# Patient Record
Sex: MALE | Race: WHITE | NOT HISPANIC OR LATINO | URBAN - METROPOLITAN AREA
[De-identification: names, ages, dates, MRNs, and addresses within clinical notes are randomized per-mention and may not be internally consistent; named-entity substitution may affect disease eponyms.]

---

## 2017-01-06 ENCOUNTER — COMMUNICATION - HEALTHEAST (OUTPATIENT)
Dept: FAMILY MEDICINE | Facility: CLINIC | Age: 67
End: 2017-01-06

## 2017-01-17 ENCOUNTER — COMMUNICATION - HEALTHEAST (OUTPATIENT)
Dept: FAMILY MEDICINE | Facility: CLINIC | Age: 67
End: 2017-01-17

## 2017-01-31 ENCOUNTER — COMMUNICATION - HEALTHEAST (OUTPATIENT)
Dept: FAMILY MEDICINE | Facility: CLINIC | Age: 67
End: 2017-01-31

## 2017-01-31 DIAGNOSIS — I10 UNSPECIFIED ESSENTIAL HYPERTENSION: ICD-10-CM

## 2017-01-31 DIAGNOSIS — I10 HTN (HYPERTENSION): ICD-10-CM

## 2017-01-31 DIAGNOSIS — E11.9 TYPE 2 DIABETES MELLITUS (H): ICD-10-CM

## 2017-02-07 ENCOUNTER — COMMUNICATION - HEALTHEAST (OUTPATIENT)
Dept: FAMILY MEDICINE | Facility: CLINIC | Age: 67
End: 2017-02-07

## 2017-02-07 DIAGNOSIS — I10 UNSPECIFIED ESSENTIAL HYPERTENSION: ICD-10-CM

## 2017-02-09 ENCOUNTER — COMMUNICATION - HEALTHEAST (OUTPATIENT)
Dept: FAMILY MEDICINE | Facility: CLINIC | Age: 67
End: 2017-02-09

## 2017-03-13 ENCOUNTER — COMMUNICATION - HEALTHEAST (OUTPATIENT)
Dept: FAMILY MEDICINE | Facility: CLINIC | Age: 67
End: 2017-03-13

## 2017-03-17 ENCOUNTER — RECORDS - HEALTHEAST (OUTPATIENT)
Dept: ADMINISTRATIVE | Facility: OTHER | Age: 67
End: 2017-03-17

## 2017-03-26 ENCOUNTER — COMMUNICATION - HEALTHEAST (OUTPATIENT)
Dept: FAMILY MEDICINE | Facility: CLINIC | Age: 67
End: 2017-03-26

## 2017-03-26 DIAGNOSIS — E11.9 DM (DIABETES MELLITUS) (H): ICD-10-CM

## 2017-03-27 ENCOUNTER — COMMUNICATION - HEALTHEAST (OUTPATIENT)
Dept: FAMILY MEDICINE | Facility: CLINIC | Age: 67
End: 2017-03-27

## 2017-03-27 DIAGNOSIS — E11.9 DIABETES (H): ICD-10-CM

## 2017-05-30 ENCOUNTER — RECORDS - HEALTHEAST (OUTPATIENT)
Dept: ADMINISTRATIVE | Facility: OTHER | Age: 67
End: 2017-05-30

## 2017-06-13 ENCOUNTER — AMBULATORY - HEALTHEAST (OUTPATIENT)
Dept: FAMILY MEDICINE | Facility: CLINIC | Age: 67
End: 2017-06-13

## 2017-06-13 ENCOUNTER — OFFICE VISIT - HEALTHEAST (OUTPATIENT)
Dept: FAMILY MEDICINE | Facility: CLINIC | Age: 67
End: 2017-06-13

## 2017-06-13 DIAGNOSIS — E55.9 VITAMIN D DEFICIENCY: ICD-10-CM

## 2017-06-13 DIAGNOSIS — M19.90 ARTHRITIS: ICD-10-CM

## 2017-06-13 DIAGNOSIS — I10 ESSENTIAL HYPERTENSION WITH GOAL BLOOD PRESSURE LESS THAN 140/90: ICD-10-CM

## 2017-06-13 DIAGNOSIS — N18.30 CHRONIC KIDNEY DISEASE, STAGE III (MODERATE) (H): ICD-10-CM

## 2017-06-13 DIAGNOSIS — E78.5 HYPERLIPIDEMIA, UNSPECIFIED HYPERLIPIDEMIA TYPE: ICD-10-CM

## 2017-06-13 DIAGNOSIS — M43.22 CERVICAL SPINE ANKYLOSIS: ICD-10-CM

## 2017-06-13 DIAGNOSIS — M06.9 RA (RHEUMATOID ARTHRITIS) (H): ICD-10-CM

## 2017-06-13 DIAGNOSIS — R68.89 TEMPERATURE INTOLERANCE: ICD-10-CM

## 2017-06-13 DIAGNOSIS — E11.9 TYPE 2 DIABETES MELLITUS (H): ICD-10-CM

## 2017-06-13 DIAGNOSIS — D64.9 ANEMIA, UNSPECIFIED: ICD-10-CM

## 2017-06-13 LAB — HBA1C MFR BLD: 6.8 % (ref 3.5–6)

## 2017-06-18 ENCOUNTER — COMMUNICATION - HEALTHEAST (OUTPATIENT)
Dept: FAMILY MEDICINE | Facility: CLINIC | Age: 67
End: 2017-06-18

## 2017-08-04 ENCOUNTER — COMMUNICATION - HEALTHEAST (OUTPATIENT)
Dept: FAMILY MEDICINE | Facility: CLINIC | Age: 67
End: 2017-08-04

## 2017-08-04 DIAGNOSIS — E11.9 TYPE 2 DIABETES MELLITUS (H): ICD-10-CM

## 2017-08-08 ENCOUNTER — RECORDS - HEALTHEAST (OUTPATIENT)
Dept: GENERAL RADIOLOGY | Age: 67
End: 2017-08-08

## 2017-08-08 ENCOUNTER — OFFICE VISIT - HEALTHEAST (OUTPATIENT)
Dept: RHEUMATOLOGY | Facility: CLINIC | Age: 67
End: 2017-08-08

## 2017-08-08 ENCOUNTER — COMMUNICATION - HEALTHEAST (OUTPATIENT)
Dept: TELEHEALTH | Facility: CLINIC | Age: 67
End: 2017-08-08

## 2017-08-08 DIAGNOSIS — N18.30 CHRONIC KIDNEY DISEASE, STAGE III (MODERATE) (H): ICD-10-CM

## 2017-08-08 DIAGNOSIS — M25.50 PAIN IN UNSPECIFIED JOINT: ICD-10-CM

## 2017-08-08 DIAGNOSIS — M54.50 LOW BACK PAIN: ICD-10-CM

## 2017-08-08 DIAGNOSIS — M25.50 POLYARTHRALGIA: ICD-10-CM

## 2017-08-08 DIAGNOSIS — M54.50 LOW BACK PAIN WITHOUT SCIATICA: ICD-10-CM

## 2017-08-08 DIAGNOSIS — M54.2 CERVICALGIA: ICD-10-CM

## 2017-08-08 LAB
ALT SERPL W P-5'-P-CCNC: 21 U/L (ref 0–45)
CREAT SERPL-MCNC: 2.13 MG/DL (ref 0.7–1.3)
GFR SERPL CREATININE-BSD FRML MDRD: 31 ML/MIN/1.73M2

## 2017-08-09 LAB
HBV SURFACE AG SERPL QL IA: NEGATIVE
HCV AB SERPL QL IA: NEGATIVE

## 2017-08-10 LAB — ANA SER QL: 0.4 U

## 2017-08-18 ENCOUNTER — RECORDS - HEALTHEAST (OUTPATIENT)
Dept: ADMINISTRATIVE | Facility: OTHER | Age: 67
End: 2017-08-18

## 2017-08-18 LAB
ALT SERPL W/O P-5'-P-CCNC: 24 U/L (ref 12–78)
AST SERPL-CCNC: 16 U/L (ref 15–37)
CREAT SERPL-MCNC: 1.98 MG/DL (ref 0.7–1.3)

## 2017-10-13 ENCOUNTER — COMMUNICATION - HEALTHEAST (OUTPATIENT)
Dept: FAMILY MEDICINE | Facility: CLINIC | Age: 67
End: 2017-10-13

## 2017-10-13 DIAGNOSIS — E78.5 HYPERLIPEMIA: ICD-10-CM

## 2017-10-13 DIAGNOSIS — I10 HTN (HYPERTENSION): ICD-10-CM

## 2017-10-30 ENCOUNTER — COMMUNICATION - HEALTHEAST (OUTPATIENT)
Dept: FAMILY MEDICINE | Facility: CLINIC | Age: 67
End: 2017-10-30

## 2017-11-01 ENCOUNTER — COMMUNICATION - HEALTHEAST (OUTPATIENT)
Dept: FAMILY MEDICINE | Facility: CLINIC | Age: 67
End: 2017-11-01

## 2017-11-01 DIAGNOSIS — I10 HTN (HYPERTENSION): ICD-10-CM

## 2017-11-25 ENCOUNTER — COMMUNICATION - HEALTHEAST (OUTPATIENT)
Dept: FAMILY MEDICINE | Facility: CLINIC | Age: 67
End: 2017-11-25

## 2017-11-28 ENCOUNTER — OFFICE VISIT - HEALTHEAST (OUTPATIENT)
Dept: FAMILY MEDICINE | Facility: CLINIC | Age: 67
End: 2017-11-28

## 2017-11-28 DIAGNOSIS — N18.30 CHRONIC KIDNEY DISEASE, STAGE III (MODERATE) (H): ICD-10-CM

## 2017-11-28 DIAGNOSIS — I10 ESSENTIAL HYPERTENSION: ICD-10-CM

## 2017-11-28 DIAGNOSIS — Z12.5 PROSTATE CANCER SCREENING: ICD-10-CM

## 2017-11-28 DIAGNOSIS — R79.89 LOW VITAMIN B12 LEVEL: ICD-10-CM

## 2017-11-28 DIAGNOSIS — D64.9 ANEMIA: ICD-10-CM

## 2017-11-28 DIAGNOSIS — R79.89 LOW VITAMIN D LEVEL: ICD-10-CM

## 2017-11-28 DIAGNOSIS — E11.9 TYPE 2 DIABETES MELLITUS (H): ICD-10-CM

## 2017-11-28 DIAGNOSIS — E78.5 HYPERLIPIDEMIA, UNSPECIFIED HYPERLIPIDEMIA TYPE: ICD-10-CM

## 2017-11-28 DIAGNOSIS — Z00.01 ENCOUNTER FOR GENERAL ADULT MEDICAL EXAMINATION WITH ABNORMAL FINDINGS: ICD-10-CM

## 2017-11-28 LAB
CHOLEST SERPL-MCNC: 122 MG/DL
FASTING STATUS PATIENT QL REPORTED: ABNORMAL
HBA1C MFR BLD: 6.7 % (ref 3.5–6)
HDLC SERPL-MCNC: 32 MG/DL
LDLC SERPL CALC-MCNC: 58 MG/DL
PSA SERPL-MCNC: 2.9 NG/ML (ref 0–4.5)
TRIGL SERPL-MCNC: 160 MG/DL

## 2017-11-28 ASSESSMENT — MIFFLIN-ST. JEOR: SCORE: 1941.94

## 2017-12-03 ENCOUNTER — COMMUNICATION - HEALTHEAST (OUTPATIENT)
Dept: FAMILY MEDICINE | Facility: CLINIC | Age: 67
End: 2017-12-03

## 2017-12-05 ENCOUNTER — COMMUNICATION - HEALTHEAST (OUTPATIENT)
Dept: FAMILY MEDICINE | Facility: CLINIC | Age: 67
End: 2017-12-05

## 2017-12-21 ENCOUNTER — COMMUNICATION - HEALTHEAST (OUTPATIENT)
Dept: FAMILY MEDICINE | Facility: CLINIC | Age: 67
End: 2017-12-21

## 2017-12-28 ENCOUNTER — AMBULATORY - HEALTHEAST (OUTPATIENT)
Dept: NURSING | Facility: CLINIC | Age: 67
End: 2017-12-28

## 2017-12-28 ENCOUNTER — AMBULATORY - HEALTHEAST (OUTPATIENT)
Dept: FAMILY MEDICINE | Facility: CLINIC | Age: 67
End: 2017-12-28

## 2018-01-16 ENCOUNTER — RECORDS - HEALTHEAST (OUTPATIENT)
Dept: ADMINISTRATIVE | Facility: OTHER | Age: 68
End: 2018-01-16

## 2018-01-27 ENCOUNTER — COMMUNICATION - HEALTHEAST (OUTPATIENT)
Dept: FAMILY MEDICINE | Facility: CLINIC | Age: 68
End: 2018-01-27

## 2018-01-27 DIAGNOSIS — I10 HTN (HYPERTENSION): ICD-10-CM

## 2018-03-26 ENCOUNTER — COMMUNICATION - HEALTHEAST (OUTPATIENT)
Dept: FAMILY MEDICINE | Facility: CLINIC | Age: 68
End: 2018-03-26

## 2018-03-26 DIAGNOSIS — E11.9 DIABETES (H): ICD-10-CM

## 2018-04-17 ENCOUNTER — OFFICE VISIT - HEALTHEAST (OUTPATIENT)
Dept: FAMILY MEDICINE | Facility: CLINIC | Age: 68
End: 2018-04-17

## 2018-04-17 DIAGNOSIS — R07.9 CHEST PAIN: ICD-10-CM

## 2018-04-17 DIAGNOSIS — N18.30 CHRONIC KIDNEY DISEASE, STAGE III (MODERATE) (H): ICD-10-CM

## 2018-04-17 DIAGNOSIS — R97.20 ELEVATED PSA: ICD-10-CM

## 2018-04-17 DIAGNOSIS — R68.89 TEMPERATURE INTOLERANCE: ICD-10-CM

## 2018-04-17 DIAGNOSIS — R41.3 MEMORY LOSS, SHORT TERM: ICD-10-CM

## 2018-04-17 DIAGNOSIS — G47.33 OSA (OBSTRUCTIVE SLEEP APNEA): ICD-10-CM

## 2018-04-17 DIAGNOSIS — E55.9 VITAMIN D DEFICIENCY: ICD-10-CM

## 2018-04-17 DIAGNOSIS — E11.9 TYPE 2 DIABETES MELLITUS (H): ICD-10-CM

## 2018-04-17 LAB
ALBUMIN SERPL-MCNC: 3.7 G/DL (ref 3.5–5)
ALP SERPL-CCNC: 108 U/L (ref 45–120)
ALT SERPL W P-5'-P-CCNC: 27 U/L (ref 0–45)
ANION GAP SERPL CALCULATED.3IONS-SCNC: 10 MMOL/L (ref 5–18)
AST SERPL W P-5'-P-CCNC: 20 U/L (ref 0–40)
ATRIAL RATE - MUSE: 72 BPM
BASOPHILS # BLD AUTO: 0 THOU/UL (ref 0–0.2)
BASOPHILS NFR BLD AUTO: 0 % (ref 0–2)
BILIRUB SERPL-MCNC: 0.5 MG/DL (ref 0–1)
BUN SERPL-MCNC: 39 MG/DL (ref 8–28)
CALCIUM SERPL-MCNC: 9.9 MG/DL (ref 8.5–10.5)
CHLORIDE BLD-SCNC: 107 MMOL/L (ref 98–107)
CHOLEST SERPL-MCNC: 140 MG/DL
CO2 SERPL-SCNC: 25 MMOL/L (ref 22–31)
CREAT SERPL-MCNC: 2.34 MG/DL (ref 0.7–1.3)
DIASTOLIC BLOOD PRESSURE - MUSE: NORMAL MMHG
EOSINOPHIL # BLD AUTO: 0.3 THOU/UL (ref 0–0.4)
EOSINOPHIL NFR BLD AUTO: 4 % (ref 0–6)
ERYTHROCYTE [DISTWIDTH] IN BLOOD BY AUTOMATED COUNT: 13.1 % (ref 11–14.5)
FASTING STATUS PATIENT QL REPORTED: ABNORMAL
GFR SERPL CREATININE-BSD FRML MDRD: 28 ML/MIN/1.73M2
GLUCOSE BLD-MCNC: 135 MG/DL (ref 70–125)
HBA1C MFR BLD: 7.3 % (ref 3.5–6)
HCT VFR BLD AUTO: 37.9 % (ref 40–54)
HDLC SERPL-MCNC: 30 MG/DL
HGB BLD-MCNC: 12.9 G/DL (ref 14–18)
INTERPRETATION ECG - MUSE: NORMAL
LDLC SERPL CALC-MCNC: 49 MG/DL
LYMPHOCYTES # BLD AUTO: 1.6 THOU/UL (ref 0.8–4.4)
LYMPHOCYTES NFR BLD AUTO: 23 % (ref 20–40)
MCH RBC QN AUTO: 31.1 PG (ref 27–34)
MCHC RBC AUTO-ENTMCNC: 34 G/DL (ref 32–36)
MCV RBC AUTO: 91 FL (ref 80–100)
MONOCYTES # BLD AUTO: 0.5 THOU/UL (ref 0–0.9)
MONOCYTES NFR BLD AUTO: 7 % (ref 2–10)
NEUTROPHILS # BLD AUTO: 4.6 THOU/UL (ref 2–7.7)
NEUTROPHILS NFR BLD AUTO: 66 % (ref 50–70)
P AXIS - MUSE: 68 DEGREES
PLATELET # BLD AUTO: 205 THOU/UL (ref 140–440)
PMV BLD AUTO: 9 FL (ref 7–10)
POTASSIUM BLD-SCNC: 5 MMOL/L (ref 3.5–5)
PR INTERVAL - MUSE: 286 MS
PROT SERPL-MCNC: 7 G/DL (ref 6–8)
PSA SERPL-MCNC: 2.9 NG/ML (ref 0–6.5)
QRS DURATION - MUSE: 82 MS
QT - MUSE: 374 MS
QTC - MUSE: 409 MS
R AXIS - MUSE: 29 DEGREES
RBC # BLD AUTO: 4.15 MILL/UL (ref 4.4–6.2)
SODIUM SERPL-SCNC: 142 MMOL/L (ref 136–145)
SYSTOLIC BLOOD PRESSURE - MUSE: NORMAL MMHG
T AXIS - MUSE: 25 DEGREES
TRIGL SERPL-MCNC: 307 MG/DL
TSH SERPL DL<=0.005 MIU/L-ACNC: 0.96 UIU/ML (ref 0.3–5)
VENTRICULAR RATE- MUSE: 72 BPM
VIT B12 SERPL-MCNC: 701 PG/ML (ref 213–816)
WBC: 7 THOU/UL (ref 4–11)

## 2018-04-17 RX ORDER — NITROGLYCERIN 0.4 MG/1
0.4 TABLET SUBLINGUAL EVERY 5 MIN PRN
Qty: 25 TABLET | Refills: 3 | Status: SHIPPED | OUTPATIENT
Start: 2018-04-17

## 2018-04-17 ASSESSMENT — MIFFLIN-ST. JEOR: SCORE: 1991.39

## 2018-04-18 LAB
25(OH)D3 SERPL-MCNC: 34.1 NG/ML (ref 30–80)
25(OH)D3 SERPL-MCNC: 34.1 NG/ML (ref 30–80)

## 2018-05-03 ENCOUNTER — HOSPITAL ENCOUNTER (OUTPATIENT)
Dept: NUCLEAR MEDICINE | Facility: CLINIC | Age: 68
Discharge: HOME | End: 2018-05-03
Attending: FAMILY MEDICINE

## 2018-05-03 ENCOUNTER — HOSPITAL ENCOUNTER (OUTPATIENT)
Dept: CARDIOLOGY | Facility: CLINIC | Age: 68
Discharge: HOME | End: 2018-05-03
Attending: FAMILY MEDICINE

## 2018-05-03 DIAGNOSIS — R07.9 CHEST PAIN: ICD-10-CM

## 2018-05-03 DIAGNOSIS — E11.9 TYPE 2 DIABETES MELLITUS (H): ICD-10-CM

## 2018-05-03 LAB
CV STRESS CURRENT BP HE: NORMAL
CV STRESS CURRENT HR HE: 108
CV STRESS CURRENT HR HE: 109
CV STRESS CURRENT HR HE: 114
CV STRESS CURRENT HR HE: 120
CV STRESS CURRENT HR HE: 125
CV STRESS CURRENT HR HE: 126
CV STRESS CURRENT HR HE: 126
CV STRESS CURRENT HR HE: 127
CV STRESS CURRENT HR HE: 127
CV STRESS CURRENT HR HE: 128
CV STRESS CURRENT HR HE: 128
CV STRESS CURRENT HR HE: 129
CV STRESS CURRENT HR HE: 130
CV STRESS CURRENT HR HE: 72
CV STRESS CURRENT HR HE: 73
CV STRESS CURRENT HR HE: 73
CV STRESS CURRENT HR HE: 74
CV STRESS CURRENT HR HE: 75
CV STRESS CURRENT HR HE: 76
CV STRESS CURRENT HR HE: 76
CV STRESS CURRENT HR HE: 77
CV STRESS CURRENT HR HE: 78
CV STRESS CURRENT HR HE: 80
CV STRESS CURRENT HR HE: 81
CV STRESS CURRENT HR HE: 82
CV STRESS CURRENT HR HE: 83
CV STRESS CURRENT HR HE: 86
CV STRESS CURRENT HR HE: 86
CV STRESS CURRENT HR HE: 87
CV STRESS CURRENT HR HE: 88
CV STRESS CURRENT HR HE: 89
CV STRESS CURRENT HR HE: 97
CV STRESS CURRENT HR HE: 99
CV STRESS DEVIATION TIME HE: NORMAL
CV STRESS ECHO PERCENT HR HE: NORMAL
CV STRESS EXERCISE STAGE HE: NORMAL
CV STRESS FINAL RESTING BP HE: NORMAL
CV STRESS FINAL RESTING HR HE: 75
CV STRESS MAX HR HE: 130
CV STRESS MAX TREADMILL GRADE HE: 0
CV STRESS MAX TREADMILL SPEED HE: 0
CV STRESS PEAK DIA BP HE: NORMAL
CV STRESS PEAK SYS BP HE: NORMAL
CV STRESS PHASE HE: NORMAL
CV STRESS PROTOCOL HE: NORMAL
CV STRESS RESTING PT POSITION HE: NORMAL
CV STRESS RESTING PT POSITION HE: NORMAL
CV STRESS ST DEVIATION AMOUNT HE: NORMAL
CV STRESS ST DEVIATION ELEVATION HE: NORMAL
CV STRESS ST EVELATION AMOUNT HE: NORMAL
CV STRESS TEST TYPE HE: NORMAL
CV STRESS TOTAL STAGE TIME MIN 1 HE: NORMAL
NUC STRESS EJECTION FRACTION: 56 %
STRESS ECHO BASELINE BP: NORMAL
STRESS ECHO BASELINE HR: 89
STRESS ECHO CALCULATED PERCENT HR: 87 %
STRESS ECHO LAST STRESS BP: NORMAL
STRESS ECHO LAST STRESS HR: 128
STRESS ECHO POST ESTIMATED WORKLOAD: 5.2
STRESS ECHO POST EXERCISE DUR MIN: 3
STRESS ECHO POST EXERCISE DUR SEC: 56
STRESS ECHO TARGET HR: 128

## 2018-05-04 ENCOUNTER — AMBULATORY - HEALTHEAST (OUTPATIENT)
Dept: FAMILY MEDICINE | Facility: CLINIC | Age: 68
End: 2018-05-04

## 2018-05-04 ENCOUNTER — COMMUNICATION - HEALTHEAST (OUTPATIENT)
Dept: FAMILY MEDICINE | Facility: CLINIC | Age: 68
End: 2018-05-04

## 2018-05-04 DIAGNOSIS — R94.39 ABNORMAL STRESS TEST: ICD-10-CM

## 2018-05-04 DIAGNOSIS — I25.10 CORONARY ATHEROSCLEROSIS: ICD-10-CM

## 2018-05-08 ENCOUNTER — OFFICE VISIT - HEALTHEAST (OUTPATIENT)
Dept: CARDIOLOGY | Facility: CLINIC | Age: 68
End: 2018-05-08

## 2018-05-08 DIAGNOSIS — G47.33 OBSTRUCTIVE SLEEP APNEA: ICD-10-CM

## 2018-05-08 DIAGNOSIS — I10 ESSENTIAL HYPERTENSION: ICD-10-CM

## 2018-05-08 DIAGNOSIS — E78.2 MIXED HYPERLIPIDEMIA: ICD-10-CM

## 2018-05-08 DIAGNOSIS — R94.39 ABNORMAL NUCLEAR STRESS TEST: ICD-10-CM

## 2018-05-08 DIAGNOSIS — I20.0 UNSTABLE ANGINA (H): ICD-10-CM

## 2018-05-08 ASSESSMENT — MIFFLIN-ST. JEOR
SCORE: 1956.01
SCORE: 1978.23

## 2018-05-09 ENCOUNTER — SURGERY - HEALTHEAST (OUTPATIENT)
Dept: CARDIOLOGY | Facility: CLINIC | Age: 68
End: 2018-05-09

## 2018-05-10 ASSESSMENT — MIFFLIN-ST. JEOR: SCORE: 1962.36

## 2018-05-12 ENCOUNTER — COMMUNICATION - HEALTHEAST (OUTPATIENT)
Dept: FAMILY MEDICINE | Facility: CLINIC | Age: 68
End: 2018-05-12

## 2018-05-12 DIAGNOSIS — E11.9 DIABETES (H): ICD-10-CM

## 2018-05-14 ENCOUNTER — SURGERY - HEALTHEAST (OUTPATIENT)
Dept: CARDIOLOGY | Facility: CLINIC | Age: 68
End: 2018-05-14

## 2018-05-14 DIAGNOSIS — I25.10 CORONARY ARTERY DISEASE: ICD-10-CM

## 2018-05-16 ENCOUNTER — ANESTHESIA - HEALTHEAST (OUTPATIENT)
Dept: SURGERY | Facility: CLINIC | Age: 68
End: 2018-05-16

## 2018-05-16 ENCOUNTER — HOSPITAL ENCOUNTER (OUTPATIENT)
Dept: SURGERY | Facility: CLINIC | Age: 68
Discharge: HOME | End: 2018-05-16

## 2018-05-16 ENCOUNTER — HOSPITAL ENCOUNTER (OUTPATIENT)
Dept: RADIOLOGY | Facility: CLINIC | Age: 68
Discharge: HOME | End: 2018-05-16

## 2018-05-16 DIAGNOSIS — I25.10 CORONARY ARTERY DISEASE: ICD-10-CM

## 2018-05-16 LAB
ABO/RH(D): NORMAL
ALBUMIN UR-MCNC: ABNORMAL MG/DL
ANION GAP SERPL CALCULATED.3IONS-SCNC: 9 MMOL/L (ref 5–18)
ANTIBODY SCREEN: NEGATIVE
APPEARANCE UR: CLEAR
BACTERIA #/AREA URNS HPF: ABNORMAL HPF
BILIRUB UR QL STRIP: NEGATIVE
BUN SERPL-MCNC: 44 MG/DL (ref 8–28)
CALCIUM SERPL-MCNC: 10.3 MG/DL (ref 8.5–10.5)
CHLORIDE BLD-SCNC: 108 MMOL/L (ref 98–107)
CO2 SERPL-SCNC: 23 MMOL/L (ref 22–31)
COLOR UR AUTO: YELLOW
CREAT SERPL-MCNC: 2.56 MG/DL (ref 0.7–1.3)
ERYTHROCYTE [DISTWIDTH] IN BLOOD BY AUTOMATED COUNT: 12.7 % (ref 11–14.5)
GFR SERPL CREATININE-BSD FRML MDRD: 25 ML/MIN/1.73M2
GLUCOSE BLD-MCNC: 252 MG/DL (ref 70–125)
GLUCOSE UR STRIP-MCNC: NEGATIVE MG/DL
HCT VFR BLD AUTO: 35.7 % (ref 40–54)
HGB BLD-MCNC: 12 G/DL (ref 14–18)
HGB UR QL STRIP: NEGATIVE
INR PPP: 1.05 (ref 0.9–1.1)
KETONES UR STRIP-MCNC: NEGATIVE MG/DL
LEUKOCYTE ESTERASE UR QL STRIP: NEGATIVE
MAGNESIUM SERPL-MCNC: 1.4 MG/DL (ref 1.8–2.6)
MCH RBC QN AUTO: 30.6 PG (ref 27–34)
MCHC RBC AUTO-ENTMCNC: 33.6 G/DL (ref 32–36)
MCV RBC AUTO: 91 FL (ref 80–100)
MUCOUS THREADS #/AREA URNS LPF: ABNORMAL LPF
NITRATE UR QL: NEGATIVE
PH UR STRIP: 5.5 [PH] (ref 4.5–8)
PLATELET # BLD AUTO: 206 THOU/UL (ref 140–440)
PMV BLD AUTO: 11 FL (ref 8.5–12.5)
POTASSIUM BLD-SCNC: 5.4 MMOL/L (ref 3.5–5)
PREALB SERPL-MCNC: 26 MG/DL (ref 19–38)
RBC # BLD AUTO: 3.92 MILL/UL (ref 4.4–6.2)
RBC #/AREA URNS AUTO: ABNORMAL HPF
SODIUM SERPL-SCNC: 140 MMOL/L (ref 136–145)
SP GR UR STRIP: 1.01 (ref 1–1.03)
SQUAMOUS #/AREA URNS AUTO: ABNORMAL LPF
UROBILINOGEN UR STRIP-ACNC: ABNORMAL
WBC #/AREA URNS AUTO: ABNORMAL HPF
WBC: 6.5 THOU/UL (ref 4–11)

## 2018-05-16 ASSESSMENT — MIFFLIN-ST. JEOR: SCORE: 1965.99

## 2018-05-17 ENCOUNTER — SURGERY - HEALTHEAST (OUTPATIENT)
Dept: SURGERY | Facility: CLINIC | Age: 68
End: 2018-05-17

## 2018-05-17 LAB — BACTERIA SPEC CULT: NORMAL

## 2018-05-17 ASSESSMENT — MIFFLIN-ST. JEOR: SCORE: 1947.39

## 2018-05-20 ASSESSMENT — MIFFLIN-ST. JEOR: SCORE: 1991.84

## 2018-05-21 ASSESSMENT — MIFFLIN-ST. JEOR: SCORE: 2001.37

## 2018-05-22 ASSESSMENT — MIFFLIN-ST. JEOR: SCORE: 1966.89

## 2018-05-23 ASSESSMENT — MIFFLIN-ST. JEOR: SCORE: 1931.51

## 2018-05-24 ASSESSMENT — MIFFLIN-ST. JEOR: SCORE: 1918.36

## 2018-05-29 ENCOUNTER — OFFICE VISIT - HEALTHEAST (OUTPATIENT)
Dept: FAMILY MEDICINE | Facility: CLINIC | Age: 68
End: 2018-05-29

## 2018-05-29 DIAGNOSIS — Z79.4 TYPE 2 DIABETES MELLITUS WITH STAGE 3 CHRONIC KIDNEY DISEASE, WITH LONG-TERM CURRENT USE OF INSULIN (H): ICD-10-CM

## 2018-05-29 DIAGNOSIS — Z95.1 S/P CABG (CORONARY ARTERY BYPASS GRAFT): ICD-10-CM

## 2018-05-29 DIAGNOSIS — D64.9 ANEMIA: ICD-10-CM

## 2018-05-29 DIAGNOSIS — E11.22 TYPE 2 DIABETES MELLITUS WITH STAGE 3 CHRONIC KIDNEY DISEASE, WITH LONG-TERM CURRENT USE OF INSULIN (H): ICD-10-CM

## 2018-05-29 DIAGNOSIS — N18.30 CHRONIC KIDNEY DISEASE, STAGE III (MODERATE) (H): ICD-10-CM

## 2018-05-29 DIAGNOSIS — I10 ESSENTIAL HYPERTENSION: ICD-10-CM

## 2018-05-29 DIAGNOSIS — R30.0 DYSURIA: ICD-10-CM

## 2018-05-29 DIAGNOSIS — N18.30 TYPE 2 DIABETES MELLITUS WITH STAGE 3 CHRONIC KIDNEY DISEASE, WITH LONG-TERM CURRENT USE OF INSULIN (H): ICD-10-CM

## 2018-05-29 LAB
ALBUMIN UR-MCNC: ABNORMAL MG/DL
ANION GAP SERPL CALCULATED.3IONS-SCNC: 17 MMOL/L (ref 5–18)
APPEARANCE UR: CLEAR
BACTERIA #/AREA URNS HPF: ABNORMAL HPF
BILIRUB UR QL STRIP: NEGATIVE
BUN SERPL-MCNC: 82 MG/DL (ref 8–28)
CALCIUM SERPL-MCNC: 10.2 MG/DL (ref 8.5–10.5)
CHLORIDE BLD-SCNC: 101 MMOL/L (ref 98–107)
CO2 SERPL-SCNC: 20 MMOL/L (ref 22–31)
COLOR UR AUTO: YELLOW
CREAT SERPL-MCNC: 4.08 MG/DL (ref 0.7–1.3)
ERYTHROCYTE [DISTWIDTH] IN BLOOD BY AUTOMATED COUNT: 12.7 % (ref 11–14.5)
GFR SERPL CREATININE-BSD FRML MDRD: 15 ML/MIN/1.73M2
GLUCOSE BLD-MCNC: 172 MG/DL (ref 70–125)
GLUCOSE UR STRIP-MCNC: NEGATIVE MG/DL
HCT VFR BLD AUTO: 29 % (ref 40–54)
HGB BLD-MCNC: 9.7 G/DL (ref 14–18)
HGB UR QL STRIP: NEGATIVE
KETONES UR STRIP-MCNC: NEGATIVE MG/DL
LEUKOCYTE ESTERASE UR QL STRIP: ABNORMAL
MCH RBC QN AUTO: 30.6 PG (ref 27–34)
MCHC RBC AUTO-ENTMCNC: 33.5 G/DL (ref 32–36)
MCV RBC AUTO: 91 FL (ref 80–100)
MUCOUS THREADS #/AREA URNS LPF: ABNORMAL LPF
NITRATE UR QL: NEGATIVE
PH UR STRIP: 5.5 [PH] (ref 5–8)
PLATELET # BLD AUTO: 429 THOU/UL (ref 140–440)
PMV BLD AUTO: 7.6 FL (ref 7–10)
POTASSIUM BLD-SCNC: 4.5 MMOL/L (ref 3.5–5)
RBC # BLD AUTO: 3.18 MILL/UL (ref 4.4–6.2)
RBC #/AREA URNS AUTO: ABNORMAL HPF
SODIUM SERPL-SCNC: 138 MMOL/L (ref 136–145)
SP GR UR STRIP: 1.01 (ref 1–1.03)
SQUAMOUS #/AREA URNS AUTO: ABNORMAL LPF
UROBILINOGEN UR STRIP-ACNC: ABNORMAL
WBC #/AREA URNS AUTO: ABNORMAL HPF
WBC: 9.1 THOU/UL (ref 4–11)

## 2018-05-31 ENCOUNTER — AMBULATORY - HEALTHEAST (OUTPATIENT)
Dept: LAB | Facility: CLINIC | Age: 68
End: 2018-05-31

## 2018-05-31 DIAGNOSIS — E11.21 TYPE 2 DIABETES MELLITUS WITH DIABETIC NEPHROPATHY (H): ICD-10-CM

## 2018-05-31 DIAGNOSIS — E87.5 HYPERKALEMIA: ICD-10-CM

## 2018-05-31 DIAGNOSIS — I10 ESSENTIAL (PRIMARY) HYPERTENSION: ICD-10-CM

## 2018-05-31 DIAGNOSIS — N17.9 ACUTE KIDNEY FAILURE (H): ICD-10-CM

## 2018-05-31 DIAGNOSIS — N18.30 CHRONIC KIDNEY DISEASE (CKD), STAGE III (MODERATE) (H): ICD-10-CM

## 2018-06-11 ENCOUNTER — OFFICE VISIT - HEALTHEAST (OUTPATIENT)
Dept: FAMILY MEDICINE | Facility: CLINIC | Age: 68
End: 2018-06-11

## 2018-06-11 DIAGNOSIS — E87.5 HYPERKALEMIA: ICD-10-CM

## 2018-06-11 DIAGNOSIS — E11.21 TYPE 2 DIABETES MELLITUS WITH DIABETIC NEPHROPATHY (H): ICD-10-CM

## 2018-06-11 DIAGNOSIS — E11.22 TYPE 2 DIABETES MELLITUS WITH STAGE 3 CHRONIC KIDNEY DISEASE, WITH LONG-TERM CURRENT USE OF INSULIN (H): ICD-10-CM

## 2018-06-11 DIAGNOSIS — Z79.4 TYPE 2 DIABETES MELLITUS WITH STAGE 3 CHRONIC KIDNEY DISEASE, WITH LONG-TERM CURRENT USE OF INSULIN (H): ICD-10-CM

## 2018-06-11 DIAGNOSIS — N17.9 ACUTE KIDNEY FAILURE (H): ICD-10-CM

## 2018-06-11 DIAGNOSIS — N17.9 AKI (ACUTE KIDNEY INJURY) (H): ICD-10-CM

## 2018-06-11 DIAGNOSIS — Z95.1 S/P CABG (CORONARY ARTERY BYPASS GRAFT): ICD-10-CM

## 2018-06-11 DIAGNOSIS — N18.30 CHRONIC KIDNEY DISEASE (CKD), STAGE III (MODERATE) (H): ICD-10-CM

## 2018-06-11 DIAGNOSIS — N18.30 CHRONIC KIDNEY DISEASE, STAGE III (MODERATE) (H): ICD-10-CM

## 2018-06-11 DIAGNOSIS — N18.30 TYPE 2 DIABETES MELLITUS WITH STAGE 3 CHRONIC KIDNEY DISEASE, WITH LONG-TERM CURRENT USE OF INSULIN (H): ICD-10-CM

## 2018-06-11 DIAGNOSIS — I95.9 HYPOTENSION: ICD-10-CM

## 2018-06-11 DIAGNOSIS — I10 ESSENTIAL (PRIMARY) HYPERTENSION: ICD-10-CM

## 2018-06-11 LAB
ANION GAP SERPL CALCULATED.3IONS-SCNC: 15 MMOL/L (ref 5–18)
BUN SERPL-MCNC: 58 MG/DL (ref 8–28)
CALCIUM SERPL-MCNC: 10.4 MG/DL (ref 8.5–10.5)
CHLORIDE BLD-SCNC: 105 MMOL/L (ref 98–107)
CO2 SERPL-SCNC: 22 MMOL/L (ref 22–31)
CREAT SERPL-MCNC: 3.39 MG/DL (ref 0.7–1.3)
ERYTHROCYTE [DISTWIDTH] IN BLOOD BY AUTOMATED COUNT: 14.2 % (ref 11–14.5)
GFR SERPL CREATININE-BSD FRML MDRD: 18 ML/MIN/1.73M2
GLUCOSE BLD-MCNC: 125 MG/DL (ref 70–125)
HCT VFR BLD AUTO: 26.4 % (ref 40–54)
HGB BLD-MCNC: 8.8 G/DL (ref 14–18)
MCH RBC QN AUTO: 30.4 PG (ref 27–34)
MCHC RBC AUTO-ENTMCNC: 33.3 G/DL (ref 32–36)
MCV RBC AUTO: 91 FL (ref 80–100)
PHOSPHATE SERPL-MCNC: 4.3 MG/DL (ref 2.5–4.5)
PLATELET # BLD AUTO: 275 THOU/UL (ref 140–440)
PMV BLD AUTO: 11 FL (ref 8.5–12.5)
POTASSIUM BLD-SCNC: 4.3 MMOL/L (ref 3.5–5)
PTH-INTACT SERPL-MCNC: 66 PG/ML (ref 10–86)
RBC # BLD AUTO: 2.89 MILL/UL (ref 4.4–6.2)
SODIUM SERPL-SCNC: 142 MMOL/L (ref 136–145)
WBC: 6.2 THOU/UL (ref 4–11)

## 2018-06-12 LAB
25(OH)D3 SERPL-MCNC: 44.1 NG/ML (ref 30–80)
25(OH)D3 SERPL-MCNC: 44.1 NG/ML (ref 30–80)

## 2018-06-15 ENCOUNTER — AMBULATORY - HEALTHEAST (OUTPATIENT)
Dept: CARDIAC REHAB | Facility: CLINIC | Age: 68
End: 2018-06-15

## 2018-06-15 DIAGNOSIS — Z95.1 S/P CABG (CORONARY ARTERY BYPASS GRAFT): ICD-10-CM

## 2018-06-15 LAB
GLUCOSE BLDC GLUCOMTR-MCNC: 121 MG/DL
GLUCOSE BLDC GLUCOMTR-MCNC: 176 MG/DL

## 2018-06-19 ENCOUNTER — AMBULATORY - HEALTHEAST (OUTPATIENT)
Dept: CARDIAC REHAB | Facility: CLINIC | Age: 68
End: 2018-06-19

## 2018-06-19 DIAGNOSIS — Z95.1 S/P CABG (CORONARY ARTERY BYPASS GRAFT): ICD-10-CM

## 2018-06-19 LAB
GLUCOSE BLDC GLUCOMTR-MCNC: 179 MG/DL
GLUCOSE BLDC GLUCOMTR-MCNC: 193 MG/DL

## 2018-06-20 ENCOUNTER — COMMUNICATION - HEALTHEAST (OUTPATIENT)
Dept: SCHEDULING | Facility: CLINIC | Age: 68
End: 2018-06-20

## 2018-06-20 ENCOUNTER — OFFICE VISIT - HEALTHEAST (OUTPATIENT)
Dept: SLEEP MEDICINE | Facility: CLINIC | Age: 68
End: 2018-06-20

## 2018-06-20 ENCOUNTER — OFFICE VISIT - HEALTHEAST (OUTPATIENT)
Dept: CARDIOLOGY | Facility: CLINIC | Age: 68
End: 2018-06-20

## 2018-06-20 DIAGNOSIS — G47.50 PARASOMNIA: ICD-10-CM

## 2018-06-20 DIAGNOSIS — G47.33 OSA (OBSTRUCTIVE SLEEP APNEA): ICD-10-CM

## 2018-06-20 DIAGNOSIS — Z95.1 S/P CABG (CORONARY ARTERY BYPASS GRAFT): ICD-10-CM

## 2018-06-20 ASSESSMENT — MIFFLIN-ST. JEOR
SCORE: 1889.77
SCORE: 1889.77

## 2018-06-21 ENCOUNTER — COMMUNICATION - HEALTHEAST (OUTPATIENT)
Dept: FAMILY MEDICINE | Facility: CLINIC | Age: 68
End: 2018-06-21

## 2018-06-21 ENCOUNTER — AMBULATORY - HEALTHEAST (OUTPATIENT)
Dept: CARDIAC REHAB | Facility: CLINIC | Age: 68
End: 2018-06-21

## 2018-06-21 DIAGNOSIS — E78.5 HYPERLIPIDEMIA, UNSPECIFIED HYPERLIPIDEMIA TYPE: ICD-10-CM

## 2018-06-21 DIAGNOSIS — Z95.1 S/P CABG (CORONARY ARTERY BYPASS GRAFT): ICD-10-CM

## 2018-06-22 ENCOUNTER — AMBULATORY - HEALTHEAST (OUTPATIENT)
Dept: FAMILY MEDICINE | Facility: CLINIC | Age: 68
End: 2018-06-22

## 2018-06-22 ENCOUNTER — OFFICE VISIT - HEALTHEAST (OUTPATIENT)
Dept: FAMILY MEDICINE | Facility: CLINIC | Age: 68
End: 2018-06-22

## 2018-06-22 DIAGNOSIS — E11.22 CKD STAGE 4 DUE TO TYPE 2 DIABETES MELLITUS (H): ICD-10-CM

## 2018-06-22 DIAGNOSIS — N18.2 TYPE 2 DIABETES MELLITUS WITH STAGE 2 CHRONIC KIDNEY DISEASE, WITH LONG-TERM CURRENT USE OF INSULIN (H): ICD-10-CM

## 2018-06-22 DIAGNOSIS — R01.1 HEART MURMUR, SYSTOLIC: ICD-10-CM

## 2018-06-22 DIAGNOSIS — D64.9 ANEMIA: ICD-10-CM

## 2018-06-22 DIAGNOSIS — K59.01 SLOW TRANSIT CONSTIPATION: ICD-10-CM

## 2018-06-22 DIAGNOSIS — Z79.4 TYPE 2 DIABETES MELLITUS WITH STAGE 2 CHRONIC KIDNEY DISEASE, WITH LONG-TERM CURRENT USE OF INSULIN (H): ICD-10-CM

## 2018-06-22 DIAGNOSIS — E11.22 TYPE 2 DIABETES MELLITUS WITH STAGE 2 CHRONIC KIDNEY DISEASE, WITH LONG-TERM CURRENT USE OF INSULIN (H): ICD-10-CM

## 2018-06-22 DIAGNOSIS — N18.4 CKD STAGE 4 DUE TO TYPE 2 DIABETES MELLITUS (H): ICD-10-CM

## 2018-06-22 DIAGNOSIS — I25.110 CORONARY ARTERY DISEASE INVOLVING NATIVE CORONARY ARTERY OF NATIVE HEART WITH UNSTABLE ANGINA PECTORIS (H): ICD-10-CM

## 2018-06-22 DIAGNOSIS — I10 ESSENTIAL HYPERTENSION: ICD-10-CM

## 2018-06-26 ENCOUNTER — AMBULATORY - HEALTHEAST (OUTPATIENT)
Dept: CARDIAC REHAB | Facility: CLINIC | Age: 68
End: 2018-06-26

## 2018-06-26 DIAGNOSIS — Z95.1 S/P CABG (CORONARY ARTERY BYPASS GRAFT): ICD-10-CM

## 2018-06-28 ENCOUNTER — AMBULATORY - HEALTHEAST (OUTPATIENT)
Dept: CARDIAC REHAB | Facility: CLINIC | Age: 68
End: 2018-06-28

## 2018-06-28 DIAGNOSIS — Z95.1 S/P CABG (CORONARY ARTERY BYPASS GRAFT): ICD-10-CM

## 2018-06-28 LAB
GLUCOSE BLDC GLUCOMTR-MCNC: 124 MG/DL
GLUCOSE BLDC GLUCOMTR-MCNC: 87 MG/DL
GLUCOSE BLDC GLUCOMTR-MCNC: 96 MG/DL

## 2018-07-01 ENCOUNTER — COMMUNICATION - HEALTHEAST (OUTPATIENT)
Dept: FAMILY MEDICINE | Facility: CLINIC | Age: 68
End: 2018-07-01

## 2018-07-01 DIAGNOSIS — E11.9 DM (DIABETES MELLITUS) (H): ICD-10-CM

## 2018-07-05 ENCOUNTER — COMMUNICATION - HEALTHEAST (OUTPATIENT)
Dept: FAMILY MEDICINE | Facility: CLINIC | Age: 68
End: 2018-07-05

## 2018-07-05 DIAGNOSIS — E11.9 DIABETES MELLITUS, TYPE 2 (H): ICD-10-CM

## 2018-07-10 ENCOUNTER — AMBULATORY - HEALTHEAST (OUTPATIENT)
Dept: CARDIAC REHAB | Facility: CLINIC | Age: 68
End: 2018-07-10

## 2018-07-10 DIAGNOSIS — Z95.1 S/P CABG (CORONARY ARTERY BYPASS GRAFT): ICD-10-CM

## 2018-07-12 ENCOUNTER — AMBULATORY - HEALTHEAST (OUTPATIENT)
Dept: CARDIAC REHAB | Facility: CLINIC | Age: 68
End: 2018-07-12

## 2018-07-12 DIAGNOSIS — Z95.1 S/P CABG (CORONARY ARTERY BYPASS GRAFT): ICD-10-CM

## 2018-07-17 ENCOUNTER — AMBULATORY - HEALTHEAST (OUTPATIENT)
Dept: CARDIAC REHAB | Facility: CLINIC | Age: 68
End: 2018-07-17

## 2018-07-17 DIAGNOSIS — Z95.1 S/P CABG (CORONARY ARTERY BYPASS GRAFT): ICD-10-CM

## 2018-07-19 ENCOUNTER — OFFICE VISIT - HEALTHEAST (OUTPATIENT)
Dept: CARDIOLOGY | Facility: CLINIC | Age: 68
End: 2018-07-19

## 2018-07-19 DIAGNOSIS — I35.0 MILD AORTIC VALVE STENOSIS: ICD-10-CM

## 2018-07-19 DIAGNOSIS — Z95.1 S/P CABG (CORONARY ARTERY BYPASS GRAFT): ICD-10-CM

## 2018-07-19 DIAGNOSIS — E78.2 MIXED HYPERLIPIDEMIA: ICD-10-CM

## 2018-07-19 DIAGNOSIS — I10 ESSENTIAL HYPERTENSION: ICD-10-CM

## 2018-07-19 DIAGNOSIS — N18.4 CKD STAGE 4 DUE TO TYPE 2 DIABETES MELLITUS (H): ICD-10-CM

## 2018-07-19 DIAGNOSIS — E11.22 CKD STAGE 4 DUE TO TYPE 2 DIABETES MELLITUS (H): ICD-10-CM

## 2018-07-19 ASSESSMENT — MIFFLIN-ST. JEOR: SCORE: 1912.45

## 2018-07-24 ENCOUNTER — COMMUNICATION - HEALTHEAST (OUTPATIENT)
Dept: FAMILY MEDICINE | Facility: CLINIC | Age: 68
End: 2018-07-24

## 2018-07-24 ENCOUNTER — COMMUNICATION - HEALTHEAST (OUTPATIENT)
Dept: SCHEDULING | Facility: CLINIC | Age: 68
End: 2018-07-24

## 2018-07-24 ENCOUNTER — AMBULATORY - HEALTHEAST (OUTPATIENT)
Dept: CARDIAC REHAB | Facility: CLINIC | Age: 68
End: 2018-07-24

## 2018-07-24 DIAGNOSIS — Z95.1 S/P CABG (CORONARY ARTERY BYPASS GRAFT): ICD-10-CM

## 2018-07-24 DIAGNOSIS — E78.5 HYPERLIPIDEMIA, UNSPECIFIED HYPERLIPIDEMIA TYPE: ICD-10-CM

## 2018-07-26 ENCOUNTER — AMBULATORY - HEALTHEAST (OUTPATIENT)
Dept: CARDIAC REHAB | Facility: CLINIC | Age: 68
End: 2018-07-26

## 2018-07-26 DIAGNOSIS — Z95.1 S/P CABG (CORONARY ARTERY BYPASS GRAFT): ICD-10-CM

## 2018-07-31 ENCOUNTER — AMBULATORY - HEALTHEAST (OUTPATIENT)
Dept: CARDIAC REHAB | Facility: CLINIC | Age: 68
End: 2018-07-31

## 2018-07-31 DIAGNOSIS — Z95.1 S/P CABG (CORONARY ARTERY BYPASS GRAFT): ICD-10-CM

## 2018-08-02 ENCOUNTER — AMBULATORY - HEALTHEAST (OUTPATIENT)
Dept: CARDIAC REHAB | Facility: CLINIC | Age: 68
End: 2018-08-02

## 2018-08-02 DIAGNOSIS — Z95.1 S/P CABG (CORONARY ARTERY BYPASS GRAFT): ICD-10-CM

## 2018-08-06 ENCOUNTER — COMMUNICATION - HEALTHEAST (OUTPATIENT)
Dept: FAMILY MEDICINE | Facility: CLINIC | Age: 68
End: 2018-08-06

## 2018-08-06 DIAGNOSIS — I10 HTN (HYPERTENSION): ICD-10-CM

## 2018-08-07 ENCOUNTER — AMBULATORY - HEALTHEAST (OUTPATIENT)
Dept: CARDIAC REHAB | Facility: CLINIC | Age: 68
End: 2018-08-07

## 2018-08-07 DIAGNOSIS — Z95.1 S/P CABG (CORONARY ARTERY BYPASS GRAFT): ICD-10-CM

## 2018-08-07 LAB — GLUCOSE BLDC GLUCOMTR-MCNC: 141 MG/DL

## 2018-08-09 ENCOUNTER — AMBULATORY - HEALTHEAST (OUTPATIENT)
Dept: CARDIAC REHAB | Facility: CLINIC | Age: 68
End: 2018-08-09

## 2018-08-09 DIAGNOSIS — Z95.1 S/P CABG (CORONARY ARTERY BYPASS GRAFT): ICD-10-CM

## 2018-08-14 ENCOUNTER — AMBULATORY - HEALTHEAST (OUTPATIENT)
Dept: CARDIAC REHAB | Facility: CLINIC | Age: 68
End: 2018-08-14

## 2018-08-14 DIAGNOSIS — Z95.1 S/P CABG (CORONARY ARTERY BYPASS GRAFT): ICD-10-CM

## 2018-08-21 ENCOUNTER — COMMUNICATION - HEALTHEAST (OUTPATIENT)
Dept: FAMILY MEDICINE | Facility: CLINIC | Age: 68
End: 2018-08-21

## 2018-08-21 ENCOUNTER — AMBULATORY - HEALTHEAST (OUTPATIENT)
Dept: CARDIAC REHAB | Facility: CLINIC | Age: 68
End: 2018-08-21

## 2018-08-21 DIAGNOSIS — Z95.1 S/P CABG (CORONARY ARTERY BYPASS GRAFT): ICD-10-CM

## 2018-08-23 ENCOUNTER — AMBULATORY - HEALTHEAST (OUTPATIENT)
Dept: CARDIAC REHAB | Facility: CLINIC | Age: 68
End: 2018-08-23

## 2018-08-23 DIAGNOSIS — Z95.1 S/P CABG (CORONARY ARTERY BYPASS GRAFT): ICD-10-CM

## 2018-08-27 ENCOUNTER — COMMUNICATION - HEALTHEAST (OUTPATIENT)
Dept: FAMILY MEDICINE | Facility: CLINIC | Age: 68
End: 2018-08-27

## 2018-08-27 DIAGNOSIS — E11.9 DIABETES (H): ICD-10-CM

## 2018-08-28 ENCOUNTER — AMBULATORY - HEALTHEAST (OUTPATIENT)
Dept: CARDIAC REHAB | Facility: CLINIC | Age: 68
End: 2018-08-28

## 2018-08-28 DIAGNOSIS — Z95.1 S/P CABG (CORONARY ARTERY BYPASS GRAFT): ICD-10-CM

## 2018-08-30 ENCOUNTER — AMBULATORY - HEALTHEAST (OUTPATIENT)
Dept: CARDIAC REHAB | Facility: CLINIC | Age: 68
End: 2018-08-30

## 2018-08-30 ENCOUNTER — AMBULATORY - HEALTHEAST (OUTPATIENT)
Dept: LAB | Facility: CLINIC | Age: 68
End: 2018-08-30

## 2018-08-30 DIAGNOSIS — N18.4 CHRONIC RENAL DISEASE, STAGE IV (H): ICD-10-CM

## 2018-08-30 DIAGNOSIS — E11.21 TYPE II DIABETES MELLITUS WITH NEPHROPATHY (H): ICD-10-CM

## 2018-08-30 DIAGNOSIS — N18.9 ANEMIA IN CHRONIC RENAL DISEASE: ICD-10-CM

## 2018-08-30 DIAGNOSIS — N17.9 KIDNEY FAILURE, ACUTE (H): ICD-10-CM

## 2018-08-30 DIAGNOSIS — D63.1 ANEMIA IN CHRONIC RENAL DISEASE: ICD-10-CM

## 2018-08-30 DIAGNOSIS — I10 HYPERTENSION: ICD-10-CM

## 2018-08-30 DIAGNOSIS — Z95.1 S/P CABG (CORONARY ARTERY BYPASS GRAFT): ICD-10-CM

## 2018-09-06 ENCOUNTER — AMBULATORY - HEALTHEAST (OUTPATIENT)
Dept: CARDIAC REHAB | Facility: CLINIC | Age: 68
End: 2018-09-06

## 2018-09-06 DIAGNOSIS — Z95.1 S/P CABG (CORONARY ARTERY BYPASS GRAFT): ICD-10-CM

## 2018-09-10 ENCOUNTER — AMBULATORY - HEALTHEAST (OUTPATIENT)
Dept: LAB | Facility: CLINIC | Age: 68
End: 2018-09-10

## 2018-09-10 DIAGNOSIS — D63.1 ANEMIA IN CHRONIC RENAL DISEASE: ICD-10-CM

## 2018-09-10 DIAGNOSIS — N18.9 ANEMIA IN CHRONIC RENAL DISEASE: ICD-10-CM

## 2018-09-10 DIAGNOSIS — N18.4 CHRONIC RENAL DISEASE, STAGE IV (H): ICD-10-CM

## 2018-09-10 DIAGNOSIS — E11.21 TYPE II DIABETES MELLITUS WITH NEPHROPATHY (H): ICD-10-CM

## 2018-09-10 DIAGNOSIS — I10 HYPERTENSION: ICD-10-CM

## 2018-09-10 DIAGNOSIS — N17.9 KIDNEY FAILURE, ACUTE (H): ICD-10-CM

## 2018-09-10 LAB
ALBUMIN SERPL-MCNC: 4 G/DL (ref 3.5–5)
ANION GAP SERPL CALCULATED.3IONS-SCNC: 9 MMOL/L (ref 5–18)
BUN SERPL-MCNC: 54 MG/DL (ref 8–28)
CALCIUM SERPL-MCNC: 10.5 MG/DL (ref 8.5–10.5)
CHLORIDE BLD-SCNC: 108 MMOL/L (ref 98–107)
CO2 SERPL-SCNC: 26 MMOL/L (ref 22–31)
CREAT SERPL-MCNC: 3.23 MG/DL (ref 0.7–1.3)
CREAT UR-MCNC: 63.2 MG/DL
FERRITIN SERPL-MCNC: 86 NG/ML (ref 27–300)
GFR SERPL CREATININE-BSD FRML MDRD: 19 ML/MIN/1.73M2
GLUCOSE BLD-MCNC: 66 MG/DL (ref 70–125)
HGB BLD-MCNC: 11.8 G/DL (ref 14–18)
IRON SATN MFR SERPL: 28 % (ref 20–50)
IRON SERPL-MCNC: 99 UG/DL (ref 42–175)
MAGNESIUM SERPL-MCNC: 2.1 MG/DL (ref 1.8–2.6)
PHOSPHATE SERPL-MCNC: 4.2 MG/DL (ref 2.5–4.5)
POTASSIUM BLD-SCNC: 5 MMOL/L (ref 3.5–5)
PROTEIN, RANDOM URINE - HISTORICAL: 13 MG/DL
PROTEIN/CREAT RATIO, RANDOM UR: 0.21
SODIUM SERPL-SCNC: 143 MMOL/L (ref 136–145)
TIBC SERPL-MCNC: 348 UG/DL (ref 313–563)
TRANSFERRIN SERPL-MCNC: 278 MG/DL (ref 212–360)

## 2018-09-11 ENCOUNTER — RECORDS - HEALTHEAST (OUTPATIENT)
Dept: ADMINISTRATIVE | Facility: OTHER | Age: 68
End: 2018-09-11

## 2018-09-11 ENCOUNTER — AMBULATORY - HEALTHEAST (OUTPATIENT)
Dept: CARDIAC REHAB | Facility: CLINIC | Age: 68
End: 2018-09-11

## 2018-09-11 DIAGNOSIS — Z95.1 S/P CABG (CORONARY ARTERY BYPASS GRAFT): ICD-10-CM

## 2018-09-13 ENCOUNTER — COMMUNICATION - HEALTHEAST (OUTPATIENT)
Dept: FAMILY MEDICINE | Facility: CLINIC | Age: 68
End: 2018-09-13

## 2018-09-13 ENCOUNTER — AMBULATORY - HEALTHEAST (OUTPATIENT)
Dept: CARDIAC REHAB | Facility: CLINIC | Age: 68
End: 2018-09-13

## 2018-09-13 DIAGNOSIS — Z95.1 S/P CABG (CORONARY ARTERY BYPASS GRAFT): ICD-10-CM

## 2018-09-18 ENCOUNTER — AMBULATORY - HEALTHEAST (OUTPATIENT)
Dept: CARDIAC REHAB | Facility: CLINIC | Age: 68
End: 2018-09-18

## 2018-09-18 DIAGNOSIS — Z95.1 S/P CABG (CORONARY ARTERY BYPASS GRAFT): ICD-10-CM

## 2018-09-20 ENCOUNTER — AMBULATORY - HEALTHEAST (OUTPATIENT)
Dept: CARDIAC REHAB | Facility: CLINIC | Age: 68
End: 2018-09-20

## 2018-09-20 DIAGNOSIS — Z95.1 S/P CABG (CORONARY ARTERY BYPASS GRAFT): ICD-10-CM

## 2018-09-25 ENCOUNTER — AMBULATORY - HEALTHEAST (OUTPATIENT)
Dept: CARDIAC REHAB | Facility: CLINIC | Age: 68
End: 2018-09-25

## 2018-09-25 DIAGNOSIS — Z95.1 S/P CABG (CORONARY ARTERY BYPASS GRAFT): ICD-10-CM

## 2018-09-27 ENCOUNTER — AMBULATORY - HEALTHEAST (OUTPATIENT)
Dept: CARDIAC REHAB | Facility: CLINIC | Age: 68
End: 2018-09-27

## 2018-09-27 DIAGNOSIS — Z95.1 S/P CABG (CORONARY ARTERY BYPASS GRAFT): ICD-10-CM

## 2018-10-01 ENCOUNTER — AMBULATORY - HEALTHEAST (OUTPATIENT)
Dept: CARDIOLOGY | Facility: CLINIC | Age: 68
End: 2018-10-01

## 2018-10-01 ENCOUNTER — COMMUNICATION - HEALTHEAST (OUTPATIENT)
Dept: CARDIOLOGY | Facility: CLINIC | Age: 68
End: 2018-10-01

## 2018-10-01 DIAGNOSIS — I10 BENIGN ESSENTIAL HYPERTENSION: ICD-10-CM

## 2018-10-24 ENCOUNTER — RECORDS - HEALTHEAST (OUTPATIENT)
Dept: ADMINISTRATIVE | Facility: OTHER | Age: 68
End: 2018-10-24

## 2018-10-24 ENCOUNTER — RECORDS - HEALTHEAST (OUTPATIENT)
Dept: SLEEP MEDICINE | Facility: CLINIC | Age: 68
End: 2018-10-24

## 2018-10-24 DIAGNOSIS — G47.50 PARASOMNIA, UNSPECIFIED: ICD-10-CM

## 2018-10-24 DIAGNOSIS — G47.33 OBSTRUCTIVE SLEEP APNEA (ADULT) (PEDIATRIC): ICD-10-CM

## 2018-10-31 ENCOUNTER — AMBULATORY - HEALTHEAST (OUTPATIENT)
Dept: SLEEP MEDICINE | Facility: CLINIC | Age: 68
End: 2018-10-31

## 2018-10-31 ENCOUNTER — COMMUNICATION - HEALTHEAST (OUTPATIENT)
Dept: SLEEP MEDICINE | Facility: CLINIC | Age: 68
End: 2018-10-31

## 2018-11-05 ENCOUNTER — AMBULATORY - HEALTHEAST (OUTPATIENT)
Dept: NURSING | Facility: CLINIC | Age: 68
End: 2018-11-05

## 2018-11-28 ENCOUNTER — OFFICE VISIT - HEALTHEAST (OUTPATIENT)
Dept: SLEEP MEDICINE | Facility: CLINIC | Age: 68
End: 2018-11-28

## 2018-11-28 DIAGNOSIS — G47.33 OSA (OBSTRUCTIVE SLEEP APNEA): ICD-10-CM

## 2018-11-28 DIAGNOSIS — G47.8 OTHER SLEEP DISORDERS: ICD-10-CM

## 2018-11-28 ASSESSMENT — MIFFLIN-ST. JEOR: SCORE: 1912.45

## 2019-01-21 ENCOUNTER — OFFICE VISIT - HEALTHEAST (OUTPATIENT)
Dept: FAMILY MEDICINE | Facility: CLINIC | Age: 69
End: 2019-01-21

## 2019-01-21 DIAGNOSIS — E11.42 DIABETIC POLYNEUROPATHY ASSOCIATED WITH TYPE 2 DIABETES MELLITUS (H): ICD-10-CM

## 2019-01-21 DIAGNOSIS — Z79.4 TYPE 2 DIABETES MELLITUS WITH STAGE 4 CHRONIC KIDNEY DISEASE, WITH LONG-TERM CURRENT USE OF INSULIN (H): ICD-10-CM

## 2019-01-21 DIAGNOSIS — E78.2 MIXED HYPERLIPIDEMIA: ICD-10-CM

## 2019-01-21 DIAGNOSIS — E11.22 TYPE 2 DIABETES MELLITUS WITH STAGE 4 CHRONIC KIDNEY DISEASE, WITH LONG-TERM CURRENT USE OF INSULIN (H): ICD-10-CM

## 2019-01-21 DIAGNOSIS — N18.4 TYPE 2 DIABETES MELLITUS WITH STAGE 4 CHRONIC KIDNEY DISEASE, WITH LONG-TERM CURRENT USE OF INSULIN (H): ICD-10-CM

## 2019-01-21 DIAGNOSIS — I10 ESSENTIAL HYPERTENSION: ICD-10-CM

## 2019-01-21 DIAGNOSIS — N18.4 ANEMIA DUE TO STAGE 4 CHRONIC KIDNEY DISEASE (H): ICD-10-CM

## 2019-01-21 DIAGNOSIS — D63.1 ANEMIA DUE TO STAGE 4 CHRONIC KIDNEY DISEASE (H): ICD-10-CM

## 2019-01-21 DIAGNOSIS — Z95.1 S/P CABG (CORONARY ARTERY BYPASS GRAFT): ICD-10-CM

## 2019-01-21 LAB
ERYTHROCYTE [DISTWIDTH] IN BLOOD BY AUTOMATED COUNT: 12.4 % (ref 11–14.5)
HBA1C MFR BLD: 7.1 % (ref 3.5–6)
HCT VFR BLD AUTO: 37.8 % (ref 40–54)
HGB BLD-MCNC: 12.6 G/DL (ref 14–18)
MCH RBC QN AUTO: 30.5 PG (ref 27–34)
MCHC RBC AUTO-ENTMCNC: 33.4 G/DL (ref 32–36)
MCV RBC AUTO: 91 FL (ref 80–100)
PLATELET # BLD AUTO: 232 THOU/UL (ref 140–440)
PMV BLD AUTO: 9.1 FL (ref 7–10)
RBC # BLD AUTO: 4.15 MILL/UL (ref 4.4–6.2)
WBC: 6.3 THOU/UL (ref 4–11)

## 2019-01-22 LAB
ALBUMIN SERPL-MCNC: 4.2 G/DL (ref 3.5–5)
ALP SERPL-CCNC: 67 U/L (ref 45–120)
ALT SERPL W P-5'-P-CCNC: 12 U/L (ref 0–45)
ANION GAP SERPL CALCULATED.3IONS-SCNC: 15 MMOL/L (ref 5–18)
AST SERPL W P-5'-P-CCNC: 15 U/L (ref 0–40)
BILIRUB SERPL-MCNC: 0.3 MG/DL (ref 0–1)
BUN SERPL-MCNC: 54 MG/DL (ref 8–28)
CALCIUM SERPL-MCNC: 10.2 MG/DL (ref 8.5–10.5)
CHLORIDE BLD-SCNC: 104 MMOL/L (ref 98–107)
CO2 SERPL-SCNC: 21 MMOL/L (ref 22–31)
CREAT SERPL-MCNC: 2.9 MG/DL (ref 0.7–1.3)
GFR SERPL CREATININE-BSD FRML MDRD: 22 ML/MIN/1.73M2
GLUCOSE BLD-MCNC: 124 MG/DL (ref 70–125)
POTASSIUM BLD-SCNC: 4.7 MMOL/L (ref 3.5–5)
PROT SERPL-MCNC: 7.2 G/DL (ref 6–8)
SODIUM SERPL-SCNC: 140 MMOL/L (ref 136–145)

## 2019-02-04 ENCOUNTER — AMBULATORY - HEALTHEAST (OUTPATIENT)
Dept: MULTI SPECIALTY CLINIC | Facility: CLINIC | Age: 69
End: 2019-02-04

## 2019-02-04 ENCOUNTER — RECORDS - HEALTHEAST (OUTPATIENT)
Dept: ADMINISTRATIVE | Facility: OTHER | Age: 69
End: 2019-02-04

## 2019-02-12 ENCOUNTER — AMBULATORY - HEALTHEAST (OUTPATIENT)
Dept: LAB | Facility: CLINIC | Age: 69
End: 2019-02-12

## 2019-02-12 DIAGNOSIS — I10 HYPERTENSION: ICD-10-CM

## 2019-02-12 DIAGNOSIS — N18.9 ANEMIA OF CHRONIC RENAL FAILURE: ICD-10-CM

## 2019-02-12 DIAGNOSIS — E87.5 HYPERKALEMIA: ICD-10-CM

## 2019-02-12 DIAGNOSIS — D63.1 ANEMIA OF CHRONIC RENAL FAILURE: ICD-10-CM

## 2019-02-12 DIAGNOSIS — N17.9 ACUTE KIDNEY FAILURE, UNSPECIFIED (H): ICD-10-CM

## 2019-02-12 DIAGNOSIS — N18.4 CHRONIC KIDNEY DISEASE (CKD), STAGE IV (SEVERE) (H): ICD-10-CM

## 2019-02-12 DIAGNOSIS — E11.21 TYPE II DIABETES MELLITUS WITH NEPHROPATHY (H): ICD-10-CM

## 2019-02-27 ENCOUNTER — COMMUNICATION - HEALTHEAST (OUTPATIENT)
Dept: FAMILY MEDICINE | Facility: CLINIC | Age: 69
End: 2019-02-27

## 2019-02-27 DIAGNOSIS — N18.4 TYPE 2 DIABETES MELLITUS WITH STAGE 4 CHRONIC KIDNEY DISEASE, WITH LONG-TERM CURRENT USE OF INSULIN (H): ICD-10-CM

## 2019-02-27 DIAGNOSIS — Z79.4 TYPE 2 DIABETES MELLITUS WITH STAGE 4 CHRONIC KIDNEY DISEASE, WITH LONG-TERM CURRENT USE OF INSULIN (H): ICD-10-CM

## 2019-02-27 DIAGNOSIS — E11.22 TYPE 2 DIABETES MELLITUS WITH STAGE 4 CHRONIC KIDNEY DISEASE, WITH LONG-TERM CURRENT USE OF INSULIN (H): ICD-10-CM

## 2019-02-27 DIAGNOSIS — E78.5 HYPERLIPIDEMIA, UNSPECIFIED HYPERLIPIDEMIA TYPE: ICD-10-CM

## 2019-03-13 ENCOUNTER — RECORDS - HEALTHEAST (OUTPATIENT)
Dept: ADMINISTRATIVE | Facility: OTHER | Age: 69
End: 2019-03-13

## 2019-04-17 ENCOUNTER — COMMUNICATION - HEALTHEAST (OUTPATIENT)
Dept: FAMILY MEDICINE | Facility: CLINIC | Age: 69
End: 2019-04-17

## 2019-04-22 ENCOUNTER — COMMUNICATION - HEALTHEAST (OUTPATIENT)
Dept: FAMILY MEDICINE | Facility: CLINIC | Age: 69
End: 2019-04-22

## 2019-04-22 DIAGNOSIS — E78.5 HYPERLIPIDEMIA, UNSPECIFIED HYPERLIPIDEMIA TYPE: ICD-10-CM

## 2019-05-02 ENCOUNTER — RECORDS - HEALTHEAST (OUTPATIENT)
Dept: ADMINISTRATIVE | Facility: OTHER | Age: 69
End: 2019-05-02

## 2019-06-28 ENCOUNTER — COMMUNICATION - HEALTHEAST (OUTPATIENT)
Dept: FAMILY MEDICINE | Facility: CLINIC | Age: 69
End: 2019-06-28

## 2019-06-28 DIAGNOSIS — M10.9 GOUTY ARTHROPATHY: ICD-10-CM

## 2019-07-09 ENCOUNTER — COMMUNICATION - HEALTHEAST (OUTPATIENT)
Dept: FAMILY MEDICINE | Facility: CLINIC | Age: 69
End: 2019-07-09

## 2019-08-16 ENCOUNTER — AMBULATORY - HEALTHEAST (OUTPATIENT)
Dept: LAB | Facility: CLINIC | Age: 69
End: 2019-08-16

## 2019-08-16 DIAGNOSIS — E11.21 DIABETIC GLOMERULOPATHY (H): ICD-10-CM

## 2019-08-16 DIAGNOSIS — E87.5 HYPERPOTASSEMIA: ICD-10-CM

## 2019-08-16 DIAGNOSIS — E25.0 11 BETA-HYDROXYLASE DEFICIENCY (H): ICD-10-CM

## 2019-08-16 DIAGNOSIS — N18.9 ANEMIA OF CHRONIC RENAL FAILURE: ICD-10-CM

## 2019-08-16 DIAGNOSIS — N18.4 CHRONIC KIDNEY DISEASE, STAGE IV (SEVERE) (H): ICD-10-CM

## 2019-08-16 DIAGNOSIS — D63.1 ANEMIA OF CHRONIC RENAL FAILURE: ICD-10-CM

## 2019-08-16 DIAGNOSIS — N17.9 ACUTE KIDNEY FAILURE, UNSPECIFIED (H): ICD-10-CM

## 2019-09-08 ENCOUNTER — COMMUNICATION - HEALTHEAST (OUTPATIENT)
Dept: FAMILY MEDICINE | Facility: CLINIC | Age: 69
End: 2019-09-08

## 2019-09-08 DIAGNOSIS — Z95.1 S/P CABG (CORONARY ARTERY BYPASS GRAFT): ICD-10-CM

## 2019-10-11 ENCOUNTER — COMMUNICATION - HEALTHEAST (OUTPATIENT)
Dept: FAMILY MEDICINE | Facility: CLINIC | Age: 69
End: 2019-10-11

## 2019-10-11 DIAGNOSIS — E11.9 DM (DIABETES MELLITUS) (H): ICD-10-CM

## 2019-10-14 ENCOUNTER — OFFICE VISIT - HEALTHEAST (OUTPATIENT)
Dept: FAMILY MEDICINE | Facility: CLINIC | Age: 69
End: 2019-10-14

## 2019-10-14 DIAGNOSIS — E11.9 TYPE 2 DIABETES MELLITUS (H): ICD-10-CM

## 2019-10-14 DIAGNOSIS — N18.4 CHRONIC KIDNEY DISEASE, STAGE IV (SEVERE) (H): ICD-10-CM

## 2019-10-14 DIAGNOSIS — Z95.1 S/P CABG (CORONARY ARTERY BYPASS GRAFT): ICD-10-CM

## 2019-10-14 DIAGNOSIS — D63.1 ANEMIA OF CHRONIC RENAL FAILURE: ICD-10-CM

## 2019-10-14 DIAGNOSIS — E11.9 DIABETES (H): ICD-10-CM

## 2019-10-14 DIAGNOSIS — E25.0 11 BETA-HYDROXYLASE DEFICIENCY (H): ICD-10-CM

## 2019-10-14 DIAGNOSIS — E11.21 DIABETIC GLOMERULOPATHY (H): ICD-10-CM

## 2019-10-14 DIAGNOSIS — N17.9 ACUTE KIDNEY FAILURE, UNSPECIFIED (H): ICD-10-CM

## 2019-10-14 DIAGNOSIS — N18.9 ANEMIA OF CHRONIC RENAL FAILURE: ICD-10-CM

## 2019-10-14 DIAGNOSIS — R41.3 MEMORY LOSS: ICD-10-CM

## 2019-10-14 DIAGNOSIS — E87.5 HYPERPOTASSEMIA: ICD-10-CM

## 2019-10-14 DIAGNOSIS — I25.110 CORONARY ARTERY DISEASE INVOLVING NATIVE CORONARY ARTERY OF NATIVE HEART WITH UNSTABLE ANGINA PECTORIS (H): ICD-10-CM

## 2019-10-14 LAB
ALBUMIN SERPL-MCNC: 4 G/DL (ref 3.5–5)
ALBUMIN SERPL-MCNC: 4 G/DL (ref 3.5–5)
ALP SERPL-CCNC: 66 U/L (ref 45–120)
ALT SERPL W P-5'-P-CCNC: 11 U/L (ref 0–45)
ANION GAP SERPL CALCULATED.3IONS-SCNC: 11 MMOL/L (ref 5–18)
AST SERPL W P-5'-P-CCNC: 12 U/L (ref 0–40)
BILIRUB DIRECT SERPL-MCNC: 0.1 MG/DL
BILIRUB SERPL-MCNC: 0.3 MG/DL (ref 0–1)
BUN SERPL-MCNC: 68 MG/DL (ref 8–28)
CALCIUM SERPL-MCNC: 10.2 MG/DL (ref 8.5–10.5)
CHLORIDE BLD-SCNC: 105 MMOL/L (ref 98–107)
CO2 SERPL-SCNC: 24 MMOL/L (ref 22–31)
CREAT SERPL-MCNC: 3.28 MG/DL (ref 0.7–1.3)
CREAT UR-MCNC: 93.4 MG/DL
GFR SERPL CREATININE-BSD FRML MDRD: 19 ML/MIN/1.73M2
GLUCOSE BLD-MCNC: 162 MG/DL (ref 70–125)
HBA1C MFR BLD: 6.1 % (ref 3.5–6)
HGB BLD-MCNC: 12 G/DL (ref 14–18)
MAGNESIUM SERPL-MCNC: 1.8 MG/DL (ref 1.8–2.6)
PHOSPHATE SERPL-MCNC: 3.2 MG/DL (ref 2.5–4.5)
POTASSIUM BLD-SCNC: 4.9 MMOL/L (ref 3.5–5)
PROT SERPL-MCNC: 6.9 G/DL (ref 6–8)
PROTEIN, RANDOM URINE - HISTORICAL: 16 MG/DL
PROTEIN/CREAT RATIO, RANDOM UR: 0.17
PTH-INTACT SERPL-MCNC: 148 PG/ML (ref 10–86)
SODIUM SERPL-SCNC: 140 MMOL/L (ref 136–145)

## 2019-10-28 ENCOUNTER — RECORDS - HEALTHEAST (OUTPATIENT)
Dept: ADMINISTRATIVE | Facility: OTHER | Age: 69
End: 2019-10-28

## 2019-10-31 ENCOUNTER — COMMUNICATION - HEALTHEAST (OUTPATIENT)
Dept: FAMILY MEDICINE | Facility: CLINIC | Age: 69
End: 2019-10-31

## 2019-10-31 DIAGNOSIS — Z79.4 TYPE 2 DIABETES MELLITUS WITH STAGE 4 CHRONIC KIDNEY DISEASE, WITH LONG-TERM CURRENT USE OF INSULIN (H): ICD-10-CM

## 2019-10-31 DIAGNOSIS — M10.9 GOUTY ARTHROPATHY: ICD-10-CM

## 2019-10-31 DIAGNOSIS — Z95.1 S/P CABG (CORONARY ARTERY BYPASS GRAFT): ICD-10-CM

## 2019-10-31 DIAGNOSIS — N18.4 TYPE 2 DIABETES MELLITUS WITH STAGE 4 CHRONIC KIDNEY DISEASE, WITH LONG-TERM CURRENT USE OF INSULIN (H): ICD-10-CM

## 2019-10-31 DIAGNOSIS — I10 BENIGN ESSENTIAL HYPERTENSION: ICD-10-CM

## 2019-10-31 DIAGNOSIS — E78.5 HYPERLIPIDEMIA, UNSPECIFIED HYPERLIPIDEMIA TYPE: ICD-10-CM

## 2019-10-31 DIAGNOSIS — E11.22 TYPE 2 DIABETES MELLITUS WITH STAGE 4 CHRONIC KIDNEY DISEASE, WITH LONG-TERM CURRENT USE OF INSULIN (H): ICD-10-CM

## 2019-10-31 RX ORDER — GLIPIZIDE 5 MG/1
2.5 TABLET ORAL
Qty: 90 TABLET | Refills: 3 | Status: SHIPPED | OUTPATIENT
Start: 2019-10-31 | End: 2021-01-01

## 2019-10-31 RX ORDER — ISOSORBIDE MONONITRATE 30 MG/1
30 TABLET, EXTENDED RELEASE ORAL DAILY
Qty: 90 TABLET | Refills: 3 | Status: SHIPPED | OUTPATIENT
Start: 2019-10-31 | End: 2021-01-01

## 2019-11-11 ENCOUNTER — COMMUNICATION - HEALTHEAST (OUTPATIENT)
Dept: FAMILY MEDICINE | Facility: CLINIC | Age: 69
End: 2019-11-11

## 2019-11-21 ENCOUNTER — COMMUNICATION - HEALTHEAST (OUTPATIENT)
Dept: FAMILY MEDICINE | Facility: CLINIC | Age: 69
End: 2019-11-21

## 2019-11-21 DIAGNOSIS — E11.22 TYPE 2 DIABETES MELLITUS WITH STAGE 4 CHRONIC KIDNEY DISEASE, WITH LONG-TERM CURRENT USE OF INSULIN (H): ICD-10-CM

## 2019-11-21 DIAGNOSIS — Z79.4 TYPE 2 DIABETES MELLITUS WITH STAGE 4 CHRONIC KIDNEY DISEASE, WITH LONG-TERM CURRENT USE OF INSULIN (H): ICD-10-CM

## 2019-11-21 DIAGNOSIS — N18.4 TYPE 2 DIABETES MELLITUS WITH STAGE 4 CHRONIC KIDNEY DISEASE, WITH LONG-TERM CURRENT USE OF INSULIN (H): ICD-10-CM

## 2019-11-25 ENCOUNTER — OFFICE VISIT - HEALTHEAST (OUTPATIENT)
Dept: FAMILY MEDICINE | Facility: CLINIC | Age: 69
End: 2019-11-25

## 2019-11-25 DIAGNOSIS — B34.9 ACUTE VIRAL SYNDROME: ICD-10-CM

## 2019-11-25 DIAGNOSIS — N18.4 TYPE 2 DIABETES MELLITUS WITH STAGE 4 CHRONIC KIDNEY DISEASE, WITH LONG-TERM CURRENT USE OF INSULIN (H): ICD-10-CM

## 2019-11-25 DIAGNOSIS — Z79.4 TYPE 2 DIABETES MELLITUS WITH STAGE 4 CHRONIC KIDNEY DISEASE, WITH LONG-TERM CURRENT USE OF INSULIN (H): ICD-10-CM

## 2019-11-25 DIAGNOSIS — J20.8 ACUTE VIRAL BRONCHITIS: ICD-10-CM

## 2019-11-25 DIAGNOSIS — E11.22 TYPE 2 DIABETES MELLITUS WITH STAGE 4 CHRONIC KIDNEY DISEASE, WITH LONG-TERM CURRENT USE OF INSULIN (H): ICD-10-CM

## 2019-11-25 LAB
ANION GAP SERPL CALCULATED.3IONS-SCNC: 10 MMOL/L (ref 5–18)
BUN SERPL-MCNC: 50 MG/DL (ref 8–28)
CHLORIDE BLD-SCNC: 104 MMOL/L (ref 98–107)
CO2 SERPL-SCNC: 24 MMOL/L (ref 22–31)
CREAT SERPL-MCNC: 3.5 MG/DL (ref 0.8–1.5)
GLUCOSE BLD-MCNC: 104 MG/DL (ref 70–125)
POTASSIUM BLD-SCNC: 5.2 MMOL/L (ref 3.5–5.5)
SODIUM SERPL-SCNC: 138 MMOL/L (ref 136–145)

## 2019-11-25 RX ORDER — ALBUTEROL SULFATE 0.83 MG/ML
2.5 SOLUTION RESPIRATORY (INHALATION) EVERY 4 HOURS PRN
Qty: 30 VIAL | Refills: 0 | Status: SHIPPED | OUTPATIENT
Start: 2019-11-25

## 2019-11-29 ENCOUNTER — OFFICE VISIT - HEALTHEAST (OUTPATIENT)
Dept: FAMILY MEDICINE | Facility: CLINIC | Age: 69
End: 2019-11-29

## 2019-11-29 DIAGNOSIS — J20.9 ACUTE BRONCHITIS, UNSPECIFIED ORGANISM: ICD-10-CM

## 2019-11-29 RX ORDER — BENZONATATE 200 MG/1
200 CAPSULE ORAL 3 TIMES DAILY PRN
Qty: 30 CAPSULE | Refills: 0 | Status: SHIPPED | OUTPATIENT
Start: 2019-11-29 | End: 2021-01-01

## 2020-03-25 ENCOUNTER — AMBULATORY - HEALTHEAST (OUTPATIENT)
Dept: LAB | Facility: CLINIC | Age: 70
End: 2020-03-25

## 2020-03-25 DIAGNOSIS — N18.4 CHRONIC KIDNEY DISEASE, STAGE IV (SEVERE) (H): ICD-10-CM

## 2020-03-31 ENCOUNTER — AMBULATORY - HEALTHEAST (OUTPATIENT)
Dept: LAB | Facility: CLINIC | Age: 70
End: 2020-03-31

## 2020-03-31 DIAGNOSIS — E11.21 TYPE II DIABETES MELLITUS WITH NEPHROPATHY (H): ICD-10-CM

## 2020-03-31 DIAGNOSIS — N18.4 CHRONIC KIDNEY DISEASE, STAGE IV (SEVERE) (H): ICD-10-CM

## 2020-03-31 LAB
ALBUMIN SERPL-MCNC: 4 G/DL (ref 3.5–5)
ANION GAP SERPL CALCULATED.3IONS-SCNC: 12 MMOL/L (ref 5–18)
BUN SERPL-MCNC: 65 MG/DL (ref 8–28)
CALCIUM SERPL-MCNC: 9.9 MG/DL (ref 8.5–10.5)
CHLORIDE BLD-SCNC: 104 MMOL/L (ref 98–107)
CO2 SERPL-SCNC: 26 MMOL/L (ref 22–31)
CREAT SERPL-MCNC: 3.57 MG/DL (ref 0.7–1.3)
CREAT UR-MCNC: 77.2 MG/DL
GFR SERPL CREATININE-BSD FRML MDRD: 17 ML/MIN/1.73M2
GLUCOSE BLD-MCNC: 128 MG/DL (ref 70–125)
HBA1C MFR BLD: 6 % (ref 3.5–6)
HGB BLD-MCNC: 11.3 G/DL (ref 14–18)
MAGNESIUM SERPL-MCNC: 2 MG/DL (ref 1.8–2.6)
PHOSPHATE SERPL-MCNC: 4.1 MG/DL (ref 2.5–4.5)
POTASSIUM BLD-SCNC: 4.9 MMOL/L (ref 3.5–5)
PROTEIN, RANDOM URINE - HISTORICAL: 17 MG/DL
PROTEIN/CREAT RATIO, RANDOM UR: 0.22
SODIUM SERPL-SCNC: 142 MMOL/L (ref 136–145)

## 2020-04-02 ENCOUNTER — COMMUNICATION - HEALTHEAST (OUTPATIENT)
Dept: FAMILY MEDICINE | Facility: CLINIC | Age: 70
End: 2020-04-02

## 2020-04-02 ENCOUNTER — OFFICE VISIT - HEALTHEAST (OUTPATIENT)
Dept: FAMILY MEDICINE | Facility: CLINIC | Age: 70
End: 2020-04-02

## 2020-04-02 DIAGNOSIS — D63.1 ANEMIA OF CHRONIC RENAL FAILURE, STAGE 4 (SEVERE) (H): ICD-10-CM

## 2020-04-02 DIAGNOSIS — Z95.1 S/P CABG (CORONARY ARTERY BYPASS GRAFT): ICD-10-CM

## 2020-04-02 DIAGNOSIS — N18.4 ANEMIA OF CHRONIC RENAL FAILURE, STAGE 4 (SEVERE) (H): ICD-10-CM

## 2020-04-02 DIAGNOSIS — R41.3 MEMORY LOSS: ICD-10-CM

## 2020-04-02 DIAGNOSIS — Z79.4 TYPE 2 DIABETES MELLITUS WITH STAGE 4 CHRONIC KIDNEY DISEASE, WITH LONG-TERM CURRENT USE OF INSULIN (H): ICD-10-CM

## 2020-04-02 DIAGNOSIS — N18.4 CHRONIC KIDNEY DISEASE, STAGE IV (SEVERE) (H): ICD-10-CM

## 2020-04-02 DIAGNOSIS — N18.4 TYPE 2 DIABETES MELLITUS WITH STAGE 4 CHRONIC KIDNEY DISEASE, WITH LONG-TERM CURRENT USE OF INSULIN (H): ICD-10-CM

## 2020-04-02 DIAGNOSIS — E11.22 TYPE 2 DIABETES MELLITUS WITH STAGE 4 CHRONIC KIDNEY DISEASE, WITH LONG-TERM CURRENT USE OF INSULIN (H): ICD-10-CM

## 2020-04-20 ENCOUNTER — RECORDS - HEALTHEAST (OUTPATIENT)
Dept: ADMINISTRATIVE | Facility: OTHER | Age: 70
End: 2020-04-20

## 2020-05-20 ENCOUNTER — COMMUNICATION - HEALTHEAST (OUTPATIENT)
Dept: FAMILY MEDICINE | Facility: CLINIC | Age: 70
End: 2020-05-20

## 2020-05-20 DIAGNOSIS — Z95.1 S/P CABG (CORONARY ARTERY BYPASS GRAFT): ICD-10-CM

## 2020-07-27 ENCOUNTER — COMMUNICATION - HEALTHEAST (OUTPATIENT)
Dept: FAMILY MEDICINE | Facility: CLINIC | Age: 70
End: 2020-07-27

## 2020-07-28 ENCOUNTER — COMMUNICATION - HEALTHEAST (OUTPATIENT)
Dept: SCHEDULING | Facility: CLINIC | Age: 70
End: 2020-07-28

## 2020-07-28 ENCOUNTER — RECORDS - HEALTHEAST (OUTPATIENT)
Dept: ADMINISTRATIVE | Facility: OTHER | Age: 70
End: 2020-07-28

## 2020-07-28 ENCOUNTER — NURSE TRIAGE (OUTPATIENT)
Dept: NURSING | Facility: CLINIC | Age: 70
End: 2020-07-28

## 2020-07-28 LAB
ALT SERPL W/O P-5'-P-CCNC: 17.2 U/L (ref 14–63)
AST SERPL-CCNC: 14.3 U/L (ref 15–37)

## 2020-07-29 ENCOUNTER — RECORDS - HEALTHEAST (OUTPATIENT)
Dept: ADMINISTRATIVE | Facility: OTHER | Age: 70
End: 2020-07-29

## 2020-07-29 LAB — CREAT SERPL-MCNC: 3.36 MG/DL (ref 0.7–1.3)

## 2020-07-31 ENCOUNTER — COMMUNICATION - HEALTHEAST (OUTPATIENT)
Dept: FAMILY MEDICINE | Facility: CLINIC | Age: 70
End: 2020-07-31

## 2020-07-31 ENCOUNTER — OFFICE VISIT - HEALTHEAST (OUTPATIENT)
Dept: FAMILY MEDICINE | Facility: CLINIC | Age: 70
End: 2020-07-31

## 2020-07-31 ENCOUNTER — COMMUNICATION - HEALTHEAST (OUTPATIENT)
Dept: SCHEDULING | Facility: CLINIC | Age: 70
End: 2020-07-31

## 2020-07-31 DIAGNOSIS — E11.9 TYPE 2 DIABETES MELLITUS (H): ICD-10-CM

## 2020-07-31 DIAGNOSIS — N18.9 CHRONIC KIDNEY DISEASE, UNSPECIFIED CKD STAGE: ICD-10-CM

## 2020-07-31 DIAGNOSIS — Z79.4 TYPE 2 DIABETES MELLITUS WITH STAGE 4 CHRONIC KIDNEY DISEASE, WITH LONG-TERM CURRENT USE OF INSULIN (H): ICD-10-CM

## 2020-07-31 DIAGNOSIS — N18.4 TYPE 2 DIABETES MELLITUS WITH STAGE 4 CHRONIC KIDNEY DISEASE, WITH LONG-TERM CURRENT USE OF INSULIN (H): ICD-10-CM

## 2020-07-31 DIAGNOSIS — I25.10 CORONARY ARTERY DISEASE INVOLVING NATIVE HEART WITHOUT ANGINA PECTORIS, UNSPECIFIED VESSEL OR LESION TYPE: ICD-10-CM

## 2020-07-31 DIAGNOSIS — E11.22 TYPE 2 DIABETES MELLITUS WITH STAGE 4 CHRONIC KIDNEY DISEASE, WITH LONG-TERM CURRENT USE OF INSULIN (H): ICD-10-CM

## 2020-07-31 LAB
ANION GAP SERPL CALCULATED.3IONS-SCNC: 10 MMOL/L (ref 5–18)
BUN SERPL-MCNC: 67 MG/DL (ref 8–28)
CALCIUM SERPL-MCNC: 10 MG/DL (ref 8.5–10.5)
CHLORIDE BLD-SCNC: 107 MMOL/L (ref 98–107)
CO2 SERPL-SCNC: 23 MMOL/L (ref 22–31)
CREAT SERPL-MCNC: 3.13 MG/DL (ref 0.7–1.3)
GFR SERPL CREATININE-BSD FRML MDRD: 20 ML/MIN/1.73M2
GLUCOSE BLD-MCNC: 118 MG/DL (ref 70–125)
POTASSIUM BLD-SCNC: 5.1 MMOL/L (ref 3.5–5)
SODIUM SERPL-SCNC: 140 MMOL/L (ref 136–145)

## 2020-07-31 RX ORDER — BLOOD SUGAR DIAGNOSTIC
STRIP MISCELLANEOUS
Qty: 300 STRIP | Refills: 3 | Status: SHIPPED | OUTPATIENT
Start: 2020-07-31

## 2020-07-31 RX ORDER — GLUCOSAMINE HCL/CHONDROITIN SU 500-400 MG
1 CAPSULE ORAL 4 TIMES DAILY
Qty: 400 STRIP | Refills: 3 | Status: SHIPPED | OUTPATIENT
Start: 2020-07-31

## 2020-08-13 ENCOUNTER — RECORDS - HEALTHEAST (OUTPATIENT)
Dept: HEALTH INFORMATION MANAGEMENT | Facility: CLINIC | Age: 70
End: 2020-08-13

## 2020-09-14 ENCOUNTER — COMMUNICATION - HEALTHEAST (OUTPATIENT)
Dept: CARDIOLOGY | Facility: CLINIC | Age: 70
End: 2020-09-14

## 2020-09-15 ENCOUNTER — OFFICE VISIT - HEALTHEAST (OUTPATIENT)
Dept: CARDIOLOGY | Facility: CLINIC | Age: 70
End: 2020-09-15

## 2020-09-15 DIAGNOSIS — E78.2 MIXED HYPERLIPIDEMIA: ICD-10-CM

## 2020-09-15 DIAGNOSIS — I35.0 AORTIC VALVE STENOSIS: ICD-10-CM

## 2020-09-15 DIAGNOSIS — I10 BENIGN ESSENTIAL HYPERTENSION: ICD-10-CM

## 2020-09-15 DIAGNOSIS — Z95.1 S/P CABG (CORONARY ARTERY BYPASS GRAFT): ICD-10-CM

## 2020-09-15 LAB
CHOLEST SERPL-MCNC: 133 MG/DL
FASTING STATUS PATIENT QL REPORTED: YES
HDLC SERPL-MCNC: 37 MG/DL
LDLC SERPL CALC-MCNC: 76 MG/DL
TRIGL SERPL-MCNC: 99 MG/DL

## 2020-09-15 ASSESSMENT — MIFFLIN-ST. JEOR: SCORE: 1789.99

## 2020-09-24 ENCOUNTER — AMBULATORY - HEALTHEAST (OUTPATIENT)
Dept: LAB | Facility: CLINIC | Age: 70
End: 2020-09-24

## 2020-09-24 DIAGNOSIS — N18.4 CHRONIC RENAL DISEASE, STAGE IV (H): ICD-10-CM

## 2020-10-07 ENCOUNTER — COMMUNICATION - HEALTHEAST (OUTPATIENT)
Dept: SCHEDULING | Facility: CLINIC | Age: 70
End: 2020-10-07

## 2020-10-09 RX ORDER — AMLODIPINE BESYLATE 5 MG/1
10 TABLET ORAL
Status: SHIPPED | COMMUNITY
Start: 2020-10-02 | End: 2021-01-01

## 2020-10-12 ENCOUNTER — COMMUNICATION - HEALTHEAST (OUTPATIENT)
Dept: FAMILY MEDICINE | Facility: CLINIC | Age: 70
End: 2020-10-12

## 2020-10-12 ENCOUNTER — OFFICE VISIT - HEALTHEAST (OUTPATIENT)
Dept: FAMILY MEDICINE | Facility: CLINIC | Age: 70
End: 2020-10-12

## 2020-10-12 DIAGNOSIS — R41.3 MEMORY LOSS: ICD-10-CM

## 2020-10-12 DIAGNOSIS — D63.1 ANEMIA OF CHRONIC RENAL FAILURE, STAGE 4 (SEVERE) (H): ICD-10-CM

## 2020-10-12 DIAGNOSIS — R53.83 FATIGUE, UNSPECIFIED TYPE: ICD-10-CM

## 2020-10-12 DIAGNOSIS — N18.4 ANEMIA OF CHRONIC RENAL FAILURE, STAGE 4 (SEVERE) (H): ICD-10-CM

## 2020-10-12 DIAGNOSIS — Z23 FLU VACCINE NEED: ICD-10-CM

## 2020-10-12 DIAGNOSIS — Z79.4 TYPE 2 DIABETES MELLITUS WITH STAGE 4 CHRONIC KIDNEY DISEASE, WITH LONG-TERM CURRENT USE OF INSULIN (H): ICD-10-CM

## 2020-10-12 DIAGNOSIS — N18.4 TYPE 2 DIABETES MELLITUS WITH STAGE 4 CHRONIC KIDNEY DISEASE, WITH LONG-TERM CURRENT USE OF INSULIN (H): ICD-10-CM

## 2020-10-12 DIAGNOSIS — N18.9 CHRONIC KIDNEY DISEASE, UNSPECIFIED CKD STAGE: ICD-10-CM

## 2020-10-12 DIAGNOSIS — E11.22 TYPE 2 DIABETES MELLITUS WITH STAGE 4 CHRONIC KIDNEY DISEASE, WITH LONG-TERM CURRENT USE OF INSULIN (H): ICD-10-CM

## 2020-10-12 LAB
ALBUMIN SERPL-MCNC: 4.1 G/DL (ref 3.5–5)
ALP SERPL-CCNC: 49 U/L (ref 45–120)
ALT SERPL W P-5'-P-CCNC: <9 U/L (ref 0–45)
ANION GAP SERPL CALCULATED.3IONS-SCNC: 9 MMOL/L (ref 5–18)
AST SERPL W P-5'-P-CCNC: 13 U/L (ref 0–40)
BILIRUB SERPL-MCNC: 0.4 MG/DL (ref 0–1)
BUN SERPL-MCNC: 65 MG/DL (ref 8–28)
CALCIUM SERPL-MCNC: 9.9 MG/DL (ref 8.5–10.5)
CHLORIDE BLD-SCNC: 106 MMOL/L (ref 98–107)
CO2 SERPL-SCNC: 26 MMOL/L (ref 22–31)
CREAT SERPL-MCNC: 3.59 MG/DL (ref 0.7–1.3)
ERYTHROCYTE [DISTWIDTH] IN BLOOD BY AUTOMATED COUNT: 13.6 % (ref 11–14.5)
GFR SERPL CREATININE-BSD FRML MDRD: 17 ML/MIN/1.73M2
GLUCOSE BLD-MCNC: 88 MG/DL (ref 70–125)
HBA1C MFR BLD: 6.9 %
HCT VFR BLD AUTO: 30.8 % (ref 40–54)
HGB BLD-MCNC: 10.4 G/DL (ref 14–18)
MCH RBC QN AUTO: 30.8 PG (ref 27–34)
MCHC RBC AUTO-ENTMCNC: 33.8 G/DL (ref 32–36)
MCV RBC AUTO: 91 FL (ref 80–100)
PLATELET # BLD AUTO: 221 THOU/UL (ref 140–440)
PMV BLD AUTO: 9.1 FL (ref 7–10)
POTASSIUM BLD-SCNC: 5.2 MMOL/L (ref 3.5–5)
PROT SERPL-MCNC: 6.6 G/DL (ref 6–8)
RBC # BLD AUTO: 3.38 MILL/UL (ref 4.4–6.2)
SODIUM SERPL-SCNC: 141 MMOL/L (ref 136–145)
TSH SERPL DL<=0.005 MIU/L-ACNC: 0.71 UIU/ML (ref 0.3–5)
WBC: 4.9 THOU/UL (ref 4–11)

## 2020-10-27 ENCOUNTER — COMMUNICATION - HEALTHEAST (OUTPATIENT)
Dept: FAMILY MEDICINE | Facility: CLINIC | Age: 70
End: 2020-10-27

## 2020-10-28 ENCOUNTER — COMMUNICATION - HEALTHEAST (OUTPATIENT)
Dept: FAMILY MEDICINE | Facility: CLINIC | Age: 70
End: 2020-10-28

## 2020-10-28 DIAGNOSIS — E11.9 DM (DIABETES MELLITUS) (H): ICD-10-CM

## 2020-11-01 ENCOUNTER — COMMUNICATION - HEALTHEAST (OUTPATIENT)
Dept: FAMILY MEDICINE | Facility: CLINIC | Age: 70
End: 2020-11-01

## 2020-11-01 DIAGNOSIS — M10.9 GOUTY ARTHROPATHY: ICD-10-CM

## 2020-11-01 DIAGNOSIS — Z95.1 S/P CABG (CORONARY ARTERY BYPASS GRAFT): ICD-10-CM

## 2020-11-01 DIAGNOSIS — I10 BENIGN ESSENTIAL HYPERTENSION: ICD-10-CM

## 2020-11-01 DIAGNOSIS — E78.5 HYPERLIPIDEMIA, UNSPECIFIED HYPERLIPIDEMIA TYPE: ICD-10-CM

## 2020-11-04 RX ORDER — FUROSEMIDE 40 MG
TABLET ORAL
Qty: 90 TABLET | Refills: 3 | Status: SHIPPED | OUTPATIENT
Start: 2020-11-04 | End: 2021-01-01

## 2020-11-04 RX ORDER — DILTIAZEM HYDROCHLORIDE 300 MG/1
CAPSULE, COATED, EXTENDED RELEASE ORAL
Qty: 90 CAPSULE | Refills: 3 | Status: SHIPPED | OUTPATIENT
Start: 2020-11-04 | End: 2021-01-01

## 2020-11-04 RX ORDER — LOSARTAN POTASSIUM 100 MG/1
TABLET ORAL
Qty: 90 TABLET | Refills: 3 | Status: SHIPPED | OUTPATIENT
Start: 2020-11-04 | End: 2021-01-01

## 2020-11-04 RX ORDER — FENOFIBRATE 48 MG/1
TABLET, COATED ORAL
Qty: 90 TABLET | Refills: 3 | Status: SHIPPED | OUTPATIENT
Start: 2020-11-04 | End: 2021-01-01

## 2020-11-04 RX ORDER — ALLOPURINOL 100 MG/1
TABLET ORAL
Qty: 90 TABLET | Refills: 3 | Status: SHIPPED | OUTPATIENT
Start: 2020-11-04 | End: 2021-01-01

## 2020-11-04 RX ORDER — ATORVASTATIN CALCIUM 20 MG/1
TABLET, FILM COATED ORAL
Qty: 90 TABLET | Refills: 3 | Status: SHIPPED | OUTPATIENT
Start: 2020-11-04 | End: 2021-01-01

## 2020-11-14 ENCOUNTER — COMMUNICATION - HEALTHEAST (OUTPATIENT)
Dept: FAMILY MEDICINE | Facility: CLINIC | Age: 70
End: 2020-11-14

## 2020-11-14 DIAGNOSIS — Z79.4 TYPE 2 DIABETES MELLITUS WITH STAGE 4 CHRONIC KIDNEY DISEASE, WITH LONG-TERM CURRENT USE OF INSULIN (H): ICD-10-CM

## 2020-11-14 DIAGNOSIS — N18.4 TYPE 2 DIABETES MELLITUS WITH STAGE 4 CHRONIC KIDNEY DISEASE, WITH LONG-TERM CURRENT USE OF INSULIN (H): ICD-10-CM

## 2020-11-14 DIAGNOSIS — E11.22 TYPE 2 DIABETES MELLITUS WITH STAGE 4 CHRONIC KIDNEY DISEASE, WITH LONG-TERM CURRENT USE OF INSULIN (H): ICD-10-CM

## 2021-01-01 ENCOUNTER — TRANSFERRED RECORDS (OUTPATIENT)
Dept: HEALTH INFORMATION MANAGEMENT | Facility: CLINIC | Age: 71
End: 2021-01-01
Payer: COMMERCIAL

## 2021-01-01 ENCOUNTER — TELEPHONE (OUTPATIENT)
Dept: FAMILY MEDICINE | Facility: CLINIC | Age: 71
End: 2021-01-01

## 2021-01-01 ENCOUNTER — TRANSFERRED RECORDS (OUTPATIENT)
Dept: HEALTH INFORMATION MANAGEMENT | Facility: CLINIC | Age: 71
End: 2021-01-01

## 2021-01-01 ENCOUNTER — RECORDS - HEALTHEAST (OUTPATIENT)
Dept: ADMINISTRATIVE | Facility: CLINIC | Age: 71
End: 2021-01-01

## 2021-01-01 ENCOUNTER — COMMUNICATION - HEALTHEAST (OUTPATIENT)
Dept: FAMILY MEDICINE | Facility: CLINIC | Age: 71
End: 2021-01-01

## 2021-01-01 ENCOUNTER — RECORDS - HEALTHEAST (OUTPATIENT)
Dept: ADMINISTRATIVE | Facility: OTHER | Age: 71
End: 2021-01-01

## 2021-01-01 ENCOUNTER — TELEPHONE (OUTPATIENT)
Dept: NURSING | Facility: CLINIC | Age: 71
End: 2021-01-01

## 2021-01-01 ENCOUNTER — OFFICE VISIT (OUTPATIENT)
Dept: FAMILY MEDICINE | Facility: CLINIC | Age: 71
End: 2021-01-01
Payer: COMMERCIAL

## 2021-01-01 ENCOUNTER — HEALTH MAINTENANCE LETTER (OUTPATIENT)
Age: 71
End: 2021-01-01

## 2021-01-01 ENCOUNTER — AMBULATORY - HEALTHEAST (OUTPATIENT)
Dept: LAB | Facility: CLINIC | Age: 71
End: 2021-01-01

## 2021-01-01 ENCOUNTER — COMMUNICATION - HEALTHEAST (OUTPATIENT)
Dept: SCHEDULING | Facility: CLINIC | Age: 71
End: 2021-01-01

## 2021-01-01 VITALS — HEIGHT: 74 IN | WEIGHT: 242 LBS | BODY MASS INDEX: 31.06 KG/M2

## 2021-01-01 VITALS
WEIGHT: 214.1 LBS | BODY MASS INDEX: 29.04 KG/M2 | HEART RATE: 66 BPM | SYSTOLIC BLOOD PRESSURE: 152 MMHG | DIASTOLIC BLOOD PRESSURE: 62 MMHG

## 2021-01-01 VITALS
RESPIRATION RATE: 16 BRPM | WEIGHT: 230.6 LBS | BODY MASS INDEX: 29.61 KG/M2 | SYSTOLIC BLOOD PRESSURE: 138 MMHG | OXYGEN SATURATION: 97 % | RESPIRATION RATE: 16 BRPM | BODY MASS INDEX: 30.3 KG/M2 | DIASTOLIC BLOOD PRESSURE: 76 MMHG | SYSTOLIC BLOOD PRESSURE: 153 MMHG | DIASTOLIC BLOOD PRESSURE: 75 MMHG | HEART RATE: 87 BPM | WEIGHT: 236 LBS | OXYGEN SATURATION: 99 % | HEART RATE: 96 BPM

## 2021-01-01 VITALS — BODY MASS INDEX: 31.07 KG/M2 | WEIGHT: 242 LBS

## 2021-01-01 VITALS — BODY MASS INDEX: 33.4 KG/M2 | HEIGHT: 73 IN | WEIGHT: 252 LBS

## 2021-01-01 VITALS — BODY MASS INDEX: 30.81 KG/M2 | WEIGHT: 240 LBS

## 2021-01-01 VITALS — HEIGHT: 73 IN | WEIGHT: 255.3 LBS | BODY MASS INDEX: 33.83 KG/M2

## 2021-01-01 VITALS — WEIGHT: 257.3 LBS | HEIGHT: 73 IN | BODY MASS INDEX: 34.1 KG/M2

## 2021-01-01 VITALS — BODY MASS INDEX: 30.56 KG/M2 | WEIGHT: 238 LBS

## 2021-01-01 VITALS — WEIGHT: 240 LBS | BODY MASS INDEX: 30.81 KG/M2

## 2021-01-01 VITALS
WEIGHT: 242 LBS | RESPIRATION RATE: 16 BRPM | DIASTOLIC BLOOD PRESSURE: 64 MMHG | HEIGHT: 72 IN | WEIGHT: 222 LBS | BODY MASS INDEX: 31.07 KG/M2 | SYSTOLIC BLOOD PRESSURE: 142 MMHG | HEART RATE: 64 BPM | BODY MASS INDEX: 30.07 KG/M2

## 2021-01-01 VITALS — WEIGHT: 262.9 LBS | BODY MASS INDEX: 34.84 KG/M2 | HEIGHT: 73 IN

## 2021-01-01 VITALS — WEIGHT: 241 LBS | BODY MASS INDEX: 30.94 KG/M2

## 2021-01-01 VITALS — BODY MASS INDEX: 30.94 KG/M2 | WEIGHT: 241 LBS

## 2021-01-01 VITALS — HEIGHT: 74 IN | WEIGHT: 237 LBS | BODY MASS INDEX: 30.42 KG/M2

## 2021-01-01 VITALS
SYSTOLIC BLOOD PRESSURE: 134 MMHG | RESPIRATION RATE: 16 BRPM | BODY MASS INDEX: 29.76 KG/M2 | OXYGEN SATURATION: 97 % | DIASTOLIC BLOOD PRESSURE: 75 MMHG | WEIGHT: 231.8 LBS | TEMPERATURE: 99 F | HEART RATE: 96 BPM

## 2021-01-01 VITALS — BODY MASS INDEX: 30.61 KG/M2 | WEIGHT: 238.4 LBS

## 2021-01-01 VITALS — WEIGHT: 243 LBS | BODY MASS INDEX: 31.2 KG/M2

## 2021-01-01 VITALS — WEIGHT: 260 LBS | HEIGHT: 73 IN | BODY MASS INDEX: 34.46 KG/M2

## 2021-01-01 VITALS
OXYGEN SATURATION: 99 % | SYSTOLIC BLOOD PRESSURE: 115 MMHG | HEART RATE: 94 BPM | RESPIRATION RATE: 16 BRPM | DIASTOLIC BLOOD PRESSURE: 66 MMHG | TEMPERATURE: 97.8 F

## 2021-01-01 VITALS
BODY MASS INDEX: 30.11 KG/M2 | SYSTOLIC BLOOD PRESSURE: 136 MMHG | HEART RATE: 63 BPM | OXYGEN SATURATION: 99 % | DIASTOLIC BLOOD PRESSURE: 76 MMHG | WEIGHT: 222 LBS

## 2021-01-01 VITALS — BODY MASS INDEX: 30.3 KG/M2 | WEIGHT: 236 LBS

## 2021-01-01 VITALS — HEIGHT: 74 IN | BODY MASS INDEX: 31.07 KG/M2 | WEIGHT: 242 LBS | BODY MASS INDEX: 31.07 KG/M2

## 2021-01-01 VITALS — BODY MASS INDEX: 31.2 KG/M2 | WEIGHT: 243 LBS

## 2021-01-01 VITALS — WEIGHT: 239.3 LBS | BODY MASS INDEX: 30.72 KG/M2

## 2021-01-01 VITALS — WEIGHT: 236.2 LBS | BODY MASS INDEX: 30.33 KG/M2

## 2021-01-01 VITALS — WEIGHT: 254 LBS | BODY MASS INDEX: 33.51 KG/M2

## 2021-01-01 VITALS — WEIGHT: 239 LBS | BODY MASS INDEX: 30.69 KG/M2

## 2021-01-01 VITALS — BODY MASS INDEX: 30.17 KG/M2 | WEIGHT: 235 LBS

## 2021-01-01 VITALS — WEIGHT: 240.6 LBS | BODY MASS INDEX: 30.89 KG/M2

## 2021-01-01 VITALS — WEIGHT: 242 LBS | BODY MASS INDEX: 31.07 KG/M2

## 2021-01-01 VITALS — HEIGHT: 74 IN | BODY MASS INDEX: 30.42 KG/M2 | WEIGHT: 237 LBS

## 2021-01-01 VITALS — WEIGHT: 246.8 LBS | BODY MASS INDEX: 32.71 KG/M2 | HEIGHT: 73 IN

## 2021-01-01 VITALS — WEIGHT: 252 LBS | BODY MASS INDEX: 33.25 KG/M2

## 2021-01-01 VITALS — BODY MASS INDEX: 31.93 KG/M2 | WEIGHT: 242 LBS

## 2021-01-01 VITALS — BODY MASS INDEX: 31.06 KG/M2 | WEIGHT: 242 LBS | HEIGHT: 74 IN

## 2021-01-01 DIAGNOSIS — E78.5 HYPERLIPIDEMIA, UNSPECIFIED HYPERLIPIDEMIA TYPE: ICD-10-CM

## 2021-01-01 DIAGNOSIS — N18.4 ANEMIA OF CHRONIC RENAL FAILURE, STAGE 4 (SEVERE) (H): ICD-10-CM

## 2021-01-01 DIAGNOSIS — N18.4 CHRONIC KIDNEY DISEASE, STAGE IV (SEVERE) (H): ICD-10-CM

## 2021-01-01 DIAGNOSIS — I25.10 CORONARY ARTERY DISEASE INVOLVING NATIVE HEART WITHOUT ANGINA PECTORIS, UNSPECIFIED VESSEL OR LESION TYPE: ICD-10-CM

## 2021-01-01 DIAGNOSIS — N18.4 TYPE 2 DIABETES MELLITUS WITH STAGE 4 CHRONIC KIDNEY DISEASE, WITH LONG-TERM CURRENT USE OF INSULIN (H): Primary | ICD-10-CM

## 2021-01-01 DIAGNOSIS — Z95.1 S/P CABG (CORONARY ARTERY BYPASS GRAFT): ICD-10-CM

## 2021-01-01 DIAGNOSIS — E11.22 TYPE 2 DIABETES MELLITUS WITH STAGE 4 CHRONIC KIDNEY DISEASE, WITH LONG-TERM CURRENT USE OF INSULIN (H): ICD-10-CM

## 2021-01-01 DIAGNOSIS — M10.9 GOUTY ARTHROPATHY: ICD-10-CM

## 2021-01-01 DIAGNOSIS — Z79.4 TYPE 2 DIABETES MELLITUS WITH STAGE 4 CHRONIC KIDNEY DISEASE, WITH LONG-TERM CURRENT USE OF INSULIN (H): ICD-10-CM

## 2021-01-01 DIAGNOSIS — E11.22 TYPE 2 DIABETES MELLITUS WITH STAGE 4 CHRONIC KIDNEY DISEASE, WITH LONG-TERM CURRENT USE OF INSULIN (H): Primary | ICD-10-CM

## 2021-01-01 DIAGNOSIS — I10 BENIGN ESSENTIAL HYPERTENSION: ICD-10-CM

## 2021-01-01 DIAGNOSIS — N18.4 TYPE 2 DIABETES MELLITUS WITH STAGE 4 CHRONIC KIDNEY DISEASE, WITH LONG-TERM CURRENT USE OF INSULIN (H): ICD-10-CM

## 2021-01-01 DIAGNOSIS — G31.84 MCI (MILD COGNITIVE IMPAIRMENT): ICD-10-CM

## 2021-01-01 DIAGNOSIS — Z95.1 PRESENCE OF AORTOCORONARY BYPASS GRAFT: ICD-10-CM

## 2021-01-01 DIAGNOSIS — Z79.4 TYPE 2 DIABETES MELLITUS WITH STAGE 4 CHRONIC KIDNEY DISEASE, WITH LONG-TERM CURRENT USE OF INSULIN (H): Primary | ICD-10-CM

## 2021-01-01 DIAGNOSIS — D63.1 ANEMIA OF CHRONIC RENAL FAILURE, STAGE 4 (SEVERE) (H): ICD-10-CM

## 2021-01-01 LAB
ALBUMIN SERPL-MCNC: 4.1 G/DL (ref 3.5–5)
ANION GAP SERPL CALCULATED.3IONS-SCNC: 8 MMOL/L (ref 5–18)
BUN SERPL-MCNC: 63 MG/DL (ref 8–28)
CALCIUM SERPL-MCNC: 10.3 MG/DL (ref 8.5–10.5)
CHLORIDE BLD-SCNC: 109 MMOL/L (ref 98–107)
CO2 SERPL-SCNC: 23 MMOL/L (ref 22–31)
CREAT SERPL-MCNC: 3.36 MG/DL (ref 0.7–1.3)
GFR SERPL CREATININE-BSD FRML MDRD: 17 ML/MIN/1.73M2
GLUCOSE BLD-MCNC: 89 MG/DL (ref 70–125)
HBA1C MFR BLD: 5.3 % (ref 0–5.6)
HGB BLD-MCNC: 11.4 G/DL (ref 13.3–17.7)
MAGNESIUM SERPL-MCNC: 2 MG/DL (ref 1.8–2.6)
PHOSPHATE SERPL-MCNC: 3.7 MG/DL (ref 2.5–4.5)
POTASSIUM BLD-SCNC: 5.1 MMOL/L (ref 3.5–5)
SODIUM SERPL-SCNC: 140 MMOL/L (ref 136–145)

## 2021-01-01 PROCEDURE — G0010 ADMIN HEPATITIS B VACCINE: HCPCS | Performed by: FAMILY MEDICINE

## 2021-01-01 PROCEDURE — 90472 IMMUNIZATION ADMIN EACH ADD: CPT | Performed by: FAMILY MEDICINE

## 2021-01-01 PROCEDURE — 85018 HEMOGLOBIN: CPT | Performed by: FAMILY MEDICINE

## 2021-01-01 PROCEDURE — 99214 OFFICE O/P EST MOD 30 MIN: CPT | Mod: 25 | Performed by: FAMILY MEDICINE

## 2021-01-01 PROCEDURE — 83036 HEMOGLOBIN GLYCOSYLATED A1C: CPT | Performed by: FAMILY MEDICINE

## 2021-01-01 PROCEDURE — 80069 RENAL FUNCTION PANEL: CPT | Performed by: FAMILY MEDICINE

## 2021-01-01 PROCEDURE — 90746 HEPB VACCINE 3 DOSE ADULT IM: CPT | Performed by: FAMILY MEDICINE

## 2021-01-01 PROCEDURE — 83735 ASSAY OF MAGNESIUM: CPT | Performed by: FAMILY MEDICINE

## 2021-01-01 PROCEDURE — 36415 COLL VENOUS BLD VENIPUNCTURE: CPT | Performed by: FAMILY MEDICINE

## 2021-01-01 PROCEDURE — 90632 HEPA VACCINE ADULT IM: CPT | Performed by: FAMILY MEDICINE

## 2021-01-01 RX ORDER — DILTIAZEM HYDROCHLORIDE 300 MG/1
1 CAPSULE, EXTENDED RELEASE ORAL
COMMUNITY
Start: 2020-11-04

## 2021-01-01 RX ORDER — INSULIN GLARGINE 100 [IU]/ML
INJECTION, SOLUTION SUBCUTANEOUS
Qty: 15 ML | Refills: 0 | Status: SHIPPED | OUTPATIENT
Start: 2021-01-01 | End: 2021-01-01

## 2021-01-01 RX ORDER — ATORVASTATIN CALCIUM 20 MG/1
TABLET, FILM COATED ORAL
Qty: 90 TABLET | Refills: 3 | Status: SHIPPED | OUTPATIENT
Start: 2021-01-01

## 2021-01-01 RX ORDER — ALLOPURINOL 100 MG/1
TABLET ORAL
Qty: 90 TABLET | Refills: 3 | Status: SHIPPED | OUTPATIENT
Start: 2021-01-01

## 2021-01-01 RX ORDER — FENOFIBRATE 48 MG/1
TABLET, COATED ORAL
Qty: 90 TABLET | Refills: 3 | Status: SHIPPED | OUTPATIENT
Start: 2021-01-01

## 2021-01-01 RX ORDER — ISOSORBIDE MONONITRATE 60 MG/1
TABLET, EXTENDED RELEASE ORAL
Qty: 90 TABLET | Refills: 3 | Status: SHIPPED | OUTPATIENT
Start: 2021-01-01

## 2021-01-01 RX ORDER — INSULIN GLARGINE 100 [IU]/ML
10 INJECTION, SOLUTION SUBCUTANEOUS DAILY
Qty: 15 ML | Refills: 3 | Status: SHIPPED | OUTPATIENT
Start: 2021-01-01

## 2021-01-01 RX ORDER — FUROSEMIDE 20 MG
20 TABLET ORAL DAILY
COMMUNITY

## 2021-01-01 RX ORDER — FERROUS SULFATE 325(65) MG
2 TABLET ORAL
COMMUNITY
Start: 2021-01-01

## 2021-01-01 RX ORDER — DILTIAZEM HYDROCHLORIDE 300 MG/1
CAPSULE, COATED, EXTENDED RELEASE ORAL
Qty: 90 CAPSULE | Refills: 3 | Status: SHIPPED | OUTPATIENT
Start: 2021-01-01

## 2021-01-01 RX ORDER — LOSARTAN POTASSIUM 100 MG/1
TABLET ORAL
Qty: 90 TABLET | Refills: 3 | Status: SHIPPED | OUTPATIENT
Start: 2021-01-01

## 2021-01-01 RX ORDER — FUROSEMIDE 40 MG
TABLET ORAL
Qty: 90 TABLET | Refills: 3 | Status: SHIPPED | OUTPATIENT
Start: 2021-01-01

## 2021-01-22 ENCOUNTER — COMMUNICATION - HEALTHEAST (OUTPATIENT)
Dept: FAMILY MEDICINE | Facility: CLINIC | Age: 71
End: 2021-01-22

## 2021-01-22 DIAGNOSIS — Z79.4 TYPE 2 DIABETES MELLITUS WITH STAGE 4 CHRONIC KIDNEY DISEASE, WITH LONG-TERM CURRENT USE OF INSULIN (H): ICD-10-CM

## 2021-01-22 DIAGNOSIS — E11.22 TYPE 2 DIABETES MELLITUS WITH STAGE 4 CHRONIC KIDNEY DISEASE, WITH LONG-TERM CURRENT USE OF INSULIN (H): ICD-10-CM

## 2021-01-22 DIAGNOSIS — N18.4 TYPE 2 DIABETES MELLITUS WITH STAGE 4 CHRONIC KIDNEY DISEASE, WITH LONG-TERM CURRENT USE OF INSULIN (H): ICD-10-CM

## 2021-01-24 RX ORDER — LIRAGLUTIDE 6 MG/ML
0.6 INJECTION SUBCUTANEOUS DAILY
Qty: 9 ML | Refills: 3 | Status: SHIPPED | OUTPATIENT
Start: 2021-01-24 | End: 2021-01-01

## 2021-02-17 ENCOUNTER — RECORDS - HEALTHEAST (OUTPATIENT)
Dept: ADMINISTRATIVE | Facility: OTHER | Age: 71
End: 2021-02-17

## 2021-05-27 NOTE — TELEPHONE ENCOUNTER
Pt.s wife came in and dropped off a form for her  that needs to be filled out for the DMV. (Insulin-Treated Diabetes Report).  When complete, please fax to [phone].    Thanks.

## 2021-05-28 NOTE — TELEPHONE ENCOUNTER
Refill Approved    Rx renewed per Medication Renewal Policy. Medication was last renewed on 6/21/18.    Erin Fernandez, [facility] Triage/Med Refill 4/23/2019     Requested Prescriptions   Pending Prescriptions Disp Refills     atorvastatin (LIPITOR) 20 MG tablet [Pharmacy Med Name: ATORVASTATIN 20 MG TABLET] 90 tablet 2     Sig: TAKE 1 TABLET BY MOUTH EVERY DAY       Statins Refill Protocol (Hmg CoA Reductase Inhibitors) Passed - 4/22/2019  1:23 AM        Passed - PCP or prescribing provider visit in past 12 months      Last office visit with prescriber/PCP: 1/21/2019 Brandan Arndt MD OR same dept: 1/21/2019 Brandan Arndt MD OR same specialty: 1/21/2019 Brandan Arndt MD  Last physical: 4/17/2018 Last MTM visit: Visit date not found   Next visit within 3 mo: Visit date not found  Next physical within 3 mo: Visit date not found  Prescriber OR PCP: Brandan Arndt MD  Last diagnosis associated with med order: 1. Hyperlipidemia, unspecified hyperlipidemia type  - atorvastatin (LIPITOR) 20 MG tablet [Pharmacy Med Name: ATORVASTATIN 20 MG TABLET]; TAKE 1 TABLET BY MOUTH EVERY DAY  Dispense: 90 tablet; Refill: 2    If protocol passes may refill for 12 months if within 3 months of last provider visit (or a total of 15 months).

## 2021-05-30 NOTE — TELEPHONE ENCOUNTER
We should probably see Fernando in the next few weeks for a 6 month follow-up.    40 minutes would be ideal thank you.

## 2021-05-30 NOTE — TELEPHONE ENCOUNTER
RN cannot approve Refill Request    RN can NOT refill this medication med is not covered by policy/route to provider. Last office visit: 1/21/2019 Brandan Arndt MD Last Physical: 4/17/2018 Last MTM visit: Visit date not found Last visit same specialty: 1/21/2019 Brandan Arndt MD.  Next visit within 3 mo: Visit date not found  Next physical within 3 mo: Visit date not found      Eva Chow, [facility] Connection Triage/Med Refill 6/28/2019    Requested Prescriptions   Pending Prescriptions Disp Refills     allopurinol (ZYLOPRIM) 100 MG tablet [Pharmacy Med Name: ALLOPURINOL 100 MG TABLET] 90 tablet 2     Sig: TAKE 1 TABLET (100 MG TOTAL) BY MOUTH DAILY.       There is no refill protocol information for this order

## 2021-06-01 NOTE — TELEPHONE ENCOUNTER
Refill Approved    Rx renewed per Medication Renewal Policy. Medication was last renewed on 8/22/18 .    Rufina Kaminski, [facility] Triage/Med Refill 9/8/2019     Requested Prescriptions   Pending Prescriptions Disp Refills     furosemide (LASIX) 40 MG tablet [Pharmacy Med Name: FUROSEMIDE 40 MG TABLET] 90 tablet 3     Sig: TAKE 1 TABLET BY MOUTH EVERY DAY       Diuretics/Combination Diuretics Refill Protocol  Passed - 9/8/2019  8:51 AM        Passed - Visit with PCP or prescribing provider visit in past 12 months     Last office visit with prescriber/PCP: 1/21/2019 Brandan Arndt MD OR same dept: 1/21/2019 Brandan Arndt MD OR same specialty: 1/21/2019 Brandan Arndt MD  Last physical: 4/17/2018 Last MTM visit: Visit date not found   Next visit within 3 mo: Visit date not found  Next physical within 3 mo: Visit date not found  Prescriber OR PCP: Brandan Arndt MD  Last diagnosis associated with med order: 1. S/P CABG (coronary artery bypass graft)  - furosemide (LASIX) 40 MG tablet [Pharmacy Med Name: FUROSEMIDE 40 MG TABLET]; TAKE 1 TABLET BY MOUTH EVERY DAY  Dispense: 90 tablet; Refill: 3    If protocol passes may refill for 12 months if within 3 months of last provider visit (or a total of 15 months).             Passed - Serum Potassium in past 12 months      Lab Results   Component Value Date    Potassium 4.7 01/21/2019             Passed - Serum Sodium in past 12 months      Lab Results   Component Value Date    Sodium 140 01/21/2019             Passed - Blood pressure on file in past 12 months     BP Readings from Last 1 Encounters:   01/21/19 144/84             Passed - Serum Creatinine in past 12 months      Creatinine   Date Value Ref Range Status   01/21/2019 2.90 (H) 0.70 - 1.30 mg/dL Final

## 2021-06-02 NOTE — TELEPHONE ENCOUNTER
Question following Office Visit  When did you see your provider: 10/14/19  What is your question: Caller stated that all of the patient's medications were supposed to be renewed and sent to [id number]. Caller stated that she does not know the names of the medications.  Okay to leave a detailed message: Yes  [phone]

## 2021-06-02 NOTE — TELEPHONE ENCOUNTER
"RN cannot approve Refill Request    RN can NOT refill this medication PCP messaged that patient is overdue for Office Visit. Last office visit: 1/21/2019 Brandan Arndt MD Last Physical: 4/17/2018 Last MTM visit: Visit date not found Last visit same specialty: 1/21/2019 Brandan Arndt MD.  Next visit within 3 mo: Visit date not found  Next physical within 3 mo: Visit date not found      Calvin Penn, [facility] Connection Triage/Med Refill 10/13/2019    Requested Prescriptions   Pending Prescriptions Disp Refills     BD ULTRA-FINE SHORT PEN NEEDLE 31 gauge x 5/16\" Ndle [Pharmacy Med Name: BD UF SHORT PEN NEEDLE [id number]]  7     Sig: USE AS DIRECTED       Diabetic Supplies Refill Protocol Failed - 10/11/2019 12:36 PM        Failed - Visit with PCP or prescribing provider visit in last 6 months     Last office visit with prescriber/PCP: 1/21/2019 Brandan Arndt MD OR same dept: 1/21/2019 Brandan Arndt MD OR same specialty: 1/21/2019 Brandan Arndt MD  Last physical: 4/17/2018 Last MTM visit: Visit date not found   Next visit within 3 mo: Visit date not found  Next physical within 3 mo: Visit date not found  Prescriber OR PCP: Brandan Arndt MD  Last diagnosis associated with med order: 1. DM (diabetes mellitus) (H)  - BD ULTRA-FINE SHORT PEN NEEDLE 31 gauge x 5/16\" Ndle [Pharmacy Med Name: BD UF SHORT PEN NEEDLE [id number]]; USE AS DIRECTED; Refill: 7    If protocol passes may refill for 12 months if within 3 months of last provider visit (or a total of 15 months).             Failed - A1C in last 6 months     Hemoglobin A1c   Date Value Ref Range Status   01/21/2019 7.1 (H) 3.5 - 6.0 % Final                  "

## 2021-06-02 NOTE — PROGRESS NOTES
Patient ID: Sachin Berry is a 69 y.o. male.  /76   Pulse 96   Resp 16   Wt (!) 236 lb (107 kg)   SpO2 97%   BMI 30.30 kg/m      Assessment/Plan:                   Diagnoses and all orders for this visit:    Type 2 diabetes mellitus (H)  -     Glycosylated Hemoglobin A1c  -     Glucose monitor  -     blood glucose test (ACCU-CHEK SMARTVIEW TEST STRIP) strips; USE ONE STRIP WITH YOUR ACCU-CHEK JEANNIE METER TO CHECK BLOOD SUGARS 2 TIME(S) DAILY,DX CODE E11.9  Dispense: 300 strip; Refill: 3    Diabetes (H)    Acute kidney failure, unspecified (H)  -     Renal Function Profile  -     Hemoglobin  -     Hepatic Profile  -     Magnesium  -     Parathyroid Hormone Intact  -     Protein/Creatinine Ratio, Urine    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)  -     Renal Function Profile  -     Hemoglobin  -     Hepatic Profile  -     Magnesium  -     Parathyroid Hormone Intact  -     Protein/Creatinine Ratio, Urine    Diabetic glomerulopathy (H)  -     Renal Function Profile  -     Hemoglobin  -     Hepatic Profile  -     Magnesium  -     Parathyroid Hormone Intact  -     Protein/Creatinine Ratio, Urine    Hyperpotassemia  -     Renal Function Profile  -     Hemoglobin  -     Hepatic Profile  -     Magnesium  -     Parathyroid Hormone Intact  -     Protein/Creatinine Ratio, Urine    Chronic kidney disease, stage IV (severe) (H)  -     Renal Function Profile  -     Hemoglobin  -     Hepatic Profile  -     Magnesium  -     Parathyroid Hormone Intact  -     Protein/Creatinine Ratio, Urine    Anemia of chronic renal failure  -     Renal Function Profile  -     Hemoglobin  -     Hepatic Profile  -     Magnesium  -     Parathyroid Hormone Intact  -     Protein/Creatinine Ratio, Urine    S/P CABG (coronary artery bypass graft)  -     Ambulatory referral to Cardiology    Coronary artery disease involving native coronary artery of native heart with unstable angina pectoris (H)  -     Ambulatory referral to Cardiology    [facility]  loss  -     Ambulatory referral to Dementia/Memory Loss Clinic    Other orders  -     Influenza High Dose, Seasonal 65+ yrs  -     Cancel: Microalbumin, Random Urine        DISCUSSION  See discussion below.  Overall doing well.  Referrals as noted above.  Obtain additional labs to be sent to nephrology.  Flu shot today.  Change glipizide to be taken with evening meal not at bedtime.  Reasonable to change atorvastatin to be taken with the evening meal as well if this will make administration easier.  Continue to check blood sugars.  Testing supplies sent.    Subjective:     HPI    Sachin Berry is a 69 y.o. male reports no concerns with blood sugar management.  Continues to take glipizide half tablet twice daily and Lantus insulin.  Not taking glipizide with meals.  Discussed changing to take with meals.  Otherwise no concerns.  Up-to-date with monitoring parameters including eye care.  Does have neuropathy.  Foot exam performed at last visit.  Reports no new concerns.    He has coronary disease.  Underwent coronary bypass graft surgery about 18 months ago.  Denies any concerns such as chest pain shortness of breath or lower extremity edema.  Has had weight loss and been able to maintain weight loss since that time.  He remains on medications for risk reduction.  He does have mild aortic stenosis noted on echocardiogram from 2018.  Was to have follow-up with cardiology this past summer.  Discussed helping to arrange for routine follow-up.    His chronic kidney disease stage IV.  Has been stable.  Follows with nephrology.  Upcoming appointment.  Is to have labs performed today.    Has long-standing memory difficulty that is gradually evolving.  Family is noticing this more and more.  Family is able to provide care and redirect as appropriate.  No safety concerns have arisen in this regard.  This is been gradually progressive.  They have used CBD oil which they feel helps him keep calm and be less frustrated and  "doing projects which he enjoys doing such as at his cabin or his home.  Discussed possible underlying medical concerns which may contribute to some of the symptoms such as untreated sleep apnea (was evaluated previously and determined to be relatively mild but he is not treated).  Discussed that other chronic medical concerns including chronic kidney disease, heart disease and diabetes may also be contributing factors.  We were not able to identify distinct depression symptoms but this would be another potential factor.  We also discussed that there is a likelihood given the clinical course and what is described that there may be an underlying dementia process.  I discussed with him that I do not know about CBD oil and the role it may play in this situation.  I discussed that I do not recommend it but if they wish to continue that would be their choice as I do not see that it is directly causing any harm at this point.  We did discussion today about proceeding with further evaluation through the help of specialty clinic at [facility] to decide on a more distinct diagnosis and further course of action.  We discussed in the meantime we will continue to work diligently to manage his chronic medical concerns as well as possible.  Review of Systems  Complete review of systems is obtained.  Other than the specific considerations noted above complete review of systems is negative.          Objective:   Medications:  Current Outpatient Medications   Medication Sig Note     allopurinol (ZYLOPRIM) 100 MG tablet TAKE 1 TABLET (100 MG TOTAL) BY MOUTH DAILY.      aspirin 81 MG EC tablet Take 81 mg by mouth every evening.       atorvastatin (LIPITOR) 20 MG tablet TAKE 1 TABLET BY MOUTH EVERY DAY      BD ULTRA-FINE SHORT PEN NEEDLE 31 gauge x 5/16\" Ndle USE AS DIRECTED      blood glucose test strips Use 1 each As Directed 3 (three) times a day. Dispense brand per patient's insurance at pharmacy discretion.      " cyanocobalamin 1000 MCG tablet Take 1,000 mcg by mouth daily.      diltiazem (CARDIZEM CD) 300 MG 24 hr capsule Take 1 capsule (300 mg total) by mouth daily.      fenofibrate (TRICOR) 48 MG tablet Take 1 tablet (48 mg total) by mouth daily.      furosemide (LASIX) 40 MG tablet Take 1 tablet (40 mg total) by mouth daily.      glipiZIDE (GLUCOTROL) 5 MG tablet Take 0.5 tablets (2.5 mg total) by mouth 2 (two) times a day before meals. 1/2 Hour BEFORE meals      insulin glargine (LANTUS SOLOSTAR U-100 INSULIN) 100 unit/mL (3 mL) pen INJECT 40 UNITS UNDER THE SKIN DAILY      isosorbide mononitrate (IMDUR) 30 MG 24 hr tablet Take 30 mg by mouth daily.      LANTUS U-100 INSULIN 100 unit/mL injection Inject 25 Units under the skin at bedtime.      losartan (COZAAR) 100 MG tablet Take 100 mg by mouth daily.  6/22/2018: Received from: External Pharmacy     nitroglycerin (NITROSTAT) 0.4 MG SL tablet Place 1 tablet (0.4 mg total) under the tongue every 5 (five) minutes as needed for chest pain.      omega-3/dha/epa/fish oil (FISH OIL-OMEGA-3 FATTY ACIDS) 300-1,000 mg capsule Take 1 g by mouth daily.      blood glucose test (ACCU-CHEK SMARTVIEW TEST STRIP) strips USE ONE STRIP WITH YOUR ACCU-CHEK JEANNIE METER TO CHECK BLOOD SUGARS 2 TIME(S) DAILY,DX CODE E11.9        Allergies:  Allergies   Allergen Reactions     Blood-Group Specific Substance      Anti-E.  Allow additional time for obtaining blood for transfusion.      Amlodipine      Edema       Amoxicillin-Pot Clavulanate Hives     Indomethacin      Dyspepsia       Metoprolol Succinate Hives       Tobacco:   reports that he has never smoked. He has never used smokeless tobacco.     Physical Exam          /76   Pulse 96   Resp 16   Wt (!) 236 lb (107 kg)   SpO2 97%   BMI 30.30 kg/m          General Appearance:    Alert, cooperative, no distress   Eyes:   No scleral icterus or conjunctival irritation       Lungs:     Clear to auscultation bilaterally, respirations  unlabored, no wheezes or crackles   Heart:    Regular rate and rhythm, soft systolic murmur heard best at right sternal border   Abdomen:    Soft, no distention, no tenderness on palpation, no masses, no organomegaly     Extremities:  No edema, no joint swelling or redness, no evidence of any injuries   Skin:  No concerning skin findings, no suspicious moles, no rashes   Neurologic:  On gross examination there is no motor or sensory deficit.  Patient walks with a normal gait

## 2021-06-02 NOTE — TELEPHONE ENCOUNTER
All of the medications have now been renewed.  It appeared as though not all medications were in need of refill at the time of their visit.

## 2021-06-03 NOTE — PROGRESS NOTES
"Provider wore a mask during this visit.   Subjective:   Sachin Berry is a 69 y.o. male  Roomed by: Savi CORDERO LPN    Accompanied by Spouse      Chief Complaint   Patient presents with     Cough     x1 week     Fever     Shortness of Breath     Wheezing   Symptoms started on 11/18. BS has been running in the 120's. Wife says she is worried because 's appetite has been down for the last couple of days. Taking 25 units of lantus daily. Denies headaches. Admits some recent dizziness. Denies any chest pain. Wife admits she also has a cough recently. He has felt feverish at home. Admits nausea and vomiting. Energy level has been down. Appetite has been down. Admits that he has been drinking and urinating less than usual. Denies any recent nausea, vomiting, belly pain, or diarrhea.     Review of Systems  See HPI for ROS, otherwise balance of other systems negative    Allergies   Allergen Reactions     Blood-Group Specific Substance      Anti-E.  Allow additional time for obtaining blood for transfusion.      Amlodipine      Edema       Amoxicillin-Pot Clavulanate Hives     Indomethacin      Dyspepsia       Metoprolol Succinate Hives       Current Outpatient Medications:      allopurinol (ZYLOPRIM) 100 MG tablet, Take 1 tablet (100 mg total) by mouth daily., Disp: 90 tablet, Rfl: 3     aspirin 81 MG EC tablet, Take 81 mg by mouth every evening. , Disp: , Rfl:      atorvastatin (LIPITOR) 20 MG tablet, Take 1 tablet (20 mg total) by mouth daily., Disp: 90 tablet, Rfl: 3     BD ULTRA-FINE SHORT PEN NEEDLE 31 gauge x 5/16\" Ndle, USE AS DIRECTED, Disp: 100 each, Rfl: 7     blood glucose meter (GLUCOMETER), Use 1 each As Directed as needed. Dispense glucometer brand per patient's insurance at pharmacy discretion., Disp: , Rfl: 0     blood glucose test (ACCU-CHEK SMARTVIEW TEST STRIP) strips, USE ONE STRIP WITH YOUR ACCU-CHEK JEANNIE METER TO CHECK BLOOD SUGARS 2 TIME(S) DAILY,DX CODE E11.9, Disp: 300 strip, Rfl: 3     " blood glucose test strips, Use 1 each As Directed 3 (three) times a day. Dispense brand per patient's insurance at pharmacy discretion., Disp: 300 strip, Rfl: 3     cyanocobalamin 1000 MCG tablet, Take 1,000 mcg by mouth daily., Disp: , Rfl:      diltiazem (CARDIZEM CD) 300 MG 24 hr capsule, Take 1 capsule (300 mg total) by mouth daily., Disp: 90 capsule, Rfl: 3     fenofibrate (TRICOR) 48 MG tablet, Take 1 tablet (48 mg total) by mouth daily., Disp: 90 tablet, Rfl: 3     furosemide (LASIX) 40 MG tablet, Take 1 tablet (40 mg total) by mouth daily., Disp: 90 tablet, Rfl: 3     generic lancets (FINGERSTIX LANCETS), Dispense brand per patient's insurance at pharmacy discretion., Disp: 200 each, Rfl: 3     glipiZIDE (GLUCOTROL) 5 MG tablet, Take 0.5 tablets (2.5 mg total) by mouth 2 (two) times a day before meals. 1/2 Hour BEFORE meals, Disp: 90 tablet, Rfl: 3     insulin glargine (LANTUS SOLOSTAR U-100 INSULIN) 100 unit/mL (3 mL) pen, INJECT 40 UNITS UNDER THE SKIN DAILY, Disp: 15 adj dose pen, Rfl: 5     isosorbide mononitrate (IMDUR) 30 MG 24 hr tablet, Take 1 tablet (30 mg total) by mouth daily., Disp: 90 tablet, Rfl: 3     LANTUS U-100 INSULIN 100 unit/mL injection, Inject 25 Units under the skin at bedtime., Disp: 10 mL, Rfl: PRN     losartan (COZAAR) 100 MG tablet, Take 1 tablet (100 mg total) by mouth daily., Disp: 90 tablet, Rfl: 3     nitroglycerin (NITROSTAT) 0.4 MG SL tablet, Place 1 tablet (0.4 mg total) under the tongue every 5 (five) minutes as needed for chest pain., Disp: 25 tablet, Rfl: 3     omega-3/dha/epa/fish oil (FISH OIL-OMEGA-3 FATTY ACIDS) 300-1,000 mg capsule, Take 1 g by mouth daily., Disp: , Rfl:   Patient Active Problem List   Diagnosis     Diabetic polyneuropathy associated with type 2 diabetes mellitus (H)     Diarrhea     Pneumonia     Hearing Loss     Right Rotator Cuff Tendonitis     Strained Right Biceps Tendon     Type 2 diabetes mellitus (H)     Hyperlipidemia     Gout     Anemia      Hypertension     Chronic Kidney Disease, Stage 3     Open Fracture Of The Distal Phalanx Of The Left Second Finger     Open Wound Of The Left Index Finger     Tenosynovitis Of The Left Hand/Wrist     Fatigue     Polyarthralgia     Cervicalgia     Low back pain without sciatica     Chest pain     Unstable angina (H)     ACS (acute coronary syndrome) (H)     Bilateral carotid bruits     Heart murmur, systolic     Coronary artery disease involving native coronary artery of native heart with unstable angina pectoris (H)     Mild aortic valve stenosis     CKD stage 4 due to type 2 diabetes mellitus (H)     S/P CABG (coronary artery bypass graft)     Emotional lability     Poor appetite     Adjustment disorder with depressed mood     VINCENT (acute kidney injury) (H)     Acute on chronic renal failure (H)     Hypotension due to drugs     Past Medical History:   Diagnosis Date     CAD (coronary artery disease)      Chronic Kidney Disease, Stage 3     Created by Conversion      Diabetic Peripheral Neuropathy     Created by Conversion [facility] Annotation: Mar 16 2008  5:03PM - Brandan Ardnt: MILD      Gout     resolved     Heart murmur      [place] (hard of hearing)      Hyperlipidemia     Created by Conversion      Hypertension     Created by Conversion      Neck rigidity     fused, non surgical     Rotator cuff tendonitis      Sleep apnea     does not use his CPAP     Sleep disorder     sleep walking, can wake up angry if touched when sleeping or sleep walking     Type 2 Diabetes Mellitus     Created by Conversion          Objective:     Vitals:    11/25/19 1246 11/25/19 1400   BP: 134/75    Pulse: (!) 105 96   Resp: 16    Temp: 99  F (37.2  C)    TempSrc: Oral    SpO2: 97%    Weight: (!) 231 lb 12.8 oz (105.1 kg)    Gen - Pt in NAD  Eyes - Conjunctiva non injected, no drainage  Face - non TTP over frontal sinus areas; non TTP over maxillary areas  Ears - external canals - no induration, Right TM - not injected, Left TM -  not injected   Nose - not congested, no nasal drainage  Pharynx - not injected, tonsils 1+ size  Neck - supple, no cervical adenopathy, no masses  Cor - RRR w/o murmur  Lungs - Good air entry; no wheezes or crackles noted on auscultation - no coughing noted  Skin - no lesions, no rashes noted    Results for orders placed or performed in visit on 11/25/19   ISTAT CHEM 7 ([facility] CLIN[facility] ONLY)   Result Value Ref Range    Sodium 138 136 - 145 mmol/L    Potassium 5.2 3.5 - 5.5 mmol/L    CO2 24 22 - 31 mmol/L    Chloride 104 98 - 107 mmol/L    Anion Gap, Calculation 10 5 - 18 mmol/L    Glucose 104 70 - 125 mg/dL    BUN 50 (H) 8 - 28 mg/dL    Creatinine 3.50 (H) 0.80 - 1.50 mg/dL   Lab result discussed on day of visit.        Assessment - Plan   Medical Decision Making - No clinical findings indicative of bacterial infection requiring antibiotics, such as pneumonia, sinusitis or otitis media were ascertained from today's evaluation.  Patient said that he felt a little better after his albuterol neb and therefore albuterol solution was prescribed.  Patient's wife says they already have an albuterol nebulizer at home.  Presentation and clinical findings are consistent with a viral process.  Discussed with patient that his blood sugars were reassuring.  Also reviewed his chronic kidney disease stating that the kidney function was a little worse at this time.  Patient already has a follow-up appointment scheduled with his nephrologist.  Symptomatic management and when to follow up discussed as described in Patient Instructions.     1. Acute viral bronchitis  - albuterol nebulizer solution 2.5 mg (PROVENTIL)  - albuterol (PROVENTIL) 2.5 mg /3 mL (0.083 %) nebulizer solution; Take 3 mL (2.5 mg total) by nebulization every 4 (four) hours as needed for wheezing or shortness of breath (cough).  Dispense: 30 vial; Refill: 0    2. Acute viral syndrome    3. Type 2 diabetes mellitus with stage 4 chronic kidney disease, with long-term  current use of insulin (H)  - ISTAT CHEM 7 ([facility] CLINIC ONLY)    At the conclusion of the encounter, assessment and plan were discussed.   All questions were answered.   The patient or guardian acknowledged understanding and was involved in the decision making regarding the overall care plan.    There are no Patient Instructions on file for this visit.

## 2021-06-03 NOTE — PROGRESS NOTES
Assessment:     1. Acute bronchitis, unspecified organism  benzonatate (TESSALON) 200 MG capsule          Plan:     Patient with symptoms suggestive of acute bronchitis.  I suspect this is likely viral.  He is otherwise feeling okay other than the cough.  Prescription given for Tessalon Perles to help him symptomatically for the cough.  No antibiotics are indicated at this time.  Recommend following up if symptoms are getting worse or not improving.      Subjective:       69 y.o. male presents for evaluation of nasal congestion and cough for the past 10 days or so.  He did use some albuterol nebs 4 days ago which helped somewhat.  He initially had more chest congestion that he does but this is since gotten better.  He is having some nasal congestion as well as some pressure off and on but no pain.  He has not had any fevers.  Overall he feels slightly better but the cough is nagging.  Denies any ear pain or sore throat and is not having any wheezing or shortness of breath.  Denies any abdominal pain or headaches.  No skin rashes.    Patient Active Problem List   Diagnosis     Diabetic polyneuropathy associated with type 2 diabetes mellitus (H)     Diarrhea     Pneumonia     Hearing Loss     Right Rotator Cuff Tendonitis     Strained Right Biceps Tendon     Type 2 diabetes mellitus (H)     Hyperlipidemia     Gout     Anemia     Hypertension     Chronic Kidney Disease, Stage 3     Open Fracture Of The Distal Phalanx Of The Left Second Finger     Open Wound Of The Left Index Finger     Tenosynovitis Of The Left Hand/Wrist     Fatigue     Polyarthralgia     Cervicalgia     Low back pain without sciatica     Chest pain     Unstable angina (H)     ACS (acute coronary syndrome) (H)     Bilateral carotid bruits     Heart murmur, systolic     Coronary artery disease involving native coronary artery of native heart with unstable angina pectoris (H)     Mild aortic valve stenosis     CKD stage 4 due to type 2 diabetes mellitus  (H)     S/P CABG (coronary artery bypass graft)     Emotional lability     Poor appetite     Adjustment disorder with depressed mood     VINCENT (acute kidney injury) (H)     Acute on chronic renal failure (H)     Hypotension due to drugs       Past Medical History:   Diagnosis Date     CAD (coronary artery disease)      Chronic Kidney Disease, Stage 3     Created by Conversion      Diabetic Peripheral Neuropathy     Created by Conversion [facility] Annotation: Mar 16 2008  5:03PM - Brandan Arndt: MILD      Gout     resolved     Heart murmur      [place] (hard of hearing)      Hyperlipidemia     Created by Conversion      Hypertension     Created by Conversion      Neck rigidity     fused, non surgical     Rotator cuff tendonitis      Sleep apnea     does not use his CPAP     Sleep disorder     sleep walking, can wake up angry if touched when sleeping or sleep walking     Type 2 Diabetes Mellitus     Created by Conversion        Past Surgical History:   Procedure Laterality Date     CORONARY ARTERY BYPASS GRAFT N/A 5/17/2018    Procedure: CORONARY ARTERY BYPASS x 4, LEFT LEG ENDOSCOPIC VEIN HARVEST, LEFT INTERNAL MAMMARY ARTERY,EPI-AORTIC ULTRASOUND,  ANESTHESIA TRANSESOPHAGEAL ECHOCARDIOGRAM;  Surgeon: Meredith Peters MD;  Location: [facility] OR;  Service:      CV CORONARY ANGIOGRAM N/A 5/9/2018    Procedure: Coronary Angiogram;  Surgeon: Inderjit Bonilla MD;  Location: [facility] Lab;  Service:      HERNIA REPAIR      umbilical     KNEE ARTHROSCOPY Right      AZ LAP,CHOLECYSTECTOMY      Description: Cholecystectomy Laparoscopic;  Recorded: 03/16/2008;       Current Outpatient Medications on File Prior to Visit   Medication Sig Dispense Refill     albuterol (PROVENTIL) 2.5 mg /3 mL (0.083 %) nebulizer solution Take 3 mL (2.5 mg total) by nebulization every 4 (four) hours as needed for wheezing or shortness of breath (cough). 30 vial 0     allopurinol (ZYLOPRIM) 100 MG tablet Take 1 tablet (100 mg total) by  "mouth daily. 90 tablet 3     aspirin 81 MG EC tablet Take 81 mg by mouth every evening.        atorvastatin (LIPITOR) 20 MG tablet Take 1 tablet (20 mg total) by mouth daily. 90 tablet 3     cyanocobalamin 1000 MCG tablet Take 1,000 mcg by mouth daily.       diltiazem (CARDIZEM CD) 300 MG 24 hr capsule Take 1 capsule (300 mg total) by mouth daily. 90 capsule 3     fenofibrate (TRICOR) 48 MG tablet Take 1 tablet (48 mg total) by mouth daily. 90 tablet 3     furosemide (LASIX) 40 MG tablet Take 1 tablet (40 mg total) by mouth daily. 90 tablet 3     glipiZIDE (GLUCOTROL) 5 MG tablet Take 0.5 tablets (2.5 mg total) by mouth 2 (two) times a day before meals. 1/2 Hour BEFORE meals 90 tablet 3     insulin glargine (LANTUS SOLOSTAR U-100 INSULIN) 100 unit/mL (3 mL) pen INJECT 40 UNITS UNDER THE SKIN DAILY 15 adj dose pen 5     isosorbide mononitrate (IMDUR) 30 MG 24 hr tablet Take 1 tablet (30 mg total) by mouth daily. 90 tablet 3     LANTUS U-100 INSULIN 100 unit/mL injection Inject 25 Units under the skin at bedtime. 10 mL PRN     losartan (COZAAR) 100 MG tablet Take 1 tablet (100 mg total) by mouth daily. 90 tablet 3     omega-3/dha/epa/fish oil (FISH OIL-OMEGA-3 FATTY ACIDS) 300-1,000 mg capsule Take 1 g by mouth daily.       BD ULTRA-FINE SHORT PEN NEEDLE 31 gauge x 5/16\" Ndle USE AS DIRECTED 100 each 7     blood glucose meter (GLUCOMETER) Use 1 each As Directed as needed. Dispense glucometer brand per patient's insurance at pharmacy discretion.  0     blood glucose test (ACCU-CHEK SMARTVIEW TEST STRIP) strips USE ONE STRIP WITH YOUR ACCU-CHEK JEANNIE METER TO CHECK BLOOD SUGARS 2 TIME(S) DAILY,DX CODE E11.9 300 strip 3     blood glucose test strips Use 1 each As Directed 3 (three) times a day. Dispense brand per patient's insurance at pharmacy discretion. 300 strip 3     generic lancets (FINGERSTIX LANCETS) Dispense brand per patient's insurance at pharmacy discretion. 200 each 3     nitroglycerin (NITROSTAT) 0.4 MG SL " tablet Place 1 tablet (0.4 mg total) under the tongue every 5 (five) minutes as needed for chest pain. 25 tablet 3     No current facility-administered medications on file prior to visit.        Allergies   Allergen Reactions     Blood-Group Specific Substance      Anti-E.  Allow additional time for obtaining blood for transfusion.      Amlodipine      Edema       Amoxicillin-Pot Clavulanate Hives     Indomethacin      Dyspepsia       Metoprolol Succinate Hives       Family History   Problem Relation Age of Onset     Alzheimer's disease Mother      Chronic Kidney Disease Father      Breast cancer Sister        Social History     Socioeconomic History     Marital status:      Spouse name: None     Number of children: None     Years of education: None     Highest education level: None   Occupational History     None   Social Needs     Financial resource strain: None     Food insecurity:     Worry: None     Inability: None     Transportation needs:     Medical: None     Non-medical: None   Tobacco Use     Smoking status: Never Smoker     Smokeless tobacco: Never Used   Substance and Sexual Activity     Alcohol use: Yes     Comment: rarely     Drug use: No     Sexual activity: None   Lifestyle     Physical activity:     Days per week: None     Minutes per session: None     Stress: None   Relationships     Social connections:     Talks on phone: None     Gets together: None     Attends [employer] service: None     Active member of club or organization: None     Attends meetings of clubs or organizations: None     Relationship status: None     Intimate partner violence:     Fear of current or ex partner: None     Emotionally abused: None     Physically abused: None     Forced sexual activity: None   Other Topics Concern     None   Social History Narrative     None         Review of Systems  A 12 point comprehensive review of systems was negative except as noted.      Objective:      /66 (Patient Site: Left  Arm, Patient Position: Sitting, Cuff Size: Adult Large)   Pulse 94   Temp 97.8  F (36.6  C) (Oral)   Resp 16   SpO2 99%     General Appearance:    Alert, pleasant, cooperative, no distress, appears stated age   Head:    Normocephalic, without obvious abnormality, atraumatic   Eyes:    Conjunctiva/corneas clear   Ears:    Normal TM's without erythema or bulging. Breana external ear canals, both ears   Nose:   Nares normal, septum midline, mucosa normal, no drainage    or sinus tenderness   Throat:   Lips, mucosa, and tongue normal; teeth and gums normal.  No tonsilar hypertrophy or exudate.   Neck:   Supple, symmetrical, trachea midline, no adenopathy    Lungs:     Clear to auscultation bilaterally without wheezes, rales, or rhonchi, respirations unlabored    Heart:    Regular rate and rhythm, S1 and S2 normal, no murmur, rub   or gallop       Extremities:   Extremities normal, atraumatic, no cyanosis or edema   Skin:   Skin color, texture, turgor normal, no rashes or lesions              This note has been dictated using voice recognition software. Any grammatical or context distortions are unintentional and inherent to the software

## 2021-06-03 NOTE — TELEPHONE ENCOUNTER
Needs new glucometer/strips two times a day/lancets.     Spoke with wife, he needs his own machine

## 2021-06-07 NOTE — PROGRESS NOTES
"Sachin Berry is a 69 y.o. male who is being evaluated via a billable telephone visit.      The patient has been notified of following:     \"This telephone visit will be conducted via a call between you and your physician/provider. We have found that certain health care needs can be provided without the need for a physical exam.  This service lets us provide the care you need with a short phone conversation.  If a prescription is necessary we can send it directly to your pharmacy.  If lab work is needed we can place an order for that and you can then stop by our lab to have the test done at a later time.    If during the course of the call the physician/provider feels a telephone visit is not appropriate, you will not be charged for this service.\"     Patient has given verbal consent to a Telephone visit? Yes    Sachin Berry complains of    Chief Complaint   Patient presents with     Diabetes     Test Results     Follow-up     His medical history is significant for insulin-dependent type 2 diabetes, coronary vascular disease status post coronary bypass graft surgery, stage IV chronic kidney disease with baseline creatinine of about 3.5, chronic anemia secondary to chronic kidney disease, history of gout, and has had progressive memory dysfunction likely suggestive of an underlying dementia process.    He came in to the clinic for some routine labs that were actually ordered by his nephrologist.  Upon seeing these labs we have taken the opportunity to follow-up on blood sugar and memory concerns primarily.  I did discuss with him briefly the lab test results.  I informed him that he should be contacted by his nephrologist to discuss further.  Ultimately no major concerns with any lab tests are identified.    His creatinine has remained stable in comparison.  His hemoglobin has remained stable although low.  No significant electrolyte disturbances.    His A1c measurement is 6.0.  He remains on low-dose " glipizide and Lantus insulin.  He is getting hypoglycemic and having some fluctuations at times.  In our conversation I am concerned about the glipizide.  Based on the description probably we do not need to have this medication.  I discussed the plan of discontinuing glipizide checking blood sugars more regularly including at other times of the day and reporting them to me in about 1 week so we can make a determination if we should change medication or perhaps can stick with just the single agent Lantus for managing diabetes in conjunction with dietary modification.  They agree to this and will proceed accordingly.    In terms of the memory difficulties his wife does point out some specific examples of short-term memory difficulty.  Which he points out is likely very typical for a dementia process with the most likely consideration being an Alzheimer's process.  It is noted patient's mother had Alzheimer's.  In comparison to her situation they feel that he has not quite the same and at their minds they do not feel he fits with a dementia type picture but it seems to be relatively classic as more time progresses.  There are certainly multiple other confounding medical diagnoses that have been reviewed previously including chronic kidney disease, heart disease, diabetes and sleep apnea which may be contributing factors but are unlikely to be a sole cause in any regard of what is described to me.  We discussed a process of trying to get him set up for a specialty evaluation.  Back at her last visit in October we had attempted the same but it did not occur for a multitude of reasons.  We discussed arranging for such consultation when is appropriate to do so, there is no immediate safety concern that is identified would require a more urgent intervention as he is under the care of his wife who can help manage all medical concerns.      I have reviewed and updated the patient's Past Medical History, Social History, Family  History and Medication List.    ALLERGIES  Blood-group specific substance; Amlodipine; Amoxicillin-pot clavulanate; Indomethacin; and Metoprolol succinate        Assessment/Plan:          Diagnoses and all orders for this visit:    Type 2 diabetes mellitus with stage 4 chronic kidney disease, with long-term current use of insulin (H)    S/P CABG (coronary artery bypass graft)    Chronic kidney disease, stage IV (severe) (H)    Anemia of chronic renal failure, stage 4 (severe) (H)    Memory loss          Discontinue glipizide.  Check blood sugar on average of at least twice per day including a morning blood sugar and at least one additional reading later in the day.  If there are concerns regarding blood sugar measurements let me know right away otherwise report all readings to me in about 1 week and we will decide on further course of action.    We will make arrangements for consultation for help in making a more specific diagnosis and getting prognostic information regarding the memory difficulty described above and in previous counter notes.        Phone call duration:  18 minutes    Brandan Arndt MD

## 2021-06-07 NOTE — TELEPHONE ENCOUNTER
called  Left message.    Offered a phone visit to f/up on labs and low glucose readings.    If he calls back and is interested please schedule a 40 minute phone call with Dr Arndt. Thank you

## 2021-06-07 NOTE — TELEPHONE ENCOUNTER
Patient Returning Call  Reason for call:  Message from clinic  Information relayed to patient:  Patient should set up telephone visit.  Patient has additional questions:  No  If YES, what are your questions/concerns:  n/a  Okay to leave a detailed message?: No call back needed

## 2021-06-08 NOTE — TELEPHONE ENCOUNTER
RN cannot approve Refill Request    RN can NOT refill this medication PCP messaged that patient is overdue for Office Visit. Last office visit: 10/14/2019 Brandan Arndt MD Last Physical: 4/17/2018 Last MTM visit: Visit date not found Last visit same specialty: 10/14/2019 Brandan Arndt MD.  Next visit within 3 mo: Visit date not found  Next physical within 3 mo: Visit date not found      Sandra Martins, [facility] Triage/Med Refill 5/23/2020    Requested Prescriptions   Pending Prescriptions Disp Refills     isosorbide mononitrate (IMDUR) 60 MG 24 hr tablet [Pharmacy Med Name: ISOSORBIDE MONONIT ER 60 MG TB] 90 tablet 3     Sig: TAKE 1 TABLET BY MOUTH EVERY DAY       Isosorbide Refill Protocol Failed - 5/20/2020  5:31 PM        Failed - Visit with PCP or prescribing provider visit in last 6 months or next 3 months     Last office visit with prescriber/PCP: Visit date not found OR same dept: 10/14/2019 Brandan Arndt MD OR same specialty: 10/14/2019 Brandan Arndt MD Last physical: Visit date not found Last MTM visit: Visit date not found     Next appt within 3 mo: Visit date not found  Next physical within 3 mo: Visit date not found  Prescriber OR PCP: Brandan Arndt MD  Last diagnosis associated with med order: There are no diagnoses linked to this encounter.  If protocol passes may refill for 6 months if within 3 months of last provider visit (or a total of 9 months).              Passed - Blood pressure filed in past 12 months     BP Readings from Last 1 Encounters:   11/29/19 115/66

## 2021-06-10 NOTE — TELEPHONE ENCOUNTER
Needs an order for 3 times a day strips to be sent to [facility] due to Medicare billing per [facility] pharmacist. Called and talked to Allie at [facility] and she will try to get a new prescription sent in today.

## 2021-06-10 NOTE — TELEPHONE ENCOUNTER
Dr Chaudhari- It looks like you saw pt today? Could you please send new Rx for testing 3 times daily?

## 2021-06-10 NOTE — PROGRESS NOTES
ASSESSMENT & PLAN:  1. Type 2 diabetes mellitus (H)  He has been without any diabetic medications since he took his wife's accidentally on 7/28.  His blood sugars today have been 95 and 109.  Recommend he continue to hold his Lantus until blood sugar is up to 200, then restart at a lower dose of 10 units and closely continue to monitor glucose four times a day, and we can increase lantus as needed from there.  I will have him continue to hold his glipizide however.  They have not been checking home blood sugars for the last month and a half and wife reports he is very physically active in the summer compared to the rest of the year, and I wonder if he could do without the glipizide over the summer.  - blood glucose test (ACCU-CHEK SMARTVIEW TEST STRIP) strips; USE ONE STRIP WITH YOUR ACCU-CHEK JEANNIE METER TO CHECK BLOOD SUGARS 3 TIME(S) DAILY,DX CODE E11.9  Dispense: 300 strip; Refill: 3    2. HTN  Slightly elevated in clinic today but I would like him to get a home blood pressure monitor and check regularly at home.  If it continues to be elevated, they can reach out to the his nephrologist or back here regarding medication adjustment.    3. Chronic kidney disease, stage 4  Per Dr. Gómez's last note last fall, baseline creatinine ranges from 2.9-3.2, but wife reports it was perhaps 3.5 in the hospital.  Recheck BMP today.  - Basic Metabolic Panel    4. Coronary artery disease involving native heart without angina pectoris, unspecified vessel or lesion type  New referral placed so that patient get scheduled as he is overdue by wife's report  - Ambulatory referral to Cardiology      All medications were reviewed and reconciled.    Patient Instructions   STOP glipizide.     Continue to hold lantus for now. Monitor blood sugars four times daily. Continue to hold your lantus until blood sugars gets up to 200, then you can start your lantus at a lower dose of 10 units. Continue to check your blood sugar four times daily  and if it remains high despite the lower dose of lantus, we will increase the dose. If you get high blood sugars over the weekend despite the lantus (>200), please call RN triage.       Get a BP cuff and chcek daily. If it continues to run >135/90 let me or your nephrologist know and we will adjust medications.       Orders Placed This Encounter   Procedures     Basic Metabolic Panel     Ambulatory referral to Cardiology     Referral Priority:   Routine     Referral Type:   Consultation     Referral Reason:   Evaluation and Treatment     Requested Specialty:   Cardiology     Number of Visits Requested:   1     Medications Discontinued During This Encounter   Medication Reason     blood glucose test (ACCU-CHEK SMARTVIEW TEST STRIP) strips Reorder     blood glucose test strips Reorder     blood glucose test (ACCU-CHEK SMARTVIEW TEST STRIP) strips Reorder       No follow-ups on file.    CHIEF COMPLAINT:  Chief Complaint   Patient presents with     Hospital Visit Follow Up     Pt was seen at [facility] in [place] from 7/28 - 7/29, Pt accidentally took Spouses medication and BS dropped to high 50's       HISTORY OF PRESENT ILLNESS:  Sachin is a 70 y.o. male presenting to the clinic today for hospital follow-up.  Was hospitalized at [facility] in [facility] from 7/28 through 7/29 after accidentally taking his spouse's medications on 7/28.  He took her lisinopril, metformin, and extended release glipizide.  He was monitored overnight in the hospital with IV fluids containing dextrose.  Information is provided today by patient's wife, as I do not have a discharge summary, only patient discharge paperwork.  Patient has chronic kidney disease with a creatinine typically around 3.5, follows with Dr. Gómez in nephrology.  Wife reports that the hospital physician had spoken with Dr. Gómez about management.  Wife reports that during hospitalization, the patient's blood pressure was running  approximately 150s systolic but diastolic was running fairly low, possibly in the 50s.  Patient himself has type 2 diabetes and is typically on Lantus 25 units at night and glipizide 2.5 mg tablet twice daily.  Wife reports they lost his glucometer about a month and a half ago and had not been checking blood sugars.  She recently found it again and they are able to check.  Since hospital discharge, his blood sugar has been checked at least a couple of times daily but not 4 times daily and running from 95-1 14.  During hospitalization and since hospital discharge, he has not received any of his diabetic medications.  Today, at 5:30 AM blood sugar was 95 and at 8:30 AM it was 109.  He is trying to push fluids and appetite has been down for quite some time now but they are trying to make sure he gets regular meals and snacks.    Patient's wife reports he follows with Dr. Wong for coronary artery disease status post bypass but is overdue for follow-up in cardiology.    REVIEW OF SYSTEMS:   All other systems are negative.    PFSH:  Patient Active Problem List   Diagnosis     Diabetic polyneuropathy associated with type 2 diabetes mellitus (H)     Diarrhea     Pneumonia     Hearing Loss     Right Rotator Cuff Tendonitis     Strained Right Biceps Tendon     Type 2 diabetes mellitus (H)     Hyperlipidemia     Gout     Anemia     Hypertension     Chronic Kidney Disease, Stage 3     Open Fracture Of The Distal Phalanx Of The Left Second Finger     Open Wound Of The Left Index Finger     Tenosynovitis Of The Left Hand/Wrist     Fatigue     Polyarthralgia     Cervicalgia     Low back pain without sciatica     Chest pain     Unstable angina (H)     ACS (acute coronary syndrome) (H)     Bilateral carotid bruits     Heart murmur, systolic     Coronary artery disease involving native coronary artery of native heart with unstable angina pectoris (H)     Mild aortic valve stenosis     CKD stage 4 due to type 2 diabetes mellitus (H)      S/P CABG (coronary artery bypass graft)     Emotional lability     Poor appetite     Adjustment disorder with depressed mood     VINCENT (acute kidney injury) (H)     Acute on chronic renal failure (H)     Hypotension due to drugs     Anemia in chronic kidney disease        TOBACCO USE:  Social History     Tobacco Use   Smoking Status Never Smoker   Smokeless Tobacco Never Used       VITALS:  Vitals:    07/31/20 1335   BP: 153/75   Patient Site: Left Arm   Patient Position: Sitting   Cuff Size: Adult Large   Pulse: 87   Resp: 16   SpO2: 99%   Weight: (!) 230 lb 9.6 oz (104.6 kg)     Wt Readings from Last 3 Encounters:   07/31/20 (!) 230 lb 9.6 oz (104.6 kg)   11/25/19 (!) 231 lb 12.8 oz (105.1 kg)   10/14/19 (!) 236 lb (107 kg)     Body mass index is 29.61 kg/m .    PHYSICAL EXAM:  GENERAL: Pleasant, well-appearing patient in no acute distress.  HEENT: Pupils equal round reactive to light. Sclerae and conjunctivae clear. Oropharynx is clear with moist mucous membranes.  NECK: Supple without lymphadenopathy, no carotid bruits  CARDIOVASCULAR: Heart regular rate and rhythm without murmur normal S1-S2  LUNGS: Clear to auscultation bilaterally, good air movement throughout  EXTREMITIES Warm and well-perfused without edema. Pedal pulses palpable and symmetric bilaterally  NEURO: Alert and oriented. Grossly nonfocal. Repeats same questions.   PSYCHIATRIC: Presents on time and well groomed. Normal speech and thought content. Full affect. No abnormal movements or behaviors noted.    MEDICATIONS:  Current Outpatient Medications   Medication Sig Dispense Refill     albuterol (PROVENTIL) 2.5 mg /3 mL (0.083 %) nebulizer solution Take 3 mL (2.5 mg total) by nebulization every 4 (four) hours as needed for wheezing or shortness of breath (cough). 30 vial 0     allopurinol (ZYLOPRIM) 100 MG tablet Take 1 tablet (100 mg total) by mouth daily. 90 tablet 3     aspirin 81 MG EC tablet Take 81 mg by mouth every evening.         "atorvastatin (LIPITOR) 20 MG tablet Take 1 tablet (20 mg total) by mouth daily. 90 tablet 3     BD ULTRA-FINE SHORT PEN NEEDLE 31 gauge x 5/16\" Ndle USE AS DIRECTED 100 each 7     benzonatate (TESSALON) 200 MG capsule Take 1 capsule (200 mg total) by mouth 3 (three) times a day as needed for cough. 30 capsule 0     blood glucose meter (GLUCOMETER) Use 1 each As Directed as needed. Dispense glucometer brand per patient's insurance at pharmacy discretion.  0     blood glucose test strips Use 1 each As Directed 4 (four) times a day. Dispense brand per patient's insurance at pharmacy discretion. 400 strip 3     cyanocobalamin 1000 MCG tablet Take 1,000 mcg by mouth daily.       diltiazem (CARDIZEM CD) 300 MG 24 hr capsule Take 1 capsule (300 mg total) by mouth daily. 90 capsule 3     fenofibrate (TRICOR) 48 MG tablet Take 1 tablet (48 mg total) by mouth daily. 90 tablet 3     furosemide (LASIX) 40 MG tablet Take 1 tablet (40 mg total) by mouth daily. 90 tablet 3     generic lancets (FINGERSTIX LANCETS) Dispense brand per patient's insurance at pharmacy discretion. 200 each 3     insulin glargine (LANTUS SOLOSTAR U-100 INSULIN) 100 unit/mL (3 mL) pen INJECT 40 UNITS UNDER THE SKIN DAILY 15 adj dose pen 5     isosorbide mononitrate (IMDUR) 30 MG 24 hr tablet Take 1 tablet (30 mg total) by mouth daily. 90 tablet 3     isosorbide mononitrate (IMDUR) 60 MG 24 hr tablet TAKE 1 TABLET BY MOUTH EVERY DAY 90 tablet 3     losartan (COZAAR) 100 MG tablet Take 1 tablet (100 mg total) by mouth daily. 90 tablet 3     nitroglycerin (NITROSTAT) 0.4 MG SL tablet Place 1 tablet (0.4 mg total) under the tongue every 5 (five) minutes as needed for chest pain. 25 tablet 3     omega-3/dha/epa/fish oil (FISH OIL-OMEGA-3 FATTY ACIDS) 300-1,000 mg capsule Take 1 g by mouth daily.       blood glucose test (ACCU-CHEK SMARTVIEW TEST STRIP) strips USE ONE STRIP WITH YOUR ACCU-CHEK JEANNIE METER TO CHECK BLOOD SUGARS 3 TIME(S) DAILY,DX CODE E11.9 300 " strip 3     glipiZIDE (GLUCOTROL) 5 MG tablet Take 0.5 tablets (2.5 mg total) by mouth 2 (two) times a day before meals. 1/2 Hour BEFORE meals 90 tablet 3     LANTUS U-100 INSULIN 100 unit/mL injection Inject 25 Units under the skin at bedtime. 10 mL PRN     No current facility-administered medications for this visit.

## 2021-06-10 NOTE — TELEPHONE ENCOUNTER
Can Triage call back?   There wasn't very much information obtained at the time of the call.     It would be helpful to know what medications he accidentally took with dosage information etc.

## 2021-06-10 NOTE — TELEPHONE ENCOUNTER
Wife calling.  Fernando  Is diabetic.  Take half glipizide and accidentally took his wife metformin and others    Call poison control     She would like pcp team to call her after 11 about the medications he has taken.    Mary Estrada RN [facility] Triage Nurse Advisor      Reason for Disposition    All OTHER POTENTIALLY HARMFUL SUBSTANCES (e.g., nearly all chemicals, plants, more than a double dose of a drug, took someone else's medicine)    Additional Information    Negative: SEVERE difficulty breathing (e.g., struggling for each breath, speaks in single words)    Negative: Bluish (or gray) lips or face    Negative: Seizure    Negative: Difficult to awaken or acting confused (e.g., disoriented, slurred speech)    Negative: Shock suspected (e.g., cold/pale/clammy skin, too weak to stand, low BP, rapid pulse)    Negative: Intentional overdose and suicidal thoughts or ideas    Negative: Suicide attempt, known or suspected    Negative: Sounds like a life-threatening emergency to the triager    Negative: Chemical in eye    Negative: HARMFUL SUBSTANCE or ACID or ALKALI ingestion (e.g., toilet , drain , lye, Clinitest tablets, ammonia, bleaches) AND any symptoms (e.g., mouth pain, sore throat, breathing difficulty)    Negative: PETROLEUM PRODUCT ingestion (e.g., kerosene, gasoline, benzene, furniture polish, lighter fluid) AND any symptoms (e.g., breathing difficulty, coughing, vomiting)    Negative: Poison Center advised caller to go to ED and caller seeking second opinion    Negative: Patient sounds very sick or weak to the triager    Protocols used: POISONING-A-[place]

## 2021-06-10 NOTE — TELEPHONE ENCOUNTER
COVID 19 Nurse Triage Plan/Patient Instructions    Please be aware that novel coronavirus (COVID-19) may be circulating in the community. If you develop symptoms such as fever, cough, or SOB or if you have concerns about the presence of another infection including coronavirus (COVID-19), please contact your health care provider or visit [email or web address].     Disposition/Instructions    In-Person Visit with provider recommended. Reference Visit Selection Guide.    Thank you for taking steps to prevent the spread of this virus.  o Limit your contact with others.  o Wear a simple mask to cover your cough.  o Wash your hands well and often.    Resources    [facility]: About COVID-19: [email or web address]    CDC: What to Do If You're Sick: www.cdc.gov/coronavirus/2019-ncov/about/steps-when-sick.html    CDC: Ending Home Isolation: www.cdc.gov/coronavirus/2019-ncov/hcp/disposition-in-home-patients.html     CDC: Caring for Someone: www.cdc.gov/coronavirus/2019-ncov/if-you-are-sick/care-for-someone.html     [facility]: Interim Guidance for Hospital Discharge to Home: www.health.[facility].mn.us/diseases/coronavirus/hcp/hospdischarge.pdf    [facility] clinical trials (COVID-19 research studies): [email or web address]-clinical-trials     Below are the COVID-19 hotlines at the [place] Department of Health ([facility]). Interpreters are available.   o For health questions: Call 775-584-6876 or 1-542.334.8560 (7 a.m. to 7 p.m.)  o For questions about schools and childcare: Call 458-076-0652 or 1-685.889.7744 (7 a.m. to 7 p.m.)         RN triage   Call from pt wife -- signed consent in chart --   Pt was hospitalized in [place]-- and d/c home on Wed 7/29 --   Pt had taken wife's medications and had low blood sugars -- needed IV --  At discharge -- told not to restart medications until sees PCP   Wife wants appt today   Blood sugars now    Pt is drinking fair -- eating a little - U.O. OK   Pt also has anemia  per wife and ' heart rate is off ' -- arrythmias-- and low BP   Does not monitor blood pressure or heart at home   Wife also wants pt to see heart and kidney doctor   Pt is alert and oriented -- has memory issues and is at baseline per wife .  Transferred to    Esther Dominguez RN [facility] RN triage        Reason for Disposition    Patient wants to be seen    Additional Information    Negative: Shock suspected (e.g., cold/pale/clammy skin, too weak to stand, low BP, rapid pulse)    Negative: Difficult to awaken or acting confused (e.g., disoriented, slurred speech)    Negative: Sounds like a life-threatening emergency to the triager    Negative: Drinking very little and dehydration suspected (e.g., no urine > 12 hours, very dry mouth, very lightheaded)    Negative: Fever > 104 F (40 C)    Protocols used: RECENT MEDICAL VISIT FOR ILLNESS FOLLOW-UP CALL-A-[place]

## 2021-06-10 NOTE — TELEPHONE ENCOUNTER
ROSANNE Alston I spoke with patients wife who just got off the phone with poison control.     Poison control advised to bring patient to the ER ASAP for evaluation and possible admission.   Patients wife is bringing him to the ER now.

## 2021-06-10 NOTE — TELEPHONE ENCOUNTER
Forms Request  Name of form/paperwork: DMV  Have you been seen for this request: No  Do we have the form: The patient's wife is calling asking if the clinic received the form? The patient's wife states the patient's driving privileges are going to be cancelled if the form doesn't get back to the DMV.   When is form needed by: 6/30/20  How would you like the form returned: Fax:  On the form  Patient Notified form requests are processed in 3-5 business days: Yes    Okay to leave a detailed message? Yes

## 2021-06-10 NOTE — TELEPHONE ENCOUNTER
Drug Change Request  Who is calling?:  Patient's wife, Pete  What is the current medication?:    Disp  Refills  Start  End      blood glucose test strips  400 strip  3  7/31/2020      Sig - Route: Use 1 each As Directed 4 (four) times a day. Dispense brand per patient's insurance at pharmacy discretion. - Miscellaneous     Sent to pharmacy as: blood sugar diagnostic strips     Notes to Pharmacy: Diagnosis code: E11.22, N18.4, Z79.4     E-Prescribing Status: Receipt confirmed by pharmacy (7/31/2020  2:00 PM CDT)         What alternative is being requested?: test blood sugars 3 times a day  Why the request to change?:  Caller stated [facility] told her that the patient's insurance will not cover testing for 4 times a day and will cover the maximum of 3 times a day.     Caller stated she picked up the first wrong Rx of testing twice a day. Caller stated because she picked up the twice a day Rx that was sent at 1:40 PM today, the pharmacy must get an Rx today with the 3 times a day testing sig otherwise patient cannot get test strips for the next 3 months.  Requested Pharmacy?: [facility]  Okay to leave a detailed message?:  No

## 2021-06-10 NOTE — PATIENT INSTRUCTIONS - HE
STOP glipizide.     Continue to hold lantus for now. Monitor blood sugars four times daily. Continue to hold your lantus until blood sugars gets up to 200, then you can start your lantus at a lower dose of 10 units. Continue to check your blood sugar four times daily and if it remains high despite the lower dose of lantus, we will increase the dose. If you get high blood sugars over the weekend despite the lantus (>200), please call RN triage.       Get a BP cuff and chcek daily. If it continues to run >135/90 let me or your nephrologist know and we will adjust medications.

## 2021-06-11 NOTE — TELEPHONE ENCOUNTER
Wellness Screening Tool  Symptom Screening:  Do you have one of the following NEW symptoms:    Fever (subjective or >100.0)?  No    A new cough?  No    Shortness of breath?  No     Chills? No     New loss of taste or smell? No     Generalized body aches? No     New persistent headache? No     New sore throat? No     Nausea, vomiting, or diarrhea?  No    Within the past 2 weeks, have you been exposed to someone with a known positive illness below:    COVID-19 (known or suspected)?  No    Chicken pox?  No    Mealses?  No    Pertussis?  No    Patient notified of visitor policy- They may have one person accompany them to their appointment, but they will need to wear a mask and will be screened upon arrival for symptoms: Yes  Pt informed to wear a mask: Yes  Pt notified if they develop any symptoms listed above, prior to their appointment, they are to call the clinic directly at [phone] for further instructions.  Yes  Patient's appointment status: Patient will be seen in clinic as scheduled on Tues 9-15-20 @ 0750 in  clinic with Dr. Wong.  mg

## 2021-06-11 NOTE — TELEPHONE ENCOUNTER
Left message for patient requesting call back to complete Wellness screen prior to clinic appt tomorrow.  mg

## 2021-06-11 NOTE — PATIENT INSTRUCTIONS - HE
1. Continue current medications  2. Plan echocardiogram in 2 months  3. Tentative follow up in 2 year  4. Check lipids today

## 2021-06-11 NOTE — PROGRESS NOTES
Patient ID: Sachin Berry is a 67 y.o. male.  /60  Pulse 64  Wt (!) 254 lb (115.2 kg)  BMI 33.51 kg/m2    Assessment/Plan:                   Diagnoses and all orders for this visit:    Type 2 diabetes mellitus  -     Glycosylated Hemoglobin A1c  -     Comprehensive Metabolic Panel    Hyperlipidemia, unspecified hyperlipidemia type  -     Comprehensive Metabolic Panel    Essential hypertension with goal blood pressure less than 140/90  -     Comprehensive Metabolic Panel    Anemia  -     HM2(CBC w/o Differential)    Chronic Kidney Disease, Stage 3    Arthritis  -     C-Reactive Protein(CRP)  -     Sedimentation Rate  -     Ambulatory referral to Rheumatology    Cervical spine ankylosis  -     C-Reactive Protein(CRP)  -     Sedimentation Rate  -     Ambulatory referral to Rheumatology    RA (rheumatoid arthritis)  -     C-Reactive Protein(CRP)  -     Sedimentation Rate  -     Ambulatory referral to Rheumatology    Vitamin D deficiency  -     Vitamin D, Total (25-Hydroxy)    Temperature intolerance  -     Thyroid Cascade    Other orders  -     sildenafil (REVATIO) 20 mg tablet; Take 1-3 tablets (20-60 mg total) by mouth daily.  Dispense: 30 tablet; Refill: 6        DISCUSSION  Obtain labs referral to rheumatology.  Continue current medication therapy.  Subjective:     HPI    Sachin Berry is a 67-year-old man whose medical history includes diabetes, chronic kidney disease, hypertension, hyperlipidemia, anemia, vitamin D deficiency, distant history of rheumatoid arthritis, incidental notation of cervical spine ankylosis on x-ray.  Is here today to discuss multiple concerns.  Reports diabetic numbers are generally favorable.  No additional concerns for hypoglycemia.  This past fall we reduced medication dosages.  He is due for eye exam.  Reports stable neuropathy symptoms.  No symptoms of vascular disease, denies chest pain shortness of breath.  Continues to take blood pressure and cholesterol  "lowering medication.  Did have an MRI scan done this past fall that showed an area of atherosclerosis in the cerebral vasculature it was described as mild.  Discussed continued risk factor reduction and close monitoring.  No indication for any further intervention.  Discussed obtaining additional labs.  Brings up temperature intolerance.    He does have a history of rheumatoid arthritis.  Was hospitalized and treated for a time back in the 1970s.  Did have incidental noted cervical ankylosis and an x-ray within the past year.  Reports no significant neck issues currently.  Does report multiple site arthritis symptoms.  Would like to visit with a rheumatologist which I think is a good idea.    Review of Systems  Complete review of systems is obtained.  Other than the specific considerations noted above complete review of systems is negative.         Objective:   Medications:  Current Outpatient Prescriptions   Medication Sig Note     albuterol (PROVENTIL HFA;VENTOLIN HFA) 90 mcg/actuation inhaler Inhale 2 puffs every 6 (six) hours as needed for wheezing or shortness of breath (COUGH).      allopurinol (ZYLOPRIM) 100 MG tablet TAKE 1 TABLET (100 MG TOTAL) BY MOUTH DAILY.      aspirin 81 MG EC tablet Take 81 mg by mouth daily.      BD INSULIN PEN NEEDLE UF SHORT 31 gauge x 5/16\" Ndle USE AS DIRECTED      blood glucose test (ACCU-CHEK JAMSHID PLUS TEST STRP) strips Test Blood sugar Twice daily.  Dispense brand per patient's insurance at pharmacy discretion.      cloNIDine HCl (CATAPRES) 0.1 MG tablet TAKE 1 TABLET BY MOUTH TWICE A DAY      diltiazem (CARDIZEM CD) 240 MG 24 hr capsule TAKE 1 CAPSULE BY MOUTH TWICE A DAY.      generic lancets (ACCU-CHEK FASTCLIX) Use one lancet with your Accu-Chek Leigh meter to check blood sugar as directed      glipiZIDE (GLUCOTROL) 5 MG 24 hr tablet TAKE 1 TABLET (5 MG TOTAL) BY MOUTH DAILY WITH BREAKFAST.      insulin glargine (LANTUS SOLOSTAR) 100 unit/mL (3 mL) pen Inject 40 Units " "under the skin daily.      insulin syringe-needle U-100 (BD INSULIN SYRINGE ULT-FINE II) 1 mL 31 x 5/16\" Syrg Inject daily as directed.      losartan-hydrochlorothiazide (HYZAAR) 100-25 mg per tablet TAKE 1 TABLET BY MOUTH ONCE DAILY      NEEDLES, INSULIN DISPOSABLE (BD INSULIN PEN NEEDLE UF SHORT [facility] Use As Directed. 31G X 8 MM      sildenafil (VIAGRA) 100 MG tablet Take 1 tablet (100 mg total) by mouth as needed for erectile dysfunction. Take one tablet one hour prior to intercourse.      simvastatin (ZOCOR) 40 MG tablet TAKE 1 TABLET BY MOUTH EVERY DAY      blood glucose test strips Use one strip with your Accu-Chek Leigh meter to check blood sugars 3 time(s) daily      cloNIDine HCl (CATAPRES) 0.1 MG tablet Take 1 tablet (0.1 mg total) by mouth 2 (two) times a day.      diltiazem (DILACOR XR) 240 MG 24 hr capsule Take 240 mg by mouth 2 (two) times a day.      fluticasone (FLONASE) 50 mcg/actuation nasal spray 1 spray into each nostril daily.      insulin glargine (LANTUS SOLOSTAR) 100 unit/mL (3 mL) pen INJECT 40 UNITS SUBCUTANEOUSLY ONCE A DAY AS DIRECTED.      naproxen (NAPROSYN) 500 MG tablet TK 1 T PO BID 6/13/2017: Received from: External Pharmacy     scopolamine (TRANSDERM-SCOP) 1.5 mg (1 mg over 3 days) transdermal patch Place 1 patch on the skin every third day.      sildenafil (REVATIO) 20 mg tablet Take 1-3 tablets (20-60 mg total) by mouth daily.      Allergies:  Allergies   Allergen Reactions     Amlodipine      Edema       Amoxicillin-Pot Clavulanate Hives     Indomethacin      Dyspepsia       Metoprolol Succinate Hives     Tobacco:   reports that he has quit smoking. He does not have any smokeless tobacco history on file.     Physical Exam      /60  Pulse 64  Wt (!) 254 lb (115.2 kg)  BMI 33.51 kg/m2        General Appearance:    Alert, cooperative, no distress   Eyes:    No conjunctival irritation, no scleral icterus       Lungs:     Clear to auscultation bilaterally, respirations " unlabored   Heart:    Regular rate and rhythm, S1 and S2 normal, no murmur, rub   or gallop   Abdomen:     Soft, non-tender, bowel sounds active all four quadrants,     no masses, no organomegaly   Extremities:   Extremities normal, atraumatic, no cyanosis or edema   Skin:   Skin color, texture, turgor normal, no rashes or lesions   Neurologic:   Normal strength and sensation

## 2021-06-12 NOTE — TELEPHONE ENCOUNTER
Spoke with patients wife.  She reports patient is using Lantus 16 units once daily. She states the kidney specialist thought he should use Lantus 8 units twice daily morning and evening but should have patient check with PCP first. Patient would need more pen needles if he uses Lantus twice daily instead of once daily.     The Glipizide was given just the one time.

## 2021-06-12 NOTE — TELEPHONE ENCOUNTER
Please determine the exact doses insulin being given.  Was glipizide just given the 1 time since the reccomendatio to stop?

## 2021-06-12 NOTE — TELEPHONE ENCOUNTER
Continue current insulin dosing of 16 units daily.   Please schedule appointment to follow-up on diabetes as soon as possible.  I recommend starting new medication amlodipine that was prescribed by the kidney doctor and continue to monitor blood pressure.

## 2021-06-12 NOTE — PROGRESS NOTES
"ASSESSMENT AND PLAN:  Sachin Berry 67 y.o. male is seen here on 08/08/17 for evaluation of painful neck and lower back, other joints which is more of a stiffness than pain.  This is troubled him for the past several years.  At one time he was told that he may have rheumatoid arthritis was treated for a duration of time that is he has a difficult time recollecting.  He has not had any treatment for \"rheumatoid arthritis \"for 10 years.  He then also recalls that at one point he was told that he has \"bamboo spine\".  Will check labs and x-rays in the office noted.  X-rays of the cervical spine, reviewed personally, findings: He has marked syndesmophytes, bridging, ankylosis commensurate with seronegative spondyloarthropathy such as ankylosing spondylitis.. We discussed the differential diagnosis.  Many of his symptoms are mechanical but some of the comments as noted there is a question of if there is an inflammatory component as well.  While the workup is in progress he has the option of taking tramadol at nighttime.  Given his renal impairment is not a candidate for taking nonsteroidals taken the naproxen off his list of medications.  We discussed that the same applies to over-the-counter nonsteroidals.  I have asked him to return for follow-up.  In the next 4-6 weeks.  Diagnoses and all orders for this visit:    Polyarthralgia  -     HM1(CBC and Differential)  -     Creatinine  -     ALT (SGPT)  -     Albumin  -     Rheumatoid Factor Quant  -     CCP Antibodies  -     Hepatitis C Antibody (Anti-HCV)  -     Erythrocyte Sedimentation Rate  -     C-Reactive Protein  -     Antinuclear Antibody (KENNY) Cascade  -     Hepatitis B Surface Antigen (HBsAG)  -     XR Cervical Spine 2 - 3 VWS; Future; Expected date: 8/8/17  -     HM1 (CBC with Diff)  -     traMADol (ULTRAM) 50 mg tablet; Take 1 tablet (50 mg total) by mouth every 8 (eight) hours as needed for pain.  Dispense: 90 tablet; Refill: 1  -     XR Hands Bilateral 3 " "or More VWS; Future; Expected date: 8/8/17    Cervicalgia  -     HM1(CBC and Differential)  -     Creatinine  -     ALT (SGPT)  -     Albumin  -     Rheumatoid Factor Quant  -     CCP Antibodies  -     Hepatitis C Antibody (Anti-HCV)  -     Erythrocyte Sedimentation Rate  -     C-Reactive Protein  -     Antinuclear Antibody (KENNY) Cascade  -     Hepatitis B Surface Antigen (HBsAG)  -     XR Cervical Spine 2 - 3 VWS; Future; Expected date: 8/8/17  -     HM1 (CBC with Diff)  -     traMADol (ULTRAM) 50 mg tablet; Take 1 tablet (50 mg total) by mouth every 8 (eight) hours as needed for pain.  Dispense: 90 tablet; Refill: 1    Chronic Kidney Disease, Stage 3  -     traMADol (ULTRAM) 50 mg tablet; Take 1 tablet (50 mg total) by mouth every 8 (eight) hours as needed for pain.  Dispense: 90 tablet; Refill: 1    Low back pain without sciatica  -     XR Lumbar Spine 2 or 3 VWS; Future; Expected date: 8/8/17      HISTORY OF PRESENTING ILLNESS:  Sachin NAVARRO Berry, 67 y.o., male is here for evaluation of stiffness and discomfort.  This is in his neck and lower back, hands.  The worse of all the symptoms are in the neck.  These have troubled him for the past several years this may be as long as 7 or 8.  At one point in the remote past year because he was told that he has rheumatoid arthritis.  Then he was also said to have \"bamboo spine\".  He does not remember all the details.  He thinks he was on treatment for these conditions for about a few months.  Then these were discontinued.  And he has not had any of those interventions for the past 10 years.  He reports that the first thing in the morning is the most stiff.  This can last up to an hour in the neck and lower back and less so in the hands.  And he then also noted that at one point he was told that he may have \"holes\" in his wrist bone secondary to rheumatoid arthritis at that time.  He has observed and none of the joint areas does have swelled up.  He has very minimal " discomfort in his appendicular system.  The bulk of his symptoms in the axial area.  He reports no history of psoriasis himself or in the family.  He has noted fatigue, ringing in the ears, history of nocturia, dizziness and some impairment of the memory.  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  He does not smoke alcohol rarely.  He gets a refreshing 7 hours asleep at nighttime.  He has multiple comorbidities such as diabetes, renal impairment.  Further historical information and ADL limitations as noted in the multidimensional health assessment questionnaire attached in the EMR.  He reports no family personal history of psoriasis ulcerative colitis Crohn's disease.  Rest of the 13 system ROS is negative.     ALLERGIES:Amlodipine; Amoxicillin-pot clavulanate; Indomethacin; and Metoprolol succinate    PAST MEDICAL/ACTIVE PROBLEMS/MEDICATION/ FAMILY HISTORY/SOCIAL DATA:  The patient has a family history of  Past Medical History:   Diagnosis Date     Chronic Kidney Disease, Stage 3     Created by Conversion      Diabetic Peripheral Neuropathy     Created by [employer] [facility] Annotation: Mar 16 2008  5:03PM - Brandan Arndt: MILD      Gout     Created by Conversion      Hyperlipidemia     Created by Conversion      Hypertension     Created by Conversion      Type 2 Diabetes Mellitus     Created by Conversion      History   Smoking Status     Former Smoker   Smokeless Tobacco     Not on file     Patient Active Problem List   Diagnosis     Diabetic Peripheral Neuropathy     Diarrhea     Pneumonia     Hearing Loss     Right Rotator Cuff Tendonitis     Strained Right Biceps Tendon     Type 2 Diabetes Mellitus     Hyperlipidemia     Gout     Anemia     Hypertension     Chronic Kidney Disease, Stage 3     Open Fracture Of The Distal Phalanx Of The Left Second Finger     Open Wound Of The Left Index Finger     Tenosynovitis Of The Left Hand/Wrist     Fatigue     Current Outpatient  "Prescriptions   Medication Sig Dispense Refill     albuterol (PROVENTIL HFA;VENTOLIN HFA) 90 mcg/actuation inhaler Inhale 2 puffs every 6 (six) hours as needed for wheezing or shortness of breath (COUGH). 1 Inhaler 0     allopurinol (ZYLOPRIM) 100 MG tablet TAKE 1 TABLET (100 MG TOTAL) BY MOUTH DAILY. 90 tablet 3     aspirin 81 MG EC tablet Take 81 mg by mouth daily.       BD INSULIN PEN NEEDLE UF SHORT 31 gauge x 5/16\" Ndle USE AS DIRECTED 100 each 3     blood glucose test (ACCU-CHEK JAMSHID PLUS TEST STRP) strips Test Blood sugar Twice daily.  Dispense brand per patient's insurance at pharmacy discretion. 200 strip 11     blood glucose test strips Use one strip with your Accu-Chek Leigh meter to check blood sugars 3 time(s) daily 300 strip 3     cloNIDine HCl (CATAPRES) 0.1 MG tablet Take 1 tablet (0.1 mg total) by mouth 2 (two) times a day. 180 tablet 1     cloNIDine HCl (CATAPRES) 0.1 MG tablet TAKE 1 TABLET BY MOUTH TWICE A  tablet 3     diltiazem (CARDIZEM CD) 240 MG 24 hr capsule TAKE 1 CAPSULE BY MOUTH TWICE A DAY. 180 capsule 3     diltiazem (DILACOR XR) 240 MG 24 hr capsule Take 240 mg by mouth 2 (two) times a day.       fluticasone (FLONASE) 50 mcg/actuation nasal spray 1 spray into each nostril daily. 16 g 2     generic lancets (ACCU-CHEK FASTCLIX) Use one lancet with your Accu-Chek Leigh meter to check blood sugar as directed 300 each 3     glipiZIDE (GLUCOTROL XL) 5 MG 24 hr tablet TAKE 1 TABLET (5 MG TOTAL) BY MOUTH DAILY WITH BREAKFAST. 30 tablet 6     insulin glargine (LANTUS SOLOSTAR) 100 unit/mL (3 mL) pen INJECT 40 UNITS SUBCUTANEOUSLY ONCE A DAY AS DIRECTED. 30 adj dose pen 3     insulin glargine (LANTUS SOLOSTAR) 100 unit/mL (3 mL) pen Inject 40 Units under the skin daily. 12 adj dose pen 6     insulin syringe-needle U-100 (BD INSULIN SYRINGE ULT-FINE II) 1 mL 31 x 5/16\" Syrg Inject daily as directed.       losartan-hydrochlorothiazide (HYZAAR) 100-25 mg per tablet TAKE 1 TABLET BY MOUTH " ONCE DAILY 90 tablet 2     naproxen (NAPROSYN) 500 MG tablet TK 1 T PO BID  0     NEEDLES, INSULIN DISPOSABLE (BD INSULIN PEN NEEDLE UF SHORT MISC) Use As Directed. 31G X 8 MM       scopolamine (TRANSDERM-SCOP) 1.5 mg (1 mg over 3 days) transdermal patch Place 1 patch on the skin every third day. 3 patch 0     sildenafil (REVATIO) 20 mg tablet Take 1-3 tablets (20-60 mg total) by mouth daily. 30 tablet 6     sildenafil (VIAGRA) 100 MG tablet Take 1 tablet (100 mg total) by mouth as needed for erectile dysfunction. Take one tablet one hour prior to intercourse. 3 tablet 11     simvastatin (ZOCOR) 40 MG tablet TAKE 1 TABLET BY MOUTH EVERY DAY 90 tablet 2     No current facility-administered medications for this visit.        COMPREHENSIVE EXAMINATION:  Vitals:    08/08/17 0914   BP: 162/72   Pulse: 72   Weight: (!) 252 lb (114.3 kg)     A well appearing alert oriented male. Vital data as noted above. His eyes without inflammation/scleromalacia. ENTwithout oral mucositis, thrush, nasal deformity, external ear redness, deformity. His neck is without lymphadenopathy and supple. Lungs normal sounds, no pleural rub. Heart auscultation normal rate, rhythm; no pericardial rub and murmurs. Abdomen soft, non tender, no organomegaly. Skin examined for heliotrope, malar area eruption, lupus pernio, periungual erythema, sclerodactyly, papules, erythema nodosum, purpura, nail pitting, onycholysis, and obvious psoriasis lesion. Neurological examination shows normal alertness, speech, facial symmetry, tone and power in upper and lower extremities, Tinel's and Phalen's at wrist and gait. The joint examination is performed for swelling, tenderness, warmth, erythema, and range of motion in the following joints: DIPs, PIPs, MCPs, wrists, first CMC's, elbows, shoulders, hips, knees, ankles, feet; spine for range of motion and paraspinal muscles for tenderness. The salient normal / abnormal findings are appended.  He has reduced range of  motion of the C-spine especially in extension, he has Heberden nodes, JLT of the knees he does not have definite synovitis in any of the palpable appendicular joints.  He has reduced range of motion in the lumbar spine in all directions.  He does not have dactylitis.  No enthesitis.    LAB / IMAGING DATA:  ALT   Date Value Ref Range Status   06/13/2017 17 0 - 45 U/L Final   11/28/2016 21 0 - 45 U/L Final   11/07/2016 20 0 - 45 U/L Final     Albumin   Date Value Ref Range Status   06/13/2017 3.7 3.5 - 5.0 g/dL Final   11/28/2016 3.6 3.5 - 5.0 g/dL Final   11/07/2016 3.3 (L) 3.5 - 5.0 g/dL Final     Creatinine   Date Value Ref Range Status   06/13/2017 2.28 (H) 0.70 - 1.30 mg/dL Final   11/28/2016 1.95 (H) 0.70 - 1.30 mg/dL Final   11/07/2016 1.97 (H) 0.70 - 1.30 mg/dL Final       WBC   Date Value Ref Range Status   06/13/2017 6.5 4.0 - 11.0 thou/uL Final   11/28/2016 6.8 4.0 - 11.0 thou/uL Final   06/25/2015 5.7 4.0 - 11.0 thou/uL Final   03/02/2015 4.7 4.0 - 11.0 thou/uL Final     Hemoglobin   Date Value Ref Range Status   06/13/2017 11.9 (L) 14.0 - 18.0 g/dL Final   11/28/2016 12.3 (L) 14.0 - 18.0 g/dL Final   11/07/2016 11.8 (L) 14.0 - 18.0 g/dL Final     Platelets   Date Value Ref Range Status   06/13/2017 228 140 - 440 thou/uL Final   11/28/2016 220 140 - 440 thou/uL Final   11/07/2016 248 140 - 440 thou/uL Final       Lab Results   Component Value Date    SEDRATE 28 (H) 06/13/2017

## 2021-06-12 NOTE — TELEPHONE ENCOUNTER
Reason contacted:  symptom  Information relayed:  Below message. Helped arrange an appointment with Dr. Arndt on Monday.   Additional questions:  No  Further follow-up needed:  No  Okay to leave a detailed message:  No

## 2021-06-12 NOTE — TELEPHONE ENCOUNTER
Patient is requesting more needles due to having victoza added. He would like enough needles for 2 injections per day. I pended order but amount needs changing

## 2021-06-12 NOTE — TELEPHONE ENCOUNTER
It sounds like an appointment should be arranged for this patient.   How soon should he be seen? Any changes to DM or BP medications prior to him being seen?

## 2021-06-12 NOTE — PROGRESS NOTES
Patient ID: Sachin Berry is a 70 y.o. male.  /76   Pulse 63   Wt 222 lb (100.7 kg)   SpO2 99%   BMI 30.11 kg/m      Assessment/Plan:                   Diagnoses and all orders for this visit:    Type 2 diabetes mellitus with stage 4 chronic kidney disease, with long-term current use of insulin (H)  -     Glycosylated Hemoglobin A1c  -     liraglutide (VICTOZA) 0.6 mg/0.1 mL (18 mg/3 mL) injection; Inject 0.1 mL (0.6 mg total) under the skin daily. With or without food.  Dispense: 3 mL; Refill: 3  -     insulin glargine (LANTUS SOLOSTAR U-100 INSULIN) 100 unit/mL (3 mL) pen; Inject 12 Units under the skin daily.  Dispense: 3 mL; Refill: 0    Chronic kidney disease, unspecified CKD stage  -     Comprehensive Metabolic Panel  -     HM2(CBC w/o Differential)    Anemia of chronic renal failure, stage 4 (severe) (H)  -     HM2(CBC w/o Differential)    Flu vaccine need  -     Influenza,Quad,High Dose,PF 65 YR+    Fatigue, unspecified type  -     Thyroid [place]    Memory loss        DISCUSSION  Add Victoza begin 0.6 mg.  Reduce dose of Lantus down to 12 (Previously at 16 as of today) monitor closely.  Check additional labs as noted.  Follow-up with specialty providers.  Referral for memory.  Subjective:     HPI    Sachin Berry is a 70 y.o. male medical history significant for insulin-dependent type 2 diabetes, coronary vascular disease status post bypass surgery, stage IV chronic kidney disease followed by nephrology, anemia of chronic kidney disease, progressive memory dysfunction consistent with underlying dementia process, seasonal affect of disorder by description, history of gout.    He has had recent visits with his cardiologist and nephrologist those visits are reviewed.  Noted that he was started on amlodipine recently.  He had recent cholesterol testing performed that is reviewed.    He has insulin-dependent diabetes A1c today is 6.9.  He is on Lantus and has been on glipizide.  At our last  meeting in April we discussed discontinuation of glipizide however it was continued.  This summer had concerned where he had taken his wife's medication inadvertently.  This resulted in hypoglycemia requiring hospitalization at a [facility] facility.  Was recommended that he scale back on his Lantus and discontinue the glipizide.  He had a follow-up with a colleague at a nearby clinic who gave further advice regarding diabetes management.  His wife has noted more swings in blood sugars he tends to be better usually in the morning but at times later in the day can be quite high.  She had given him half of the dose of glipizide for a blood sugar of 284 recently.  Today we discussed considerations regarding additional medication therapy to better manage diabetes.  Discussed the dangers of glipizide and I do not feel it is a medication we should use any further.  Discussed the benefits of Victoza specifically.  I think this would help with his given situation.  Discussed continued close monitoring.    With his memory concerns this has been gradually progressive.  I do feel based on my knowledge of the situation that this seems to fit most closely with an underlying dementia process.  Certainly with his complex health history there are multiple potential confounding factors including more specifically sleep apnea and probable depression that seems to have a seasonal component.  Discussed referral to specialty provider and they are willing to pursue this at this time to gain a more diagnosis and potentially more objective consideration as to the status of his cognitive abilities.    Review of Systems  Complete review of systems is obtained.  Other than the specific considerations noted above complete review of systems is negative.          Objective:   Medications:  Current Outpatient Medications   Medication Sig Note     albuterol (PROVENTIL) 2.5 mg /3 mL (0.083 %) nebulizer solution Take 3 mL (2.5 mg  "total) by nebulization every 4 (four) hours as needed for wheezing or shortness of breath (cough).      allopurinol (ZYLOPRIM) 100 MG tablet Take 1 tablet (100 mg total) by mouth daily.      amLODIPine (NORVASC) 5 MG tablet Take 10 mg by mouth.      aspirin 81 MG EC tablet Take 81 mg by mouth every evening.       atorvastatin (LIPITOR) 20 MG tablet Take 1 tablet (20 mg total) by mouth daily.      BD ULTRA-FINE SHORT PEN NEEDLE 31 gauge x 5/16\" Ndle USE AS DIRECTED      benzonatate (TESSALON) 200 MG capsule Take 1 capsule (200 mg total) by mouth 3 (three) times a day as needed for cough.      blood glucose meter (GLUCOMETER) Use 1 each As Directed as needed. Dispense glucometer brand per patient's insurance at pharmacy discretion.      blood glucose test (ACCU-CHEK SMARTVIEW TEST STRIP) strips USE ONE STRIP WITH YOUR ACCU-CHEK JEANNIE METER TO CHECK BLOOD SUGARS 3 TIME(S) DAILY,DX CODE E11.9      blood glucose test strips Use 1 each As Directed 4 (four) times a day. Dispense brand per patient's insurance at pharmacy discretion.      cyanocobalamin 1000 MCG tablet Take 1,000 mcg by mouth daily.      diltiazem (CARDIZEM CD) 300 MG 24 hr capsule Take 1 capsule (300 mg total) by mouth daily.      fenofibrate (TRICOR) 48 MG tablet Take 1 tablet (48 mg total) by mouth daily.      furosemide (LASIX) 40 MG tablet Take 1 tablet (40 mg total) by mouth daily.      generic lancets (FINGERSTIX LANCETS) Dispense brand per patient's insurance at pharmacy discretion.      insulin glargine (LANTUS SOLOSTAR U-100 INSULIN) 100 unit/mL (3 mL) pen Inject 12 Units under the skin daily.      isosorbide mononitrate (IMDUR) 30 MG 24 hr tablet Take 1 tablet (30 mg total) by mouth daily.      isosorbide mononitrate (IMDUR) 60 MG 24 hr tablet TAKE 1 TABLET BY MOUTH EVERY DAY      losartan (COZAAR) 100 MG tablet Take 1 tablet (100 mg total) by mouth daily.      nitroglycerin (NITROSTAT) 0.4 MG SL tablet Place 1 tablet (0.4 mg total) under the " tongue every 5 (five) minutes as needed for chest pain.      omega-3/dha/epa/fish oil (FISH OIL-OMEGA-3 FATTY ACIDS) 300-1,000 mg capsule Take 1 g by mouth daily.      glipiZIDE (GLUCOTROL) 5 MG tablet Take 0.5 tablets (2.5 mg total) by mouth 2 (two) times a day before meals. 1/2 Hour BEFORE meals 7/31/2020: On HOLD      liraglutide (VICTOZA) 0.6 mg/0.1 mL (18 mg/3 mL) injection Inject 0.1 mL (0.6 mg total) under the skin daily. With or without food.        Allergies:  Allergies   Allergen Reactions     Blood-Group Specific Substance      Anti-E.  Allow additional time for obtaining blood for transfusion.      Amlodipine      Edema       Amoxicillin-Pot Clavulanate Hives     Indomethacin      Dyspepsia       Metoprolol Succinate Hives       Tobacco:   reports that he has never smoked. He has never used smokeless tobacco.     Physical Exam          /76   Pulse 63   Wt 222 lb (100.7 kg)   SpO2 99%   BMI 30.11 kg/m          General Appearance:    Alert, cooperative, no distress   Eyes:   No scleral icterus or conjunctival irritation       Lungs:     Clear to auscultation bilaterally, respirations unlabored, no wheezes or crackles   Heart:    Regular rate and rhythm,  No murmur   Extremities:  No edema, no joint swelling or redness, no evidence of any injuries   Skin:  No concerning skin findings, no suspicious moles, no rashes   Neurologic:  On gross examination there is no motor or sensory deficit.  Patient walks with a normal gait

## 2021-06-12 NOTE — TELEPHONE ENCOUNTER
Refill Approved    Rx renewed per Medication Renewal Policy. Medication was last renewed on 110/31/19.    Erin Fernandez, [facility] Triage/Med Refill 11/4/2020     Requested Prescriptions   Pending Prescriptions Disp Refills     diltiazem (CARDIZEM CD) 300 MG 24 hr capsule [Pharmacy Med Name: DILTIAZEM 24H ER(CD) 300 MG CP] 90 capsule 3     Sig: TAKE 1 CAPSULE BY MOUTH EVERY DAY       Calcium-Channel Blockers Protocol Passed - 11/1/2020 10:16 AM        Passed - PCP or prescribing provider visit in past 12 months or next 3 months     Last office visit with prescriber/PCP: 10/12/2020 Brandan Arndt MD OR same dept: 10/12/2020 Brandan Arndt MD OR same specialty: 10/12/2020 Brandan Arndt MD  Last physical: 4/17/2018 Last MTM visit: Visit date not found   Next visit within 3 mo: Visit date not found  Next physical within 3 mo: Visit date not found  Prescriber OR PCP: Brandan Arndt MD  Last diagnosis associated with med order: 1. Benign essential hypertension  - diltiazem (CARDIZEM CD) 300 MG 24 hr capsule [Pharmacy Med Name: DILTIAZEM 24H ER(CD) 300 MG CP]; TAKE 1 CAPSULE BY MOUTH EVERY DAY  Dispense: 90 capsule; Refill: 3  - losartan (COZAAR) 100 MG tablet [Pharmacy Med Name: LOSARTAN POTASSIUM 100 MG TAB]; TAKE 1 TABLET BY MOUTH EVERY DAY  Dispense: 90 tablet; Refill: 3    2. Hyperlipidemia, unspecified hyperlipidemia type  - atorvastatin (LIPITOR) 20 MG tablet [Pharmacy Med Name: ATORVASTATIN 20 MG TABLET]; TAKE 1 TABLET BY MOUTH EVERY DAY  Dispense: 90 tablet; Refill: 3  - fenofibrate (TRICOR) 48 MG tablet [Pharmacy Med Name: FENOFIBRATE 48 MG TABLET]; TAKE 1 TABLET BY MOUTH EVERY DAY  Dispense: 90 tablet; Refill: 3    3. S/P CABG (coronary artery bypass graft)  - furosemide (LASIX) 40 MG tablet [Pharmacy Med Name: FUROSEMIDE 40 MG TABLET]; TAKE 1 TABLET BY MOUTH EVERY DAY  Dispense: 90 tablet; Refill: 3    4. Gout  - allopurinoL (ZYLOPRIM) 100 MG tablet [Pharmacy Med Name: ALLOPURINOL 100 MG  TABLET]; TAKE 1 TABLET BY MOUTH EVERY DAY  Dispense: 90 tablet; Refill: 3    If protocol passes may refill for 12 months if within 3 months of last provider visit (or a total of 15 months).             Passed - Blood pressure filed in past 12 months     BP Readings from Last 1 Encounters:   10/12/20 136/76                atorvastatin (LIPITOR) 20 MG tablet [Pharmacy Med Name: ATORVASTATIN 20 MG TABLET] 90 tablet 3     Sig: TAKE 1 TABLET BY MOUTH EVERY DAY       Statins Refill Protocol (Hmg CoA Reductase Inhibitors) Passed - 11/1/2020 10:16 AM        Passed - PCP or prescribing provider visit in past 12 months      Last office visit with prescriber/PCP: 10/12/2020 Brandan Arndt MD OR same dept: 10/12/2020 Brandan Arndt MD OR same specialty: 10/12/2020 Brandan Arndt MD  Last physical: 4/17/2018 Last MTM visit: Visit date not found   Next visit within 3 mo: Visit date not found  Next physical within 3 mo: Visit date not found  Prescriber OR PCP: Brandan Arndt MD  Last diagnosis associated with med order: 1. Benign essential hypertension  - diltiazem (CARDIZEM CD) 300 MG 24 hr capsule [Pharmacy Med Name: DILTIAZEM 24H ER(CD) 300 MG CP]; TAKE 1 CAPSULE BY MOUTH EVERY DAY  Dispense: 90 capsule; Refill: 3  - losartan (COZAAR) 100 MG tablet [Pharmacy Med Name: LOSARTAN POTASSIUM 100 MG TAB]; TAKE 1 TABLET BY MOUTH EVERY DAY  Dispense: 90 tablet; Refill: 3    2. Hyperlipidemia, unspecified hyperlipidemia type  - atorvastatin (LIPITOR) 20 MG tablet [Pharmacy Med Name: ATORVASTATIN 20 MG TABLET]; TAKE 1 TABLET BY MOUTH EVERY DAY  Dispense: 90 tablet; Refill: 3  - fenofibrate (TRICOR) 48 MG tablet [Pharmacy Med Name: FENOFIBRATE 48 MG TABLET]; TAKE 1 TABLET BY MOUTH EVERY DAY  Dispense: 90 tablet; Refill: 3    3. S/P CABG (coronary artery bypass graft)  - furosemide (LASIX) 40 MG tablet [Pharmacy Med Name: FUROSEMIDE 40 MG TABLET]; TAKE 1 TABLET BY MOUTH EVERY DAY  Dispense: 90 tablet; Refill: 3    4. Gout  -  allopurinoL (ZYLOPRIM) 100 MG tablet [Pharmacy Med Name: ALLOPURINOL 100 MG TABLET]; TAKE 1 TABLET BY MOUTH EVERY DAY  Dispense: 90 tablet; Refill: 3    If protocol passes may refill for 12 months if within 3 months of last provider visit (or a total of 15 months).                furosemide (LASIX) 40 MG tablet [Pharmacy Med Name: FUROSEMIDE 40 MG TABLET] 90 tablet 3     Sig: TAKE 1 TABLET BY MOUTH EVERY DAY       Diuretics/Combination Diuretics Refill Protocol  Passed - 11/1/2020 10:16 AM        Passed - Visit with PCP or prescribing provider visit in past 12 months     Last office visit with prescriber/PCP: 10/12/2020 Brandan Arndt MD OR same dept: 10/12/2020 Brandan Arndt MD OR same specialty: 10/12/2020 Brandan Arndt MD  Last physical: 4/17/2018 Last MTM visit: Visit date not found   Next visit within 3 mo: Visit date not found  Next physical within 3 mo: Visit date not found  Prescriber OR PCP: Brandan Arndt MD  Last diagnosis associated with med order: 1. Benign essential hypertension  - diltiazem (CARDIZEM CD) 300 MG 24 hr capsule [Pharmacy Med Name: DILTIAZEM 24H ER(CD) 300 MG CP]; TAKE 1 CAPSULE BY MOUTH EVERY DAY  Dispense: 90 capsule; Refill: 3  - losartan (COZAAR) 100 MG tablet [Pharmacy Med Name: LOSARTAN POTASSIUM 100 MG TAB]; TAKE 1 TABLET BY MOUTH EVERY DAY  Dispense: 90 tablet; Refill: 3    2. Hyperlipidemia, unspecified hyperlipidemia type  - atorvastatin (LIPITOR) 20 MG tablet [Pharmacy Med Name: ATORVASTATIN 20 MG TABLET]; TAKE 1 TABLET BY MOUTH EVERY DAY  Dispense: 90 tablet; Refill: 3  - fenofibrate (TRICOR) 48 MG tablet [Pharmacy Med Name: FENOFIBRATE 48 MG TABLET]; TAKE 1 TABLET BY MOUTH EVERY DAY  Dispense: 90 tablet; Refill: 3    3. S/P CABG (coronary artery bypass graft)  - furosemide (LASIX) 40 MG tablet [Pharmacy Med Name: FUROSEMIDE 40 MG TABLET]; TAKE 1 TABLET BY MOUTH EVERY DAY  Dispense: 90 tablet; Refill: 3    4. Gout  - allopurinoL (ZYLOPRIM) 100 MG tablet  [Pharmacy Med Name: ALLOPURINOL 100 MG TABLET]; TAKE 1 TABLET BY MOUTH EVERY DAY  Dispense: 90 tablet; Refill: 3    If protocol passes may refill for 12 months if within 3 months of last provider visit (or a total of 15 months).             Passed - Serum Potassium in past 12 months      Lab Results   Component Value Date    Potassium 5.2 (H) 10/12/2020             Passed - Serum Sodium in past 12 months      Lab Results   Component Value Date    Sodium 141 10/12/2020             Passed - Blood pressure on file in past 12 months     BP Readings from Last 1 Encounters:   10/12/20 136/76             Passed - Serum Creatinine in past 12 months      Creatinine   Date Value Ref Range Status   10/12/2020 3.59 (H) 0.70 - 1.30 mg/dL Final                allopurinoL (ZYLOPRIM) 100 MG tablet [Pharmacy Med Name: ALLOPURINOL 100 MG TABLET] 90 tablet 3     Sig: TAKE 1 TABLET BY MOUTH EVERY DAY       There is no refill protocol information for this order        losartan (COZAAR) 100 MG tablet [Pharmacy Med Name: LOSARTAN POTASSIUM 100 MG TAB] 90 tablet 3     Sig: TAKE 1 TABLET BY MOUTH EVERY DAY       Angiotensin Receptor Blocker Protocol Passed - 11/1/2020 10:16 AM        Passed - PCP or prescribing provider visit in past 12 months       Last office visit with prescriber/PCP: 10/12/2020 Brandan Arndt MD OR same dept: 10/12/2020 Brandan Arndt MD OR same specialty: 10/12/2020 Brandan Arndt MD  Last physical: 4/17/2018 Last MTM visit: Visit date not found   Next visit within 3 mo: Visit date not found  Next physical within 3 mo: Visit date not found  Prescriber OR PCP: Brandan Arndt MD  Last diagnosis associated with med order: 1. Benign essential hypertension  - diltiazem (CARDIZEM CD) 300 MG 24 hr capsule [Pharmacy Med Name: DILTIAZEM 24H ER(CD) 300 MG CP]; TAKE 1 CAPSULE BY MOUTH EVERY DAY  Dispense: 90 capsule; Refill: 3  - losartan (COZAAR) 100 MG tablet [Pharmacy Med Name: LOSARTAN POTASSIUM 100 MG TAB];  TAKE 1 TABLET BY MOUTH EVERY DAY  Dispense: 90 tablet; Refill: 3    2. Hyperlipidemia, unspecified hyperlipidemia type  - atorvastatin (LIPITOR) 20 MG tablet [Pharmacy Med Name: ATORVASTATIN 20 MG TABLET]; TAKE 1 TABLET BY MOUTH EVERY DAY  Dispense: 90 tablet; Refill: 3  - fenofibrate (TRICOR) 48 MG tablet [Pharmacy Med Name: FENOFIBRATE 48 MG TABLET]; TAKE 1 TABLET BY MOUTH EVERY DAY  Dispense: 90 tablet; Refill: 3    3. S/P CABG (coronary artery bypass graft)  - furosemide (LASIX) 40 MG tablet [Pharmacy Med Name: FUROSEMIDE 40 MG TABLET]; TAKE 1 TABLET BY MOUTH EVERY DAY  Dispense: 90 tablet; Refill: 3    4. Gout  - allopurinoL (ZYLOPRIM) 100 MG tablet [Pharmacy Med Name: ALLOPURINOL 100 MG TABLET]; TAKE 1 TABLET BY MOUTH EVERY DAY  Dispense: 90 tablet; Refill: 3    If protocol passes may refill for 12 months if within 3 months of last provider visit (or a total of 15 months).             Passed - Serum potassium within the past 12 months     Lab Results   Component Value Date    Potassium 5.2 (H) 10/12/2020             Passed - Blood pressure filed in past 12 months     BP Readings from Last 1 Encounters:   10/12/20 136/76             Passed - Serum creatinine within the past 12 months     Creatinine   Date Value Ref Range Status   10/12/2020 3.59 (H) 0.70 - 1.30 mg/dL Final                fenofibrate (TRICOR) 48 MG tablet [Pharmacy Med Name: FENOFIBRATE 48 MG TABLET] 90 tablet 3     Sig: TAKE 1 TABLET BY MOUTH EVERY DAY       Fenofibrate Refill Protocol Passed - 11/1/2020 10:16 AM        Passed - Renal status in last 6 months     Creatinine   Date Value Ref Range Status   10/12/2020 3.59 (H) 0.70 - 1.30 mg/dL Final             Passed - Fasting lipid cascade in last 12 months     Cholesterol   Date Value Ref Range Status   09/15/2020 133 <=199 mg/dL Final     Triglycerides   Date Value Ref Range Status   09/15/2020 99 <=149 mg/dL Final     HDL Cholesterol   Date Value Ref Range Status   09/15/2020 37 (L) >=40  mg/dL Final     LDL Calculated   Date Value Ref Range Status   09/15/2020 76 <=129 mg/dL Final     Patient Fasting > 8hrs?   Date Value Ref Range Status   09/15/2020 Yes  Final             Passed - AST or ALT in last 12 months     AST   Date Value Ref Range Status   10/12/2020 13 0 - 40 U/L Final     ALT   Date Value Ref Range Status   10/12/2020 <9 0 - 45 U/L Final               Passed - PCP or prescribing provider visit in past 12 months       Last office visit with prescriber/PCP: 10/12/2020 Brandan Arndt MD OR same dept: 10/12/2020 Brandan Arndt MD OR same specialty: 10/12/2020 Brandan Arndt MD  Last physical: 4/17/2018 Last MTM visit: Visit date not found   Next visit within 3 mo: Visit date not found  Next physical within 3 mo: Visit date not found  Prescriber OR PCP: Brandan Arndt MD  Last diagnosis associated with med order: 1. Benign essential hypertension  - diltiazem (CARDIZEM CD) 300 MG 24 hr capsule [Pharmacy Med Name: DILTIAZEM 24H ER(CD) 300 MG CP]; TAKE 1 CAPSULE BY MOUTH EVERY DAY  Dispense: 90 capsule; Refill: 3  - losartan (COZAAR) 100 MG tablet [Pharmacy Med Name: LOSARTAN POTASSIUM 100 MG TAB]; TAKE 1 TABLET BY MOUTH EVERY DAY  Dispense: 90 tablet; Refill: 3    2. Hyperlipidemia, unspecified hyperlipidemia type  - atorvastatin (LIPITOR) 20 MG tablet [Pharmacy Med Name: ATORVASTATIN 20 MG TABLET]; TAKE 1 TABLET BY MOUTH EVERY DAY  Dispense: 90 tablet; Refill: 3  - fenofibrate (TRICOR) 48 MG tablet [Pharmacy Med Name: FENOFIBRATE 48 MG TABLET]; TAKE 1 TABLET BY MOUTH EVERY DAY  Dispense: 90 tablet; Refill: 3    3. S/P CABG (coronary artery bypass graft)  - furosemide (LASIX) 40 MG tablet [Pharmacy Med Name: FUROSEMIDE 40 MG TABLET]; TAKE 1 TABLET BY MOUTH EVERY DAY  Dispense: 90 tablet; Refill: 3    4. Gout  - allopurinoL (ZYLOPRIM) 100 MG tablet [Pharmacy Med Name: ALLOPURINOL 100 MG TABLET]; TAKE 1 TABLET BY MOUTH EVERY DAY  Dispense: 90 tablet; Refill: 3    If protocol passes  may refill for 12 months if within 3 months of last provider visit (or a total of 15 months).

## 2021-06-12 NOTE — TELEPHONE ENCOUNTER
RN triage   Call from pt wife - signed consent in chart   Wife states she is pt caregiver -- pt has diabetes- kidney and heart issues and memory issues   Pt hospitalized about 6 weeks ago -- after accidentally taking wives meds --   Pts meds changed at that time == taken off glipizide and insulin changed  -- insulin increased Monday evening blood sugar = 280 -- gave 16 units insulin and  1/2 glipizide  --   then in the middle of the night blood sugar low  = 70 -- gave pt sugar   Last evening blood sugar 230 - gave insulin -- did not give glipzide   Overnight blood sugar = 100   This AM blood sugar = 90  -- pt eating and drinking well   Wife also concerned about pt BP = varies   Today = 147/79  P = 67 -- no chest pain - no diff breathing   Pt took AM meds @ 0730 today   Wife requesting advice re: managing pt blood sugars and blood pressure   Wife states that kidney doctor prescribed additional medication at their last visit -- has not picked up / started than new medication yet.  Please advise             Reason for Disposition    Caller has NON-URGENT medication or insulin pump question and triager unable to answer question    Additional Information    Negative: Unconscious or difficult to awaken    Negative: Seizure occurs    Negative: Acting confused (e.g., disoriented, slurred speech)    Negative: Very weak (can't stand)    Negative: Sounds like a life-threatening emergency to the triager    Negative: Vomiting and signs of dehydration (e.g., very dry mouth, lightheaded, dark urine, etc.)    Negative: Low blood sugar symptoms with no other adult present AND hasn't tried Care Advice    Negative: Low blood glucose (< 70 mg/dL or 3.9 mmol/L) with no other adult present AND hasn't tried Care Advice    Negative: Low blood glucose (< 70 mg/dL or 3.9 mmol/L) or symptomatic, now improved with Care Advice AND cause unknown    Protocols used: DIABETES - LOW BLOOD SUGAR-A-OH

## 2021-06-13 NOTE — TELEPHONE ENCOUNTER
RN cannot approve Refill Request    RN can NOT refill this medication PCP messaged that patient is overdue for Labs. Last office visit: 10/12/2020 Brandan Arndt MD Last Physical: 4/17/2018 Last MTM visit: Visit date not found Last visit same specialty: 10/12/2020 Brandan Arndt MD.  Next visit within 3 mo: Visit date not found  Next physical within 3 mo: Visit date not found      Sandra Martins, [facility] Triage/Med Refill 11/14/2020    Requested Prescriptions   Pending Prescriptions Disp Refills     LANTUS SOLOSTAR U-100 INSULIN 100 unit/mL (3 mL) pen [Pharmacy Med Name: LANTUS SOLOSTAR 100 UNIT/ML] 15 mL 1     Sig: INJECT 40 UNITS UNDER THE SKIN DAILY       Insulin/GLP-1 Refill Protocol Failed - 11/14/2020  9:23 AM        Failed - Microalbumin in last year     Microalbumin, Random Urine   Date Value Ref Range Status   06/25/2015 99.41 (H) 0.00 - 1.99 mg/dL Final                  Passed - Visit with PCP or prescribing provider visit in last 6 months     Last office visit with prescriber/PCP: 10/12/2020 OR same dept: 10/12/2020 Brandan Arndt MD OR same specialty: 10/12/2020 Brandan Arndt MD Last physical: Visit date not found Last MTM visit: Visit date not found     Next appt within 3 mo: Visit date not found  Next physical within 3 mo: Visit date not found  Prescriber OR PCP: Brandan Arndt MD  Last diagnosis associated with med order: 1. Type 2 diabetes mellitus with stage 4 chronic kidney disease, with long-term current use of insulin (H)  - LANTUS SOLOSTAR U-100 INSULIN 100 unit/mL (3 mL) pen [Pharmacy Med Name: LANTUS SOLOSTAR 100 UNIT/ML]; INJECT 40 UNITS UNDER THE SKIN DAILY  Dispense: 15 mL; Refill: 1    If protocol passes may refill for 6 months if within 3 months of last provider visit (or a total of 9 months).              Passed - A1C in last 6 months     Hemoglobin A1c   Date Value Ref Range Status   10/12/2020 6.9 (H) <=5.6 % Final     Comment:     Normal <5.7% Prediabete  5.7-6.4% Diabletes 6.5% or higher - adopted from ADA consensus guidelines               Passed - Blood pressure in last year     BP Readings from Last 1 Encounters:   10/12/20 136/76             Passed - Creatinine done in last year     Creatinine   Date Value Ref Range Status   10/12/2020 3.59 (H) 0.70 - 1.30 mg/dL Final

## 2021-06-14 NOTE — PROGRESS NOTES
Assessment and Plan:       Diagnoses and all orders for this visit:    Type 2 diabetes mellitus  -     Glycosylated Hemoglobin A1c  -     Comprehensive Metabolic Panel    Hyperlipidemia, unspecified hyperlipidemia type  -     Comprehensive Metabolic Panel  -     Lipid [place] FASTING    Hypertension  -     Comprehensive Metabolic Panel    Anemia  -     HM2(CBC w/o Differential)    Chronic Kidney Disease, Stage 3  -     Vitamin D, Total (25-Hydroxy)    Prostate cancer screening  -     PSA, Annual Screen (Prostatic-Specific Antigen)    Low vitamin D level  -     Vitamin D, Total (25-Hydroxy)    Low vitamin B12 level  -     Vitamin B12    Encounter for general adult medical examination with abnormal findings    Other orders  -     Influenza High Dose, Seasonal 65+ yrs    Obtain labs as above.  No changes were made to medication.  Based on laboratory test results we will decide on further course of action.  We may engage in further follow-up to have additional testing and/or consider other alternative diagnoses or referral for the memory difficulties.      The patient's current medical problems were reviewed.    The following high BMI interventions were performed this visit: encouragement to exercise, weight monitoring and prescribed dietary intake  The following health maintenance schedule was reviewed with the patient and provided in printed form in the after visit summary:   Health Maintenance   Topic Date Due     DIABETES FOOT EXAM  04/09/1960     DIABETES OPHTHALMOLOGY EXAM  [birth year]/09/1960     ADVANCE DIRECTIVES DISCUSSED WITH PATIENT  01/25/2016     DIABETES URINE MICROALBUMIN  06/25/2016     DIABETES HEMOGLOBIN A1C  05/28/2018     DIABETES FOLLOW-UP  05/28/2018     FALL RISK ASSESSMENT  06/13/2018     TD 18+ HE  05/18/2020     COLONOSCOPY  03/17/2027     PNEUMOCOCCAL POLYSACCHARIDE VACCINE AGE 65 AND OVER  Completed     INFLUENZA VACCINE RULE BASED  Completed     PNEUMOCOCCAL CONJUGATE VACCINE FOR ADULTS (PCV13 OR  PREVNAR)  Completed     ZOSTER VACCINE  Completed        Subjective:   Chief Complaint: Sachin Berry is an 67 y.o. male here for an Annual Wellness visit.   HPI: He has a complex medical history that involves insulin-dependent type 2 diabetes, chronic kidney disease, anemia, hypertension, hyperlipidemia.  He and his wife are concerned about evolving memory difficulty.  He is more forgetful of names and sometimes has trouble finding the right words.  Wife becomes somewhat tearful as we talk about this.  They are very hopeful that there would be something simple that could be found to correct his memory difficulties.  He denies any concerns such as chest pain shortness of breath dizziness lightheadedness syncope.  Reviewed blood sugars noting control is quite reasonable.  Discussed considerations regarding low vitamin B 12, low vitamin D and potential thyroid disorders that could contribute to memory difficulty.  Reviewed in depth his underlying medical concerns.  We also discussed other undiagnosed concerns such as depression and sleep apnea which may be contributing factors to symptoms that may be similar to dementia.    Review of Systems: Please see above.  The rest of the review of systems are negative for all systems.    Patient Care Team:  Brandan Arndt MD as PCP - General     Patient Active Problem List   Diagnosis     Diabetic Peripheral Neuropathy     Diarrhea     Pneumonia     Hearing Loss     Right Rotator Cuff Tendonitis     Strained Right Biceps Tendon     Type 2 Diabetes Mellitus     Hyperlipidemia     Gout     Anemia     Hypertension     Chronic Kidney Disease, Stage 3     Open Fracture Of The Distal Phalanx Of The Left Second Finger     Open Wound Of The Left Index Finger     Tenosynovitis Of The Left Hand/Wrist     Fatigue     Polyarthralgia     Cervicalgia     Low back pain without sciatica     Past Medical History:   Diagnosis Date     Chronic Kidney Disease, Stage 3     Created by  "Conversion      Diabetic Peripheral Neuropathy     Created by Conversion [facility] Annotation: Mar 16 2008  5:03PM - Brandan Arndt: MILD      Gout     Created by Conversion      Hyperlipidemia     Created by Conversion      Hypertension     Created by Conversion      Type 2 Diabetes Mellitus     Created by Conversion       Past Surgical History:   Procedure Laterality Date     MS LAP,CHOLECYSTECTOMY      Description: Cholecystectomy Laparoscopic;  Recorded: 03/16/2008;      No family history on file.   Social History     Social History     Marital status:      Spouse name: N/A     Number of children: N/A     Years of education: N/A     Occupational History     Not on file.     Social History Main Topics     Smoking status: Former Smoker     Smokeless tobacco: Not on file     Alcohol use No      Comment: rarely     Drug use: Not on file     Sexual activity: Not on file     Other Topics Concern     Not on file     Social History Narrative      Current Outpatient Prescriptions   Medication Sig Dispense Refill     albuterol (PROVENTIL HFA;VENTOLIN HFA) 90 mcg/actuation inhaler Inhale 2 puffs every 6 (six) hours as needed for wheezing or shortness of breath (COUGH). 1 Inhaler 0     allopurinol (ZYLOPRIM) 100 MG tablet TAKE 1 TABLET (100 MG TOTAL) BY MOUTH DAILY. 90 tablet 2     aspirin 81 MG EC tablet Take 81 mg by mouth daily.       BD INSULIN PEN NEEDLE UF SHORT 31 gauge x 5/16\" Ndle USE AS DIRECTED 100 each 3     blood glucose test (ACCU-CHEK JAMSHID PLUS TEST STRP) strips Test Blood sugar Twice daily.  Dispense brand per patient's insurance at pharmacy discretion. 200 strip 11     blood glucose test strips Use one strip with your Accu-Chek Leigh meter to check blood sugars 3 time(s) daily 300 strip 3     cloNIDine HCl (CATAPRES) 0.1 MG tablet Take 1 tablet (0.1 mg total) by mouth 2 (two) times a day. 180 tablet 1     cloNIDine HCl (CATAPRES) 0.1 MG tablet TAKE 1 TABLET BY MOUTH TWICE A  tablet 3     " "diltiazem (CARDIZEM CD) 240 MG 24 hr capsule TAKE 1 CAPSULE BY MOUTH TWICE A DAY. 180 capsule 3     diltiazem (DILACOR XR) 240 MG 24 hr capsule Take 240 mg by mouth 2 (two) times a day.       generic lancets (ACCU-CHEK FASTCLIX) Use one lancet with your Accu-Chek Leigh meter to check blood sugar as directed 300 each 3     glipiZIDE (GLUCOTROL XL) 5 MG 24 hr tablet TAKE 1 TABLET (5 MG TOTAL) BY MOUTH DAILY WITH BREAKFAST. 30 tablet 6     insulin glargine (LANTUS SOLOSTAR) 100 unit/mL (3 mL) pen INJECT 40 UNITS SUBCUTANEOUSLY ONCE A DAY AS DIRECTED. 30 adj dose pen 3     insulin glargine (LANTUS SOLOSTAR) 100 unit/mL (3 mL) pen Inject 40 Units under the skin daily. 12 adj dose pen 6     insulin syringe-needle U-100 (BD INSULIN SYRINGE ULT-FINE II) 1 mL 31 x 5/16\" Syrg Inject daily as directed.       losartan-hydrochlorothiazide (HYZAAR) 100-25 mg per tablet TAKE 1 TABLET BY MOUTH ONCE DAILY 90 tablet 2     NEEDLES, INSULIN DISPOSABLE (BD INSULIN PEN NEEDLE UF SHORT MISC) Use As Directed. 31G X 8 MM       scopolamine (TRANSDERM-SCOP) 1 mg over 3 days transdermal patch Place 1 patch on the skin every third day. 3 patch 0     sildenafil (REVATIO) 20 mg tablet Take 1-3 tablets (20-60 mg total) by mouth daily. 30 tablet 6     sildenafil (VIAGRA) 100 MG tablet Take 1 tablet (100 mg total) by mouth as needed for erectile dysfunction. Take one tablet one hour prior to intercourse. 3 tablet 11     simvastatin (ZOCOR) 40 MG tablet TAKE 1 TABLET BY MOUTH EVERY DAY 90 tablet 3     No current facility-administered medications for this visit.       Objective:   Vital Signs:   Visit Vitals     /64     Pulse 61     Ht 6' 1\" (1.854 m)     Wt (!) 252 lb (114.3 kg)     SpO2 98%     BMI 33.25 kg/m2        VisionScreening:  No exam data present     PHYSICAL EXAM    General Appearance:    Alert, cooperative, no distress, appears stated age   Head:    Normocephalic, without obvious abnormality, atraumatic   Eyes:    PERRL, " conjunctiva/corneas clear, EOM's intact       Ears:    Normal TM's and external ear canals, both ears   Nose:   Nares normal,    Throat:   Lips, mucosa, and tongue normal; teeth and gums normal   Neck:   Supple, symmetrical, trachea midline, no adenopathy;        thyroid:  No enlargement/tenderness/nodules   Back:     Symmetric, no curvature, ROM normal, no CVA tenderness   Lungs:     Clear to auscultation bilaterally, respirations unlabored   Chest wall:    No tenderness or deformity   Heart:    Regular rate and rhythm, S1 and S2 normal, no murmur, rub   or gallop   Abdomen:     Soft, non-tender, bowel sounds active all four quadrants,     no masses, no organomegaly   Genitalia:    Normal male without lesion, discharge or tenderness   Rectal:    Normal tone, normal prostate, no masses or tenderness;     Extremities:   Extremities normal, atraumatic, no cyanosis or edema       Skin:   Skin color, texture, turgor normal, no rashes or lesions   Lymph nodes:   Cervical, supraclavicular, and axillary nodes normal   Neurologic:   CNII-XII intact. Normal strength, sensation and reflexes       throughout         Assessment Results 11/28/2017   Activities of Daily Living No help needed   Instrumental Activities of Daily Living No help needed   Mini Cog Total Score 4   Some recent data might be hidden     A Mini-Cog score of 0-2 suggests the possibility of dementia, score of 3-5 suggests no dementia    Identified Health Risks:     He is at risk for lack of exercise and has been provided with information to increase physical activity for the benefit of his well-being.  The patient was counseled and encouraged to consider modifying their diet and eating habits. He was provided with information on recommended healthy diet options.  The patient was provided with written information regarding signs of hearing loss.  Information regarding advance directives (living [place]), including where he can download the appropriate form, was  provided to the patient via the AVS.

## 2021-06-14 NOTE — TELEPHONE ENCOUNTER
RN cannot approve Refill Request    RN can NOT refill this medication Protocol failed and NO refill given. Last office visit: 10/12/2020 Brandan Arndt MD Last Physical: 4/17/2018 Last MTM visit: Visit date not found Last visit same specialty: 10/12/2020 Brandan Arndt MD.  Next visit within 3 mo: Visit date not found  Next physical within 3 mo: Visit date not found      Pili Diaz, [facility] Triage/Med Refill 1/22/2021    Requested Prescriptions   Pending Prescriptions Disp Refills     VICTOZA 2-JOEL 0.6 mg/0.1 mL (18 mg/3 mL) injection [Pharmacy Med Name: VICTOZA 2-JOEL 18 MG/3 ML PEN]  1     Sig: INJECT 0.1 ML (0.6 MG TOTAL) UNDER THE SKIN DAILY. WITH OR WITHOUT FOOD.       Insulin/GLP-1 Refill Protocol Failed - 1/22/2021 12:48 PM        Failed - Microalbumin in last year     Microalbumin, Random Urine   Date Value Ref Range Status   06/25/2015 99.41 (H) 0.00 - 1.99 mg/dL Final                  Passed - Visit with PCP or prescribing provider visit in last 6 months     Last office visit with prescriber/PCP: 10/12/2020 OR same dept: 10/12/2020 Brandan Arndt MD OR same specialty: 10/12/2020 Brandan Arndt MD Last physical: Visit date not found Last MTM visit: Visit date not found     Next appt within 3 mo: Visit date not found  Next physical within 3 mo: Visit date not found  Prescriber OR PCP: Brandan Arndt MD  Last diagnosis associated with med order: 1. Type 2 diabetes mellitus with stage 4 chronic kidney disease, with long-term current use of insulin (H)  - VICTOZA 2-JOEL 0.6 mg/0.1 mL (18 mg/3 mL) injection [Pharmacy Med Name: VICTOZA 2-JOEL 18 MG/3 ML PEN]; INJECT 0.1 ML (0.6 MG TOTAL) UNDER THE SKIN DAILY. WITH OR WITHOUT FOOD.; Refill: 1    If protocol passes may refill for 6 months if within 3 months of last provider visit (or a total of 9 months).              Passed - A1C in last 6 months     Hemoglobin A1c   Date Value Ref Range Status   10/12/2020 6.9 (H) <=5.6 % Final      Comment:     Normal <5.7% Prediabete 5.7-6.4% Diabletes 6.5% or higher - adopted from ADA consensus guidelines               Passed - Blood pressure in last year     BP Readings from Last 1 Encounters:   10/12/20 136/76             Passed - Creatinine done in last year     Creatinine   Date Value Ref Range Status   10/12/2020 3.59 (H) 0.70 - 1.30 mg/dL Final

## 2021-06-16 PROBLEM — D63.1 ANEMIA IN CHRONIC KIDNEY DISEASE: Status: ACTIVE | Noted: 2019-10-28

## 2021-06-16 PROBLEM — N18.9 ANEMIA IN CHRONIC KIDNEY DISEASE: Status: ACTIVE | Noted: 2019-10-28

## 2021-06-16 PROBLEM — R63.0 POOR APPETITE: Status: ACTIVE | Noted: 2018-05-22

## 2021-06-16 PROBLEM — N17.9 ACUTE ON CHRONIC RENAL FAILURE (H): Status: ACTIVE | Noted: 2018-06-01

## 2021-06-16 PROBLEM — M25.50 POLYARTHRALGIA: Status: ACTIVE | Noted: 2017-08-08

## 2021-06-16 PROBLEM — R07.9 CHEST PAIN: Status: ACTIVE | Noted: 2018-05-08

## 2021-06-16 PROBLEM — Z95.1 S/P CABG (CORONARY ARTERY BYPASS GRAFT): Status: ACTIVE | Noted: 2018-05-17

## 2021-06-16 PROBLEM — M54.50 LOW BACK PAIN WITHOUT SCIATICA: Status: ACTIVE | Noted: 2017-08-08

## 2021-06-16 PROBLEM — I24.9 ACS (ACUTE CORONARY SYNDROME) (H): Status: ACTIVE | Noted: 2018-05-08

## 2021-06-16 PROBLEM — M54.2 CERVICALGIA: Status: ACTIVE | Noted: 2017-08-08

## 2021-06-16 PROBLEM — F43.21 ADJUSTMENT DISORDER WITH DEPRESSED MOOD: Status: ACTIVE | Noted: 2018-05-22

## 2021-06-16 PROBLEM — N18.9 ACUTE ON CHRONIC RENAL FAILURE (H): Status: ACTIVE | Noted: 2018-06-01

## 2021-06-16 PROBLEM — R45.86 EMOTIONAL LABILITY: Status: ACTIVE | Noted: 2018-05-22

## 2021-06-17 NOTE — PROGRESS NOTES
Pt having chest pain and elevated B/P post exercise.  Systolic over 220.  Dr High paged and discussed findings.  Recommended to obtain second images and reevaluate B/P.  If systolic still running over 180 take pt to ER.  Discussed with pt and wife and okay with plan.  Agata Art RN

## 2021-06-17 NOTE — PATIENT INSTRUCTIONS - HE
Patient Instructions by Tosin Hammonds MD at 11/29/2019  9:30 AM     Author: Tosin Hammonds MD Service: -- Author Type: Physician    Filed: 11/29/2019 10:41 AM Encounter Date: 11/29/2019 Status: Signed    : Tosin Hammonds MD (Physician)         Patient Education     Viral Bronchitis (Adult)    You have a viral bronchitis. Bronchitis is inflammation and swelling of the lining of the lungs. This is often caused by an infection. Symptoms include a dry, hacking cough that is worse at night. The cough may bring up yellow-green mucus. You may also feel short of breath or wheeze. Other symptoms may include tiredness, chest discomfort, and chills.  Bronchitis that is caused by a virus is not treated with antibiotics. Instead, medicines may be given to help relieve symptoms. Symptoms can last up to 2 weeks, although the cough may last much longer.  This illness is contagious during the first few days and is spread through the air by coughing and sneezing, or by direct contact (touching the sick person and then touching your own eyes, nose, or mouth).  Most viral illnesses resolve within 10 to 14 days with rest and simple home remedies, although they may sometimes last for several weeks.  Home care    If symptoms are severe, rest at home for the first 2 to 3 days. When you go back to your usual activities, don't let yourself get too tired.    Do not smoke. Also avoid being exposed to secondhand smoke.    You may use over-the-counter medicine to control fever or pain, unless another pain medicine was prescribed. If you have chronic liver or kidney disease or have ever had a stomach ulcer or gastrointestinal bleeding, talk with your healthcare provider before using these medicines. Also talk to your provider if you are taking medicine to prevent blood clots. Aspirin should never be given to anyone younger than 18 years of age who is ill with a viral infection or fever. It may cause severe liver or  brain damage.    Your appetite may be poor, so a light diet is fine. Avoid dehydration by drinking 6 to 8 glasses of fluids per day (such as water, soft drinks, sports drinks, juices, tea, or soup). Extra fluids will help loosen secretions in the nose and lungs.    Over-the-counter cough, cold, and sore-throat medicines will not shorten the length of the illness, but they may help to reduce symptoms. Don't use decongestants if you have high blood pressure.  Follow-up care  Follow up with your healthcare provider, or as advised. If you had an X-ray or ECG (electrocardiogram), a specialist will review it. You will be notified of any new findings that may affect your care.  If you are age 65 or older, or if you have a chronic lung disease or condition that affects your immune system, or you smoke, ask your healthcare provider about getting a pneumococcal vaccine and a yearly flu shot (influenza vaccine).  When to seek medical advice  Call your healthcare provider right away if any of these occur:    Fever of 100.4 F (38 C) or higher, or as directed by your healthcare provider    Coughing up increased amounts of colored sputum    Weakness, drowsiness, headache, facial pain, ear pain, or a stiff neck  Call 911  Call 911 if any of these occur:    Coughing up blood    Worsening weakness, drowsiness, headache, or stiff neck    Trouble breathing, wheezing, or pain with breathing  Date Last Reviewed: 9/13/2015 2000-2017 [employer]. [address] [place] [id number]. All rights reserved. This information is not intended as a substitute for professional medical care. Always follow your healthcare professional's instructions.

## 2021-06-17 NOTE — PATIENT INSTRUCTIONS - HE
"Patient Instructions by Arabella Desir MD at 11/25/2019 12:40 PM     Author: Arabella Desir MD Service: -- Author Type: Physician    Filed: 11/25/2019  2:48 PM Encounter Date: 11/25/2019 Status: Addendum    : Arabella Desir MD (Physician)    Related Notes: Original Note by Arabella Desir MD (Physician) filed at 11/25/2019  2:48 PM       1. Keep well hydrated  2. May alternate Tylenol every 6 hours with ibuprofen every 6 hours as needed for fever or pain  3. If follow up is needed, try and be seen at your primary clinic. Or you can be seen by any primary care provider at one of our other [facility] sites  4. If you have any questions, call the clinic number  - it's answered 24/7    Patient Education     Viral Syndrome (Adult)  A viral illness may cause a number of symptoms. The symptoms depend on the part of the body that the virus affects. If it settles in your nose, throat, and lungs, it may cause cough, sore throat, congestion, and sometimes headache. If it settles in your stomach and intestinal tract, it may cause vomiting and diarrhea. Sometimes it causes vague symptoms like \"aching all over,\" feeling tired, loss of appetite, or fever.  A viral illness usually lasts 1 to 2 weeks, but sometimes it lasts longer. In some cases, a more serious infection can look like a viral syndrome in the first few days of the illness. You may need another exam and additional tests to know the difference. Watch for the warning signs listed below.  Home care  Follow these guidelines for taking care of yourself at home:    If symptoms are severe, rest at home for the first 2 to 3 days.    Stay away from cigarette smoke - both your smoke and the smoke from others.    You may use over-the-counter acetaminophen or ibuprofen for fever, muscle aching, and headache, unless another medicine was prescribed for this. If you have chronic liver or kidney disease or ever had a stomach ulcer or GI bleeding, talk with your " doctor before using these medicines. No one who is younger than 18 and ill with a fever should take aspirin. It may cause severe disease or death.    Your appetite may be poor, so a light diet is fine. Avoid dehydration by drinking 8 to 12 8-ounce glasses of fluids each day. This may include water; orange juice; lemonade; apple, grape, and cranberry juice; clear fruit drinks; electrolyte replacement and sports drinks; and decaffeinated teas and coffee. If you have been diagnosed with a kidney disease, ask your doctor how much and what types of fluids you should drink to prevent dehydration. If you have kidney disease, drinking too much fluid can cause it build up in the your body and be dangerous to your health.    Over-the-counter remedies won't shorten the length of the illness but may be helpful for cough, sore throat; and nasal and sinus congestion. Don't use decongestants if you have high blood pressure.  Follow-up care  Follow up with your healthcare provider if you do not improve over the next week.  Call 911  Call 911 if any of the following occur:    Convulsion    Feeling weak, dizzy, or like you are going to faint    Chest pain, shortness of breath, wheezing, or difficulty breathing  When to seek medical advice  Call your healthcare provider right away if any of these occur:    Cough with lots of colored sputum (mucus) or blood in your sputum    Chest pain, shortness of breath, wheezing, or difficulty breathing    Severe headache; face, neck, or ear pain    Severe, constant pain in the lower right side of your belly (abdominal)    Continued vomiting (cant keep liquids down)    Frequent diarrhea (more than 5 times a day); blood (red or black color) or mucus in diarrhea    Feeling weak, dizzy, or like you are going to faint    Extreme thirst    Fever of 100.4 F (38 C) or higher, or as directed by your healthcare provider  Date Last Reviewed: 9/25/2015 2000-2017 [employer]. [address], [place] [address]. All rights reserved. This information is not intended as a substitute for professional medical care. Always follow your healthcare professional's instructions.       Patient Education     Viral Bronchitis (Adult)    You have a viral bronchitis. Bronchitis is inflammation and swelling of the lining of the lungs. This is often caused by an infection. Symptoms include a dry, hacking cough that is worse at night. The cough may bring up yellow-green mucus. You may also feel short of breath or wheeze. Other symptoms may include tiredness, chest discomfort, and chills.  Bronchitis that is caused by a virus is not treated with antibiotics. Instead, medicines may be given to help relieve symptoms. Symptoms can last up to 2 weeks, although the cough may last much longer.  This illness is contagious during the first few days and is spread through the air by coughing and sneezing, or by direct contact (touching the sick person and then touching your own eyes, nose, or mouth).  Most viral illnesses resolve within 10 to 14 days with rest and simple home remedies, although they may sometimes last for several weeks.  Home care    If symptoms are severe, rest at home for the first 2 to 3 days. When you go back to your usual activities, don't let yourself get too tired.    Do not smoke. Also avoid being exposed to secondhand smoke.    You may use over-the-counter medicine to control fever or pain, unless another pain medicine was prescribed. If you have chronic liver or kidney disease or have ever had a stomach ulcer or gastrointestinal bleeding, talk with your healthcare provider before using these medicines. Also talk to your provider if you are taking medicine to prevent blood clots. Aspirin should never be given to anyone younger than 18 years of age who is ill with a viral infection or fever. It may cause severe liver or brain damage.    Your appetite may be poor, so a light diet is fine. Avoid  dehydration by drinking 6 to 8 glasses of fluids per day (such as water, soft drinks, sports drinks, juices, tea, or soup). Extra fluids will help loosen secretions in the nose and lungs.    Over-the-counter cough, cold, and sore-throat medicines will not shorten the length of the illness, but they may help to reduce symptoms. Don't use decongestants if you have high blood pressure.  Follow-up care  Follow up with your healthcare provider, or as advised. If you had an X-ray or ECG (electrocardiogram), a specialist will review it. You will be notified of any new findings that may affect your care.  If you are age 65 or older, or if you have a chronic lung disease or condition that affects your immune system, or you smoke, ask your healthcare provider about getting a pneumococcal vaccine and a yearly flu shot (influenza vaccine).  When to seek medical advice  Call your healthcare provider right away if any of these occur:    Fever of 100.4 F (38 C) or higher, or as directed by your healthcare provider    Coughing up increased amounts of colored sputum    Weakness, drowsiness, headache, facial pain, ear pain, or a stiff neck  Call 911  Call 911 if any of these occur:    Coughing up blood    Worsening weakness, drowsiness, headache, or stiff neck    Trouble breathing, wheezing, or pain with breathing  Date Last Reviewed: 9/13/2015 2000-2017 [employer]. [address] [place] [id number]. All rights reserved. This information is not intended as a substitute for professional medical care. Always follow your healthcare professional's instructions.

## 2021-06-17 NOTE — PROGRESS NOTES
" Patient ID: Sachin Berry is a 68 y.o. male.  /74  Pulse 78  Ht 6' 1\" (1.854 m)  Wt (!) 262 lb 14.4 oz (119.3 kg)  SpO2 98%  BMI 34.69 kg/m2    Assessment/Plan:                Diagnoses and all orders for this visit:    Type 2 diabetes mellitus  -     Glycosylated Hemoglobin A1c  -     glipiZIDE (GLUCOTROL XL) 5 MG 24 hr tablet; TAKE 1 TABLET (5 MG TOTAL) BY MOUTH DAILY WITH BREAKFAST.  Dispense: 90 tablet; Refill: 3  -     Comprehensive Metabolic Panel  -     Lipid Alexander FASTING  -     NM Exercise Stress Test; Future; Expected date: 4/17/18    Chest pain  -     Electrocardiogram Perform and Read  -     XR Chest 2 Views  -     NM Exercise Stress Test; Future; Expected date: 4/17/18    Memory loss, short term  -     Thyroid Cascade  -     Vitamin B12  -     Ambulatory referral to Sleep Medicine    Chronic Kidney Disease, Stage 3  -     Comprehensive Metabolic Panel  -     HM1(CBC and Differential)  -     HM1 (CBC with Diff)    Vitamin D deficiency  -     Vitamin D, Total (25-Hydroxy)    Temperature intolerance  -     Thyroid Alexander    Elevated PSA  -     PSA, Annual Screen (Prostatic-Specific Antigen)    PORSHA (obstructive sleep apnea)  -     Ambulatory referral to Sleep Medicine    Other orders  -     Cancel: Vitamin D, Total (25-Hydroxy)  -     nitroglycerin (NITROSTAT) 0.4 MG SL tablet; Place 1 tablet (0.4 mg total) under the tongue every 5 (five) minutes as needed for chest pain.  Dispense: 25 tablet; Refill: 3    DISCUSSION  Chest pain is concerning for angina.  Based on the describe symptoms it seems to be stable.  EKG does show some flattening of T waves the significance is uncertain.  I have encouraged him to avoid significant exertion.  I have provided him with a prescription for nitroglycerin and instructed him on proper use.  Discussed symptoms which necessitate an emergent evaluation.  We will get him set up to have a stress test in the near future.  Continue other medications reinforced " importance of taking aspirin for risk reduction.    Continue current management of diabetes and other medical concerns pending laboratory tests.    We will recheck the PSA given the increase from his previous baseline at his check back in November.    For the memory difficulty it is possible there may be an underlying evolving dementia process but I am more convinced that sleep is a significant factor and needs to be addressed first.  We will again obtain some basic labs to make sure that there are no other considerations.  We will make a referral to sleep specialist.  Subjective:     HPI    Sachin Berry is a 68-year-old man who is here today with his wife.  He has a complex underlying medical history that includes insulin-dependent diabetes, chronic kidney disease, recent concerns regarding memory difficulty and very recent concerns regarding chest pain associated with activity.    He reports that on 2-3 instances in the past few weeks he has had chest tightness when doing physical exertion.  He describes lifting a heavy object, doing snow shoveling and uncertain as to what he was doing with the third episode.  The tightness subsided after a short period of time.  Patient minimizes any other symptoms but his wife states that at one point he was with some friends who were very concerned because he had to go sit down and rest and appeared ill during 1 of these episodes.  He does have significant vascular risks as outlined in his history above, he is at a previous normal stress test in 2013.    Over the past couple of years he and his wife both have mentioned some concerns regarding memory difficulty.  This seems to wax and wane without any identifiable cause.  We have engaged in laboratory testing.  He does have a noted history of obstructive sleep apnea.  He was never really tolerant of treatment.  For a time his weight reduced significantly and his snoring symptoms went away completely and it was felt that he  "did not have any need to treat sleep apnea.  More recently snoring has again begun to occur.  Other sleep disturbances are noted including sleepwalking.  We discussed the possibility of an underlying dementia type process but also high likelihood that sleep apnea may be contributing to some of the symptoms.  Discussed making a referral for a reevaluation of his sleep concerns.  He denies taking any over-the-counter sleep medications specifically antihistamines or other medications that might adversely affect mentation or adversely affect sleep.    It is reported he has blood sugars that are typically around 120 when checked in the morning while fasting and again at bedtime.  His A1c has increased slightly suggestive of some higher readings throughout the course of the day but it is certainly within acceptable range.  Hypoglycemia is not a current concern.  Reviewed other monitoring parameters associated with his diabetes.    His chronic kidney disease with a creatinine that has been around 2.0 suggestive of chronic kidney disease stage IIIb borderline stage IV.  Discussed reassessment of this.        Review of Systems  Complete review of systems is obtained.  Other than the specific considerations noted above complete review of systems is negative.    Objective:   Medications:  Current Outpatient Prescriptions   Medication Sig     ACCU-CHEK SMARTVIEW TEST STRIP strips USE ONE STRIP WITH YOUR ACCU-CHEK JEANNIE METER TO CHECK BLOOD SUGARS 3 TIME(S) DAILY     albuterol (PROVENTIL HFA;VENTOLIN HFA) 90 mcg/actuation inhaler Inhale 2 puffs every 6 (six) hours as needed for wheezing or shortness of breath (COUGH).     allopurinol (ZYLOPRIM) 100 MG tablet TAKE 1 TABLET (100 MG TOTAL) BY MOUTH DAILY.     aspirin 81 MG EC tablet Take 81 mg by mouth daily.     BD INSULIN PEN NEEDLE UF SHORT 31 gauge x 5/16\" Ndle USE AS DIRECTED     blood glucose test (ACCU-CHEK JAMSHID PLUS TEST STRP) strips Test Blood sugar Twice daily.  Dispense " "brand per patient's insurance at pharmacy discretion.     cloNIDine HCl (CATAPRES) 0.1 MG tablet TAKE 1 TABLET BY MOUTH TWICE A DAY     diltiazem (CARDIZEM CD) 240 MG 24 hr capsule TAKE 1 CAPSULE BY MOUTH TWICE A DAY.     generic lancets (ACCU-CHEK FASTCLIX) Use one lancet with your Accu-Chek Leigh meter to check blood sugar as directed     glipiZIDE (GLUCOTROL XL) 5 MG 24 hr tablet TAKE 1 TABLET (5 MG TOTAL) BY MOUTH DAILY WITH BREAKFAST.     insulin glargine (LANTUS SOLOSTAR) 100 unit/mL (3 mL) pen Inject 40 Units under the skin daily.     insulin syringe-needle U-100 (BD INSULIN SYRINGE ULT-FINE II) 1 mL 31 x 5/16\" Syrg Inject daily as directed.     losartan-hydrochlorothiazide (HYZAAR) 100-25 mg per tablet TAKE 1 TABLET BY MOUTH ONCE DAILY     NEEDLES, INSULIN DISPOSABLE (BD INSULIN PEN NEEDLE UF SHORT MISC) Use As Directed. 31G X 8 MM     sildenafil (REVATIO) 20 mg tablet Take 1-3 tablets (20-60 mg total) by mouth daily.     simvastatin (ZOCOR) 40 MG tablet TAKE 1 TABLET BY MOUTH EVERY DAY     nitroglycerin (NITROSTAT) 0.4 MG SL tablet Place 1 tablet (0.4 mg total) under the tongue every 5 (five) minutes as needed for chest pain.     scopolamine (TRANSDERM-SCOP) 1 mg over 3 days transdermal patch Place 1 patch on the skin every third day.     Allergies:  Allergies   Allergen Reactions     Amlodipine      Edema       Amoxicillin-Pot Clavulanate Hives     Indomethacin      Dyspepsia       Metoprolol Succinate Hives     Tobacco:   reports that he has quit smoking. He has never used smokeless tobacco.        Wt Readings from Last 3 Encounters:   04/17/18 (!) 262 lb 14.4 oz (119.3 kg)   11/28/17 (!) 252 lb (114.3 kg)   08/08/17 (!) 252 lb (114.3 kg)       Cholesterol:     LDL Calculated   Date Value   11/28/2017 58 mg/dL   03/02/2015 62 mg/dL   04/21/2014   Invalid, Triglyceride >300      Blood Pressure:   BP Readings from Last 3 Encounters:   04/17/18 154/74   12/28/17 138/64   11/28/17 140/64       Immunization " "History   Administered Date(s) Administered     DT (pediatric) 01/01/1992, 04/25/2003     Influenza N8x9-29, 12/08/2009     Influenza high dose, seasonal 10/22/2015, 11/07/2016, 11/28/2017     Influenza, Seasonal, Inj PF IIV3 09/23/2010     Influenza, inj, historic,unspecified 11/09/2007, 10/30/2008     Influenza, seasonal,quad inj 6-35 mos 11/13/2009, 09/23/2010, 10/11/2011, 10/16/2012, 10/01/2013     Influenza,seasonal quad, PF 11/13/2014     Influenza,seasonal quad, PF, 36+MOS 11/13/2014     Influenza,seasonal, Inj IIV3 12/29/2005, 12/28/2006, 11/09/2007, 10/30/2008, 11/13/2009, 10/11/2011, 10/16/2012, 10/01/2013     Pneumo Conj 13-V (2010&after) 10/22/2015     Pneumo Polysac 23-V 10/01/2008, 03/24/2014     Tdap 05/18/2010     ZOSTER, LIVE 06/13/2008     There are no preventive care reminders to display for this patient.     Physical Exam          /74  Pulse 78  Ht 6' 1\" (1.854 m)  Wt (!) 262 lb 14.4 oz (119.3 kg)  SpO2 98%  BMI 34.69 kg/m2    General Appearance:    Alert, cooperative, no distress, appears stated age   Head:    Normocephalic, without obvious abnormality, atraumatic   Eyes:   No scleral icterus or conjunctival irritation       Ears:    Normal TM's and external ear canals, both ears   Throat:   Lips, mucosa, and tongue normal; teeth and gums normal   Neck:   Supple, symmetrical, trachea midline, no adenopathy;        thyroid:  No enlargement/tenderness/nodules   Lungs:     Clear to auscultation bilaterally, respirations unlabored   Heart:    Regular rate and rhythm, S1 and S2 normal, no murmur, rub   or gallop   Abdomen:     Soft, non-tender, bowel sounds activ    no masses, no organomegaly   Extremities:   Extremities normal, atraumatic, no cyanosis or edema   Pulses:   2+ and symmetric all extremities   Skin:   Skin color, texture, turgor normal, no rashes or lesions   Neurologic:   CNII-XII intact. Normal strength, sensation                         "

## 2021-06-17 NOTE — PROGRESS NOTES
After images B/P 172/80.  Pt instructed not to do any exertional activity till receives the results of the test.  If he has chest pain he is to take NTG as prescribed and call 911.  Pt verbalized understanding of instructions.  IV D/Alexandre and pt sent home with wife.  Agata Art RN

## 2021-06-18 NOTE — ANESTHESIA PREPROCEDURE EVALUATION
Anesthesia Evaluation        Airway   Mallampati: III  Neck ROM: limited   Pulmonary - normal exam   (+) sleep apnea on CPAP, ,                          Cardiovascular   Exercise tolerance: > or = 4 METS  (+) hypertension, CAD, angina, ,   beta blocker therapy NOT received within 24 hours of incision.,Reason: Other medical reasons *.   Allergic to beta blockers  ECG reviewed  Rhythm: regular  Rate: normal,      ROS comment: Anemia      Echo:    When compared to the previous study dated 12/14/2016, no significant change.    Left ventricle ejection fraction is normal. The calculated left ventricular ejection fraction is 70%.    Normal left ventricular size and systolic function.    Normal right ventricular size and systolic function.    Aortic Valve: Mild aortic stenosis.     Neuro/Psych      Comments: HEAD MRI:   1. No acute infarct, mass lesion or hemorrhage.  2. Mild age-related changes as detailed above.  3. Mid right mastoid fluid.    HEAD MRA:   1. Mild to moderate atheromatous irregularity and narrowing of proximal intracranial vasculature, most conspicuously affecting the left intracranial vertebral artery and cavernous/paraclinoid left internal carotid artery.  2. No aneurysm, high flow AVM or vascular cut off.    Carotid U/S  1.. Mild to moderate atheromatous plaque in the carotid arteries.  2. Flow velocity in the left internal carotid artery is borderline for 50-69% stenosis.  3. No significant stenosis of the right internal carotid artery      Endo/Other    (+) diabetes mellitus type 2 poorly controlled, obesity (BMI 33.95),      GI/Hepatic/Renal    (+)   chronic renal disease ESRD,      Other findings: Gout  H/o parasomnia with violent activity Overweight habitus    Large vesicular lesion on right lower lip s/w cold sore      Dental - normal exam                        Anesthesia Plan  Planned anesthetic: general endotracheal  Discussed the slightly increased risk of intraoperative awareness in cardiac  and trauma cases and reassured patient this was very unlikely and even less likely to be a painful experience.  No history of esophageal issues.  Precedex  Methadone 10 mg please  Insulin infusion, pt will not take his regular dose  ASA 3   Induction: intravenous   Anesthetic plan and risks discussed with: patient and spouse  Anesthesia plan special considerations: increased risk of difficult airway, antiemetics, CVP line, arterial catheterization, pulmonary artery catheterization,   Post-op plan: routine recovery

## 2021-06-18 NOTE — PROGRESS NOTES
Patient ID: Sachin Berry is a 68 y.o. male.  /58  Pulse 97  Wt (!) 240 lb (108.9 kg)  SpO2 98%  BMI 30.81 kg/m2    Assessment/Plan:                   Diagnoses and all orders for this visit:    Chronic Kidney Disease, Stage 3  -     HM2(CBC w/o Differential)  -     Basic Metabolic Panel    Type 2 diabetes mellitus with stage 3 chronic kidney disease, with long-term current use of insulin    S/P CABG (coronary artery bypass graft)    VINCENT (acute kidney injury)    Hypotension    Other orders  -     Cancel: Comprehensive Metabolic Panel  -     Cancel: Microalbumin, Random Urine  -     isosorbide mononitrate (IMDUR) 30 MG 24 hr tablet; Take 1 tablet (30 mg total) by mouth daily.  Dispense: 30 tablet; Refill: 11           DISCUSSION  Stop Clonidine for now.    Reduce dose of Imdur (isosorbide mononitrate) to 30 mg once daily.  NEW PRESCRIPTION to replace the old.    When blood sugar consistently above 100 increase glipizide to full tablet.    Written instructions provided for patient.  I think he is overtreated for blood pressure at this point and this is more detrimental.  Discussed to increase the glipizide as his blood sugar stabilized and his appetite improves.  Check additional labs as noted.  Medications reconciled today.  Hospital information reviewed.  Subjective:     HPI    Sachin Berry is a 68 y.o. male with a complex medical history who is here today with his wife to follow-up from a hospitalization that occurred June 1-5.  He was initially seen for a hospital follow-up by me on May 29.  Just prior to that visit he had been discharged after having had coronary artery bypass surgery of 4 vessels on May 18.  He was felt to be doing reasonably well in his recovery at that point however within days of that follow-up visit he had worsening symptoms including exhaustion, weakness and low blood pressure.    He was found to have acute on chronic renal failure and hypotension in addition to his  above-described symptoms.  He was found to have elevated creatinine of 4.75 up from his baseline.  He was administered intravenous fluids, blood pressure medication doses were adjusted.  Patient had improvement and was discharged home in good condition.  Creatinine at the time of discharge was 2.51 which was essentially at his usual baseline.    He continues to feel weak tired and rundown.  His appetite is poor but improving effort to keep well-hydrated and his wife agrees.  No vomiting or diarrhea.  Does not feel lightheaded or dizzy.  Sleeping often.  Blood pressure noted to be running low systolic even at this point.  On a multitude of blood pressure lowering medications including hydralazine, clonidine, diltiazem, furosemide, isosorbide mononitrate.  Discussed with him that I do not think we need to have his blood pressure be running so low and probably this is detrimental as he continues with his recovery.    His diabetes and his blood sugars have been as low as 79 and one occasion and typically around 120 in the morning when he wakes but sometimes throughout the day can be higher even close to 200 later in the day.  We discussed short-term and long-term management of blood sugar including potential adjustments to medications.    Review of Systems  Complete review of systems is obtained.  Other than the specific considerations noted above complete review of systems is negative.        Objective:   Medications:  Current Outpatient Prescriptions   Medication Sig     allopurinol (ZYLOPRIM) 100 MG tablet TAKE 1 TABLET (100 MG TOTAL) BY MOUTH DAILY.     aspirin 81 MG EC tablet Take 81 mg by mouth every evening.      atorvastatin (LIPITOR) 20 MG tablet Take 1 tablet (20 mg total) by mouth daily.     cholecalciferol, vitamin D3, 5,000 unit Tab Take 5,000 Units by mouth daily.     cloNIDine HCl (CATAPRES) 0.1 MG tablet Take 1 tablet (0.1 mg total) by mouth 2 (two) times a day.     cyanocobalamin 1000 MCG tablet Take  1,000 mcg by mouth daily.     diltiazem (CARDIZEM CD) 240 MG 24 hr capsule Take 1 capsule (240 mg total) by mouth daily.     fenofibrate (TRICOR) 48 MG tablet Take 1 tablet (48 mg total) by mouth daily.     furosemide (LASIX) 40 MG tablet Take 1 tablet (40 mg total) by mouth daily.     glipiZIDE (GLUCOTROL XL) 5 MG 24 hr tablet TAKE 1/2 TABLET (2.5 MG TOTAL) BY MOUTH DAILY WITH BREAKFAST.     hydrALAZINE (APRESOLINE) 50 MG tablet Take 0.5 tablets (25 mg total) by mouth 3 (three) times a day.     isosorbide mononitrate (IMDUR) 60 MG 24 hr tablet Take 1 tablet (60 mg total) by mouth daily.     LANTUS U-100 INSULIN 100 unit/mL injection Inject 25 Units under the skin at bedtime.     nitroglycerin (NITROSTAT) 0.4 MG SL tablet Place 1 tablet (0.4 mg total) under the tongue every 5 (five) minutes as needed for chest pain.     omega-3/dha/epa/fish oil (FISH OIL-OMEGA-3 FATTY ACIDS) 300-1,000 mg capsule Take 1 g by mouth daily.     isosorbide mononitrate (IMDUR) 30 MG 24 hr tablet Take 1 tablet (30 mg total) by mouth daily.     Allergies:  Allergies   Allergen Reactions     Blood-Group Specific Substance      Anti-E.  Allow additional time for obtaining blood for transfusion.      Amlodipine      Edema       Amoxicillin-Pot Clavulanate Hives     Indomethacin      Dyspepsia       Metoprolol Succinate Hives     Tobacco:   reports that he has never smoked. He has never used smokeless tobacco.     Physical Exam      /58  Pulse 97  Wt (!) 240 lb (108.9 kg)  SpO2 98%  BMI 30.81 kg/m2          General Appearance:    Alert, cooperative, no distress, appears stated age   Eyes:   No scleral icterus or conjunctival irritation       Lungs:     Clear to auscultation bilaterally, respirations unlabored, no wheezes or crackles, surgical incision is healing without signs of infection dehiscence or other problems   Heart:    Regular rate and rhythm, systolic murmur heard at left sternal border soft   Abdomen:     Soft, non-tender,  bowel sounds active,       Extremities:   Extremities normal, atraumatic, no cyanosis or edema   Skin:   Skin color, texture, turgor normal, no rashes or lesions   Neurologic:   Normal strength and sensation        throughout on gross examination.

## 2021-06-18 NOTE — PROGRESS NOTES
Dear  Brandan Arndt Md  [address]  [place] [address]    Thank you for the opportunity to participate in the care of  Sachin Berry.    He is a 68 y.o. y/o who comes to the clinic for consultation.     Mr. Berry was diagnosed with obstructive sleep apnea several years ago and he recalls using a CPAP device for some time. He stopped using his device many years ago. Recently, he developed memory problems and as part of his evaluation for memory problems, he was advised to reestablish care for his sleep apnea. He is not using any form of treatment right now.   He continues to snore at night but the snoring is not very loud.     His wife also reports that he is very violent during sleep. She describes episodes during which the patient would wake up in the middle of the night and kick violently. These episodes have been intermittent and happen every 3 to 4 years. He has not an episode of violence during sleep for more than 1 year. His wife feels that some of these episodes were related to low blood sugar levels    STOP BANG 6/20/2018   Do you snore loudly (louder than talking or loud enough to be heard through closed doors)? 0   Do you often feel tired, fatigued, or sleepy during daytime? 1   Has anyone observed you stop breathing in your sleep? 0   Do you have or are you being treated for high blood pressure? 1   BMI more than 35 kg/m2 0   Age over 50 years old? 1   Neck circumference greater than 16 inches? 1   Gender male? 1   Total Score 5   Epworths Sleepiness Scale 6/20/2018   Sitting and reading 0   Watching TV 2   Sitting, inactive in a public place (e.g. a theatre or a meeting) 2   As a passenger in a car for an hour without a break 0   Lying down to rest in the afternoon when circumstances permit 2   Sitting and talking to someone 0   Sitting quietly after a lunch without alcohol 0   In a car, while stopped for a few minutes in traffic 0   Total score 6   Rooming 6/20/2018   Usual  bedtime 9pm   Sleep Latency 5 minutes   Awakenings 1 time   Wake Up Time 6am   Weekends same   Energy Drinks no   Coffee no   Cola no   Difficulty falling asleep No   Difficulty staying asleep Yes   Excessive daytime tiredness Yes   Excessive daytime sleepiness Yes   Dozing off while driving No   Shift Worker No   Sleep Walking? Yes   Sleep Talking? Yes   Kicking or punching? Yes   Restless legs symptoms No       Past Medical History  Past Medical History:   Diagnosis Date     CAD (coronary artery disease)      Chronic Kidney Disease, Stage 3     Created by Conversion      Diabetic Peripheral Neuropathy     Created by Conversion [facility] Annotation: Mar 16 2008  5:03PM - Brandan Arndt: MILD      Gout     resolved     Heart murmur      [place] (hard of hearing)      Hyperlipidemia     Created by Conversion      Hypertension     Created by Conversion      Neck rigidity     fused, non surgical     Rotator cuff tendonitis      Sleep apnea     does not use his CPAP     Sleep disorder     sleep walking, can wake up angry if touched when sleeping or sleep walking     Type 2 Diabetes Mellitus     Created by Conversion         Past Surgical History  Past Surgical History:   Procedure Laterality Date     CORONARY ARTERY BYPASS GRAFT N/A 5/17/2018    Procedure: CORONARY ARTERY BYPASS x 4, LEFT LEG ENDOSCOPIC VEIN HARVEST, LEFT INTERNAL MAMMARY ARTERY,EPI-AORTIC ULTRASOUND,  ANESTHESIA TRANSESOPHAGEAL ECHOCARDIOGRAM;  Surgeon: Meredith Peters MD;  Location: [facility] Main OR;  Service:      CV CORONARY ANGIOGRAM N/A 5/9/2018    Procedure: Coronary Angiogram;  Surgeon: Inderjit Bonilla MD;  Location: [facility] Cath Lab;  Service:      HERNIA REPAIR      umbilical     KNEE ARTHROSCOPY Right      CT LAP,CHOLECYSTECTOMY      Description: Cholecystectomy Laparoscopic;  Recorded: 03/16/2008;        Meds  Current Outpatient Prescriptions   Medication Sig Dispense Refill     allopurinol (ZYLOPRIM) 100 MG tablet TAKE 1 TABLET (100  MG TOTAL) BY MOUTH DAILY. 90 tablet 2     aspirin 81 MG EC tablet Take 81 mg by mouth every evening.        atorvastatin (LIPITOR) 20 MG tablet Take 1 tablet (20 mg total) by mouth daily. 60 tablet 0     cholecalciferol, vitamin D3, 5,000 unit Tab Take 5,000 Units by mouth daily.       cloNIDine HCl (CATAPRES) 0.1 MG tablet Take 1 tablet (0.1 mg total) by mouth 2 (two) times a day.  2     cyanocobalamin 1000 MCG tablet Take 1,000 mcg by mouth daily.       diltiazem (CARDIZEM CD) 240 MG 24 hr capsule Take 1 capsule (240 mg total) by mouth daily.  3     fenofibrate (TRICOR) 48 MG tablet Take 1 tablet (48 mg total) by mouth daily. 60 tablet 0     furosemide (LASIX) 40 MG tablet Take 1 tablet (40 mg total) by mouth daily. 90 tablet 0     glipiZIDE (GLUCOTROL XL) 5 MG 24 hr tablet TAKE 1/2 TABLET (2.5 MG TOTAL) BY MOUTH DAILY WITH BREAKFAST. 90 tablet 3     hydrALAZINE (APRESOLINE) 50 MG tablet Take 0.5 tablets (25 mg total) by mouth 3 (three) times a day. 90 tablet 0     isosorbide mononitrate (IMDUR) 60 MG 24 hr tablet Take 1 tablet (60 mg total) by mouth daily. 60 tablet 0     LANTUS U-100 INSULIN 100 unit/mL injection Inject 25 Units under the skin at bedtime. 10 mL PRN     nitroglycerin (NITROSTAT) 0.4 MG SL tablet Place 1 tablet (0.4 mg total) under the tongue every 5 (five) minutes as needed for chest pain. 25 tablet 3     omega-3/dha/epa/fish oil (FISH OIL-OMEGA-3 FATTY ACIDS) 300-1,000 mg capsule Take 1 g by mouth daily.       No current facility-administered medications for this visit.         Allergies  Blood-group specific substance; Amlodipine; Amoxicillin-pot clavulanate; Indomethacin; and Metoprolol succinate     Social History  Social History     Social History     Marital status:      Spouse name: N/A     Number of children: N/A     Years of education: N/A     Occupational History     Not on file.     Social History Main Topics     Smoking status: Never Smoker     Smokeless tobacco: Never Used      "Alcohol use Yes      Comment: rarely     Drug use: No     Sexual activity: Not on file     Other Topics Concern     Not on file     Social History Narrative        Family History  Family History   Problem Relation Age of Onset     Alzheimer's disease Mother      Chronic Kidney Disease Father      Breast cancer Sister            Review of Systems:  Constitutional: Negative except as noted in HPI.   Eyes: Negative except as noted in HPI.   ENT: Negative except as noted in HPI.   Cardiovascular: Negative except as noted in HPI.   Respiratory: Negative except as noted in HPI.   Gastrointestinal: Negative except as noted in HPI.   Genitourinary: Negative except as noted in HPI.   Musculoskeletal: Negative except as noted in HPI.   Integumentary: Negative except as noted in HPI.   Neurological: Negative except as noted in HPI.   Psychiatric: Negative except as noted in HPI.   Endocrine: Negative except as noted in HPI.   Hematologic/Lymphatic: Negative except as noted in HPI.      Physical Exam:  /50  Pulse (!) 110  Ht 6' 2\" (1.88 m)  Wt (!) 237 lb (107.5 kg)  SpO2 98%  BMI 30.43 kg/m2  BMI:Body mass index is 30.43 kg/(m^2).   GEN: NAD, obese  Head: Normocephalic.  EYES: PERRLA, EOMI  ENT: Oropharynx is clear, Mallampatti class IVairway. Uvula is edematous  Nasal mucosa is pink  Neck : Thyroid is not palpable  CV: Regular rate and rhythm, S1 & S2 are normal. No murmurs  LUNGS: clear to auscultation bilaterally, no wheezes  ABDOMEN: positive bowel sounds, soft, no rebound or guarding  MUSCULOSKELETAL: no clubbing, cyanosis or edema  SKIN: warm, dry, no rashes  Neurological: Alert, oriented to time, place, and person.  No tremor  No cog wheeling  Coordination is intact.  Psych:  normal mood, normal affect     Labs/Studies:     Lab Results   Component Value Date    WBC 6.2 06/11/2018    HGB 8.8 (L) 06/11/2018    HCT 26.4 (L) 06/11/2018    MCV 91 06/11/2018     06/11/2018         Chemistry        Component " Value Date/Time     06/11/2018 1136    K 4.3 06/11/2018 1136     06/11/2018 1136    CO2 22 06/11/2018 1136    BUN 58 (H) 06/11/2018 1136    CREATININE 3.39 (H) 06/11/2018 1136     06/11/2018 1136        Component Value Date/Time    CALCIUM 10.4 06/11/2018 1136    ALKPHOS 73 06/02/2018 0636    AST 33 06/02/2018 0636    ALT 36 06/02/2018 0636    BILITOT 0.7 06/02/2018 0636            Lab Results   Component Value Date    FERRITIN 788 (H) 05/22/2018     Lab Results   Component Value Date    TSH 0.96 04/17/2018     Lab Results   Component Value Date    HGBA1C 7.3 (H) 04/17/2018             Assessment and Plan:  In summary Sachin Berry is a 68 y.o. year old male here for consultation.    1. History of obstructive sleep apnea/snoring.  Recommend getting an overnight polysomnography to split per protocol.  The patient should return to the clinic to discuss results and treatment option in a patient-centered approach.    2. Violent behaviors during sleep.  These episodes could be related to hypoglycemia but there is also a possibility for a REM parasomnia.  We will add arm leads to his PSG.       Patient verbalized understanding of these issues, agrees with the plan and all questions were answered today. Patient was given an opportuntity to voice any other symptoms or concerns not listed above. Patient did not have any other symptoms or concerns.         MD DOUGLAS Interiano Board Certified in Internal Medicine and Sleep Medicine  [facility].

## 2021-06-18 NOTE — ANESTHESIA CARE TRANSFER NOTE
Last vitals:   Vitals:    05/17/18 1202   BP: 134/76   Pulse: 70   Resp: 18   Temp:    SpO2: 99%     Patient's level of consciousness is unresponsive  Spontaneous respirations: no: on vent  Maintains airway independently: no: on vent  Dentition unchanged: yes  Oropharynx: ETT tube in place. To icu with all monitors on. 100% O2 via ambu/ETT. Placed on vent per icu parameters    QCDR Measures:  ASA# 20 - Surgical Safety Checklist: WHO surgical safety checklist completed prior to induction  PQRS# 430 - Adult PONV Prevention: 4558F - Pt received => 2 anti-emetic agents (different classes) preop & intraop  ASA# 8 - Peds PONV Prevention: NA - Not pediatric patient, not GA or 2 or more risk factors NOT present  PQRS# 424 - Kimberly-op Temp Management: 4559F - At least one body temp DOCUMENTED => 35.5C or 95.9F within required timeframe  PQRS# 426 - PACU Transfer Protocol: - Transfer of care checklist used  ASA# 14 - Acute Post-op Pain: ASA14B - Patient did NOT experience pain >= 7 out of 10

## 2021-06-18 NOTE — ANESTHESIA POSTPROCEDURE EVALUATION
Patient: Sachin Berry  CORONARY ARTERY BYPASS x 4, LEFT LEG ENDOSCOPIC VEIN HARVEST, LEFT INTERNAL MAMMARY ARTERY,EPI-AORTIC ULTRASOUND,  ANESTHESIA TRANSESOPHAGEAL ECHOCARDIOGRAM  Anesthesia type: general    Patient location: ICU  Last vitals:   Vitals:    05/18/18 0740   BP:    Pulse:    Resp:    Temp:    SpO2: 98%     Post vital signs: stable  Level of consciousness: awake, alert and oriented  Post-anesthesia pain: pain needs to be addressed; Pt c/o left sided chest discomfort starting 5 min ago, especially when coughing.  He denies SOB or radiating pain.  Pt's RN was informed about pt's symptoms and will continue to monitor and treat.  Post-anesthesia nausea and vomiting: no  Pulmonary: unassisted, nasal cannula  Cardiovascular: stable and blood pressure at baseline  Hydration: adequate  Anesthetic events: no    QCDR Measures:  ASA# 11 - Kimberly-op Cardiac Arrest: ASA11B - Patient did NOT experience unanticipated cardiac arrest  ASA# 12 - Kimberly-op Mortality Rate: ASA12B - Patient did NOT die  ASA# 13 - PACU Re-Intubation Rate: ASA13B - Patient did NOT require a new airway mgmt  ASA# 10 - Composite Anes Safety: ASA10A - No serious adverse event    Additional Notes:

## 2021-06-18 NOTE — PROGRESS NOTES
Patient ID: Sachin Berry is a 68 y.o. male.  /56  Pulse 97  Wt (!) 236 lb 3.2 oz (107.1 kg)  SpO2 99%  BMI 30.33 kg/m2    Assessment/Plan:              Diagnoses and all orders for this visit:    Coronary artery disease involving native coronary artery of native heart with unstable angina pectoris (H)    CKD stage 4 due to type 2 diabetes mellitus (H)    Heart murmur, systolic    Essential hypertension    Anemia    Type 2 diabetes mellitus with stage 2 chronic kidney disease, with long-term current use of insulin    Slow transit constipation    Other orders  -     glipiZIDE (GLUCOTROL) 5 MG tablet; Take 0.5 tablets (2.5 mg total) by mouth 2 (two) times a day before meals. 1/2 Hour BEFORE meals  Dispense: 60 tablet; Refill: 6        DISCUSSION  Records reviewed as discussed.  No indication for labs today as they were obtained at kidney doctor yesterday and he is feeling much better.  I do not have labs to review but will make certain that we review them when they are available.  We will change glipizide to 5 mg regular release one half tablet twice a day.  Discussed the risks for hypoglycemia with use of this medication based on age and renal status.  They are aware of this and will proceed accordingly.  If there are concerns they will let me know.  He will continue to follow with specialty providers.  We discussed management of constipation today.  Subjective:     HPI    Sachin Berry is a 68 y.o. male who is here today with his wife for follow-up.  He has a complex medical history and had recently undergone coronary artery bypass graft surgery.  He has chronic kidney disease which has progressed now from stage III to stage IV during the course of his surgical treatment and recovery.  He has had a persistent anemia.  He has had significant fatigue issues and worsening kidney disease likely secondary to malnutrition and dehydration.  All of these concerns seem to be improving.  Today he looks  better and seems to feel better than he has in some time.  He saw his kidney specialist yesterday and that note is reviewed.  Medication changes were made to his blood pressure regiment in an effort to further improve kidney function and keep blood pressure controlled.  At my last visit with him we had made adjustments to medications because of hypotension.  Hypotension does not seem to be an issue at this point.  He is borderline tachycardic at times and his wife inquires about this and we discussed it briefly.  Likely this is related more to dehydration and compensation previously when there were more hypotensive readings obtained.  One occasion he had a pulse of 105 that was recorded as a resting pulse prior to cardiac rehab but his wife notes in retrospect that actually he had walked a fair distance into the hospital just before this pulse was taken.  We discussed the importance of the context with any vital sign measurement in terms of a big picture perspective.  Continue to feel cold often this is a phenomenon that was being worked up even prior to his diagnosis of coronary artery disease and bypass graft surgery.  He does not have any thyroid issue is a cause.  He does have anemia which is likely contributing factor.  I do wonder if medications such as calcium channel blocker may be contributing factor to this phenomenon as well.  I do not think this is representative of a serious life-threatening problem or of a vascular problem specifically.  Discussed this with them for now we will continue to monitor and may wish to consider medication evaluation and changes in the future for this specifically.  He has type 2 diabetes on 25 units of basal insulin combined with 2.5 mg extended release of glipizide.  They are cutting an extended release glipizide in half at the recommendation of hospitalist upon discharge.  I discussed with him items uncertain as to how reasonable this is with this particular medication.   We had a discussion and rather than get a specific answer this question we elected to change medication dosing altogether see above.  We will continue to monitor blood sugars closely and blood sugar numbers are reviewed today.  He continues to be constipated we reviewed constipation management today with suggestion of MiraLAX.    Review of Systems  Complete review of systems is obtained.  Other than the specific considerations noted above complete review of systems is negative.        Objective:   Medications:  Current Outpatient Prescriptions   Medication Sig Note     allopurinol (ZYLOPRIM) 100 MG tablet TAKE 1 TABLET (100 MG TOTAL) BY MOUTH DAILY.      aspirin 81 MG EC tablet Take 81 mg by mouth every evening.       atorvastatin (LIPITOR) 20 MG tablet Take 1 tablet (20 mg total) by mouth daily.      cyanocobalamin 1000 MCG tablet Take 1,000 mcg by mouth daily.      diltiazem (CARDIZEM CD) 240 MG 24 hr capsule Take 1 capsule (240 mg total) by mouth daily.      fenofibrate (TRICOR) 48 MG tablet Take 1 tablet (48 mg total) by mouth daily.      furosemide (LASIX) 40 MG tablet Take 1 tablet (40 mg total) by mouth daily.      isosorbide mononitrate (IMDUR) 30 MG 24 hr tablet Take 30 mg by mouth daily.      LANTUS U-100 INSULIN 100 unit/mL injection Inject 25 Units under the skin at bedtime.      losartan (COZAAR) 100 MG tablet  6/22/2018: Received from: External Pharmacy     nitroglycerin (NITROSTAT) 0.4 MG SL tablet Place 1 tablet (0.4 mg total) under the tongue every 5 (five) minutes as needed for chest pain.      omega-3/dha/epa/fish oil (FISH OIL-OMEGA-3 FATTY ACIDS) 300-1,000 mg capsule Take 1 g by mouth daily.      glipiZIDE (GLUCOTROL) 5 MG tablet Take 0.5 tablets (2.5 mg total) by mouth 2 (two) times a day before meals. 1/2 Hour BEFORE meals      Allergies:  Allergies   Allergen Reactions     Blood-Group Specific Substance      Anti-E.  Allow additional time for obtaining blood for transfusion.      Amlodipine       Edema       Amoxicillin-Pot Clavulanate Hives     Indomethacin      Dyspepsia       Metoprolol Succinate Hives     Tobacco:   reports that he has never smoked. He has never used smokeless tobacco.     Physical Exam      /56  Pulse 97  Wt (!) 236 lb 3.2 oz (107.1 kg)  SpO2 99%  BMI 30.33 kg/m2        General Appearance:    Alert, cooperative, no distress   Eyes:    No conjunctival irritation, no scleral icterus       Lungs:     Clear to auscultation bilaterally, respirations unlabored   Heart:    Regular rate and rhythm, S1 and S2 normal, no murmur, rub   or gallop   Extremities:   Extremities normal, atraumatic, no cyanosis or edema   Skin:   Skin color, texture, turgor normal, no rashes or lesions   Neurologic:   Normal strength and sensation

## 2021-06-18 NOTE — PROGRESS NOTES
Sachin RAMON DrakeBerry has participated in 3 sessions of Phase II Cardiac Rehab.    Progress Report:   Cardiac Rehab Treatment Progress Report 6/15/2018 6/19/2018 6/21/2018   Weight 238 lbs 6 oz 239 lbs 5 oz 235 lbs   Pre Exercise   104 104   Pre Exercise /70 128/60 132/60   Pre Blood Sugar (mg/dl) 121 179 208   Nustep Peak Heart Rate - 111 112   Nustep Peak Blood Pressure - 152/60 158/64   Heart Rate 99 107 110   Post Exercise /70 124/60 130/60   Post Blood Sugar (mg/dl) 176 193 243   ECG SR/ST with 1'AVB, rare PVCs SR/ST/1'AVB with multifocal PVC's SR/ST/1'AVB with proglonged QT with PVC's-rare   Total Exercise Minutes 6 28 33   Current Status:  Symptomatic: shortness of breath, Cold since surgery, SOB, poor balance   Therapists Comments: Pt's EKG has been showing ST/Prolonged QT/1' AVB with multifocal PVC's ( no runs for patterns for ectopy for FYI).    If Physician recommends change in treatment plan, please place orders.        __________________________________________________      _____________  Signature                                                                                                  Date

## 2021-06-18 NOTE — ANESTHESIA PROCEDURE NOTES
Central line    Start time: 5/17/2018 7:16 AM  End time: 5/17/2018 7:28 AM  Patient location: OR Post-induction  Indications: central pressure monitoring  Performing Anesthesiologist: MABEL FARIAS  Pre-procedure Checklist  Completed: patient identified, site marked, risks, benefits, and alternatives discussed, timeout performed, consent obtained, hand hygiene performed, all elements of maximal sterile barriers used including cap, mask, gown, sterile gloves, and large sheet and skin prep agent completely dried prior to procedure    Procedure Details:  Preparation: 2% chlorhexidine  Location details: right internal jugular  Site selection rationale: access  Catheter type: Introducer with [place]  Introducer type: MAC  Lumens:double lumen  Withdrawn and locked at: 46Number of attempts: 1  Ultrasound evaluation of access site: yes  Vessel patent by US exam  Concurrent real time visualization of needle entry  manometry confirmation of venous access    Post-procedure:   line sutured and Antimicrobial disks with CHG applied  Assessment: blood return through all ports and free fluid flow  Complications: arrhythmia

## 2021-06-18 NOTE — PROGRESS NOTES
RT Heart Teaching Worksheet       Date of Surgery  05/17/18          Date Taught  05/16/18  Time of Surgery  0730          Surgeon DR. Sanchez    IS  2500 ml Achieved        IS  2750 ml Predicted        Height  73 in.      History : Smoker ?: NO   Quit ? :  N/A           When?:N/A                        History: COPD ?:NO                                       Asthma ? : NO               PORSHA ? :  YES               Home Machine ? : CPAP ( patient stopped using CPAP 5 year ago)                                         What Meds if Lung History?: NO    Items to discuss with Patient : (done or not done)     Heart Pillow/ Coughing: DONE  Flutter Valve: DONE  Questions Answered:DONE    Charting that needs to be done: (done or not done)    IS charted in Flowsheet DONE  IS achieved placed in patient binder DONE  Education Charted in Ed Activity DONE  Charges/productivity DONE      YADIRA ZhengT

## 2021-06-18 NOTE — PROGRESS NOTES
"Spiritual Care Note    Spiritual Assessment:   referred to patient this morning on his heart testing day. Patient's wife is present for support. Patient shares finding out a short time ago that he would need heart surgery and seems to still be trying to take it in. Patient voices an awareness that it is, \"A serious thing,\" and that he is feeling better about it today. Patient's wife teary, states feeling ok, but admits she is sometimes, \"In a funk.\" Patient shares family will be present on day of surgery and that they will reach out to their Mosque should they desire sacramental support. Patient seems ready for surgery;  notes no concerns.     Care Provided:   shared prayer with patient and wife, offered encouragement and support and provided information on  availability.     Plan of Care: Spiritual care will continue to follow as part of patient's care team.    RUSS Forbes, BCC    "

## 2021-06-18 NOTE — PROGRESS NOTES
Patient ID: Sachin Berry is a 68 y.o. male.  /66  Pulse (!) 111  Wt (!) 242 lb (109.8 kg)  SpO2 99%  BMI 31.93 kg/m2    Assessment/Plan:                Diagnoses and all orders for this visit:    S/P CABG (coronary artery bypass graft)    Chronic Kidney Disease, Stage 3  -     Basic Metabolic Panel    Anemia  -     HM2(CBC w/o Differential)    Essential hypertension    Type 2 diabetes mellitus with stage 3 chronic kidney disease, with long-term current use of insulin    Dysuria  -     Urinalysis      DISCUSSION  Overall I think his recovery is going reasonably well.  Appetite is still poor but he is maintaining oral intake and it seems to be improving.  He is having minimal pain and no significant breathing difficulties at this point in time.  We will recheck his kidney function to ensure that he continues to improve.  His blood count was checked today and has increased nicely, they inquired about iron therapy but I do not think it is necessary based on how quickly his hemoglobin is increasing without it.  We discussed some adjustments to his Lantus insulin, we will increase up to 30 and continue to monitor closely with the idea that we will probably need to titrate up further as he begins to eat more.  For the urinary symptoms do not think there is an infection likely just postoperative recovery with some increased difficulty they will continue to monitor closely for the time being.  I recommend against taking any further antihistamine to help with sleep but recommend melatonin and discussed dosing.  Follow-up with me in 1 month.  5 me if there are problems in any regard otherwise prior to that.  Follow-up with specialty provider follow-up scheduled is reviewed.  Medications are reconciled.  Subjective:     HPI    Sachin Berry is a 68 y.o. male who developed unstable angina was admitted to the hospital for evaluation and ultimately underwent coronary artery bypass graft surgery of 4 vessels  on May 18, 2018.    His medical history is otherwise significant for type 2 diabetes on long-acting insulin, chronic kidney disease, history of sleep apnea, hypertension.  His diabetes was controlled and he was discharged on a reduced dose of medication.  Blood pressures are now increasing and noted to be 200 despite poor appetite and less overall eating.  He developed acute kidney injury on top of his chronic kidney disease with creatinine as high as 4.0.  Creatinine was 3.07 at the time of discharge.  He does have follow-up scheduled with nephrology.  Prior to his surgery kidney function have been stable.  Because of his worsening kidney function has hydrochlorthiazide and lisinopril have been held.  He is on other medications for blood pressure management at this time.  Blood pressure noted to be reasonably controlled.  Does have a history of sleep apnea had not been no treatment recently was scheduled to undergo a reevaluation in the near future.    He continues to report difficulty with appetite.  Gets exhausted easily.  Does note that he feels very exhausted after urinating in particular.  Denies distinct pain hematuria or other concerning findings.  Does not report any urinary retention type symptoms.  Denies vomiting or diarrhea.    Review of Systems  Complete review of systems is obtained.  Other than the specific considerations noted above complete review of systems is negative.       Objective:   Medications:  Current Outpatient Prescriptions   Medication Sig     allopurinol (ZYLOPRIM) 100 MG tablet TAKE 1 TABLET (100 MG TOTAL) BY MOUTH DAILY.     aspirin 81 MG EC tablet Take 81 mg by mouth every evening.      atorvastatin (LIPITOR) 20 MG tablet Take 1 tablet (20 mg total) by mouth daily.     cholecalciferol, vitamin D3, 5,000 unit Tab Take 5,000 Units by mouth daily.     cloNIDine HCl (CATAPRES) 0.1 MG tablet Take 2 tablets (0.2 mg total) by mouth 2 (two) times a day.     cyanocobalamin 1000 MCG tablet  Take 1,000 mcg by mouth daily.     diltiazem (CARDIZEM CD) 240 MG 24 hr capsule Take 1 capsule (240 mg total) by mouth daily.     fenofibrate (TRICOR) 48 MG tablet Take 1 tablet (48 mg total) by mouth daily.     furosemide (LASIX) 40 MG tablet Take 1 tablet (40 mg total) by mouth daily.     glipiZIDE (GLUCOTROL XL) 5 MG 24 hr tablet TAKE 1/2 TABLET (2.5 MG TOTAL) BY MOUTH DAILY WITH BREAKFAST.     hydrALAZINE (APRESOLINE) 50 MG tablet Take 1 tablet (50 mg total) by mouth 3 (three) times a day.     isosorbide mononitrate (IMDUR) 60 MG 24 hr tablet Take 1 tablet (60 mg total) by mouth daily.     LANTUS SOLOSTAR U-100 INSULIN 100 unit/mL (3 mL) pen Inject 20 Units under the skin at bedtime. 11.65 Type 2 with hyperglycemia   Contact provider if insulin prescribed is not the preferred insulin per insurance.     nitroglycerin (NITROSTAT) 0.4 MG SL tablet Place 1 tablet (0.4 mg total) under the tongue every 5 (five) minutes as needed for chest pain.     omega-3/dha/epa/fish oil (FISH OIL-OMEGA-3 FATTY ACIDS) 300-1,000 mg capsule Take 1 g by mouth daily.     Allergies:  Allergies   Allergen Reactions     Amlodipine      Edema       Amoxicillin-Pot Clavulanate Hives     Indomethacin      Dyspepsia       Metoprolol Succinate Hives     Tobacco:   reports that he has never smoked. He has never used smokeless tobacco.     Physical Exam      /66  Pulse (!) 111  Wt (!) 242 lb (109.8 kg)  SpO2 99%  BMI 31.93 kg/m2        General Appearance:    Alert, cooperative, no distress, appears stated age   Eyes:   No scleral icterus or conjunctival irritation       Throat:   Lips, mucosa, and tongue normal; teeth and gums normal   Neck:   Supple, symmetrical, trachea midline, no adenopathy;        thyroid:  No enlargement/tenderness/nodules   Lungs:     Clear to auscultation bilaterally, respirations unlabored, no wheezes or crackles   Heart:    Regular rate and rhythm,  no murmur, rub   or gallop, just incision seems to be healing  without any signs of dehiscence infection or other problems.   Abdomen:     Soft, non-tender, bowel sounds active,     no masses, no organomegaly incisions in the upper abdomen are healing without any signs of problem   Extremities:   Extremities normal, atraumatic, no cyanosis or edema   Skin:   Skin color, texture, turgor normal, no rashes or lesions   Neurologic:   Normal strength and sensation        throughout on gross examination.

## 2021-06-18 NOTE — ANESTHESIA PROCEDURE NOTES
Arterial Line  Reason for Procedure: hemodynamic monitoring  Patient location during procedure: OR pre-induction  Start time: 5/17/2018 7:01 AM  End time: 5/17/2018 7:08 AM  Staffing:  Performing  Anesthesiologist: MABEL FARIAS  Performing CRNA: LORENZO POLANCO  Sterile Precautions:  sterile barriers used during insertion: cap, mask, sterile gloves, large sheet, and hand hygiene used.  Arterial Line:     Laterality: left  Location: brachial  Prepped with: ChloroPrep    Needle gauge: 20 G  Number of Attempts: 1  Secured with: tape, transparent dressing and pressure dressing  Flushed with: saline  1% lidocaine local anesthesia used for skin prep.   See MAR for additional medications given.  Ultrasound evaluation of access site: yes  Vessel patent by US exam    Concurrent real time visualization of needle entry

## 2021-06-18 NOTE — PROGRESS NOTES
"Sachin Berry  1950  [id number]    Reason for visit: Sachin Berry is 68 y.o. year old male seen in clinic for routine follow-up after cardiac surgery. Hospital course was complicated by renal failure and Renal was consulted. Most recent creatinine on 6/11 was 3.39 . Patient was discharged to Home.    Procedure CABG  DOS 5/18/2018  Day of discharge: June 5, 2018  Discharge to Home    Readmissions: Yes  If yes, reason for readmission- weakness and hypotension and worsening renal failure with a creatinine of 4.7 and BUN of 85. He was admitted for IVF hydration and was discharge to home after 5 days.     Followed up with Brandan Arndt MD - was seen  Follow up with Cardiology scheduled for Jul 19 - Dr. Wong   Cardiac Rehab- [facility]    Assessment  Exam  /60 (Patient Site: Left Arm, Patient Position: Sitting, Cuff Size: Adult Large)  Pulse (!) 104  Resp 18  Ht 6' 2\" (1.88 m)  Wt (!) 237 lb (107.5 kg)  BMI 30.43 kg/m2  Gen: Alert, oriented, pleasant, no acute distress  CV: RRR without murmur or rub, no appreciable edema  Lungs: CTAB, non-labored breathing on room air  GI: Abdomen soft, non-tender, non-distended, normal bowel function  Sternum is stable no movement  Incisions: Chest and leg incisions well healing; C/D/I without erythema, purulence, swelling    Appetite: poor but improving, no nausea or vomiting   Bowels: constipation-   Weight: below baseline  Not using any pain medications.       Sachin Berry is a 68 y.o. year old male status post CABG who returns to clinic for post-op visit. He appears well describing an overall improvement. Denies any fevers, nausea or vomiting. Denies discomfort that requires pain medication.   He does however describe a sensation of significant  light headedness with urination, denies true syncope. HR today is regular rhythm but tachycardiac which he has been, of note Beta blocker not started in hospital due to listed as allergy - hives.   He has some " "problems with constipation. Discussed bowel routine medications.       PLAN  1. Surgically doing well. Incisions are healing well with no signs of infection. Sternum is stable.  2. Vital signs are stable-  HR has been SR-    3. Follow-up with cardiology as scheduled with Dr. Wong.  4. Continue cardiac rehab until completed.  5. Continue sternal precautions until   6. Okay to start driving June 18, 2018   7. No need for further follow-up with CV surgery unless concerns.   8. Constipation- try Miralax - daily  and colace one capsule 1-2 times a day.     Reviewed with patient:  Vital signs  /60 (Patient Site: Left Arm, Patient Position: Sitting, Cuff Size: Adult Large)  Pulse (!) 104  Resp 18  Ht 6' 2\" (1.88 m)  Wt (!) 237 lb (107.5 kg)  BMI 30.43 kg/m2    Medications- added bowel routine     Return to work: retired   Okay to drive - June 18, 2018   Continue sternal precaution for 12 weeks from date of surgery- August 18, 2018   Further follow up with Dr. Arndt to discuss hypertension medications and weakness noted with voiding.   Follow up with Renal tomorrow- June 21 - Dr. Gómez       "

## 2021-06-18 NOTE — ANESTHESIA PROCEDURE NOTES
MORE    Patient location during procedure: OR  Start time: 5/17/2018 7:32 AM  Staffing:  Performing  Anesthesiologist: MABEL FARIAS  MORE:  Type/Reason: Monitoring MORE  Technique: blind insertion  Difficulty: easy  Anesthesia Monitoring: see additional note

## 2021-06-18 NOTE — PROGRESS NOTES
"Cardiac Rehab  Phase II Assessment    Assessment Date: 6/15/18    Diagnosis: CAD  Date of Onset: 5/2018  Procedure: CABG x 4  Date of Onset: 5/17/18  ICD/Pacemaker: No Parameters: none  Post-op Complications: hypotensive, dehydrated (readmitted to hosp)  ECG History: SR/ST with 1'AVB EF%:\"normal\" per echo 5/17/18  Past Medical History:  has a past medical history of CAD (coronary artery disease); Chronic Kidney Disease, Stage 3; Diabetic Peripheral Neuropathy; Gout; Heart murmur; [place] (hard of hearing); Hyperlipidemia; Hypertension; Neck rigidity; Rotator cuff tendonitis; Sleep apnea; Sleep disorder; and Type 2 Diabetes Mellitus.; smoked cigar 1-2x/yr; frozen neck and shoulders         Past Surgical History:   Procedure Laterality Date     CORONARY ARTERY BYPASS GRAFT N/A 5/17/2018    Procedure: CORONARY ARTERY BYPASS x 4, LEFT LEG ENDOSCOPIC VEIN HARVEST, LEFT INTERNAL MAMMARY ARTERY,EPI-AORTIC ULTRASOUND,  ANESTHESIA TRANSESOPHAGEAL ECHOCARDIOGRAM;  Surgeon: Meredith Peters MD;  Location: [facility] Main OR;  Service:      CV CORONARY ANGIOGRAM N/A 5/9/2018    Procedure: Coronary Angiogram;  Surgeon: Inderjit Bonilla MD;  Location: [facility] Cath Lab;  Service:      HERNIA REPAIR      umbilical     KNEE ARTHROSCOPY Right      SC LAP,CHOLECYSTECTOMY      Description: Cholecystectomy Laparoscopic;  Recorded: 03/16/2008;       Physical Assessment  Precautions/ Physical Limitations: surgical  Oxygen: No  O2 Sats: 99 Lung Sounds: clear Edema: none  Incisions: clean/dry/intact  Sleeping Pattern: fair   Appetite: fair   Nutrition Risk Screen: Completed.    Pain  Location: incisional  Characteristics:ache  Intensity: (0-10 scale) 1  Current Pain Management: rest  Intervention: rest  Response: decreases to 0/10    Psychosocial/ Emotional Health  1. In the past 12 months, have you been in a relationship where you have been abused physically, emotionally, sexually or financially? No  notified: NA  2. Who " do you turn to for emotional support?: wife  3. Do you have cultural or spiritual needs? No  4. Have there been any major life changes in the past 12 months? No    Referral Information  Primary Physician: Brandan Arndt MD  Cardiologist: Dr. Ricarda Wong  Surgeon: Dr. Meredith Peters    Home exercise/Equipment: none    Patient's long-term goal(s): Return to pre-surgical activities.    1. Living Accommodations: Home Steps: Yes (no steps in house, [place]      Support people at home: wife   2. Marital Status:   3. Family is able to assist with cares      Zoroastrian/Community involvement: none  4. Recreation/Hobbies: Fishing

## 2021-06-19 NOTE — PROGRESS NOTES
ITP ASSESSMENT   Assessment Day: 60 Day  Session Number: 12  Precautions: Surgical  Diagnosis: CAB  Risk Stratification: High  Referring Provider: Dr. Peters  ITP sent to Dr. Almodovar  EXERCISE  Exercise Assessment: Reassessment                            Exercise Plan  Goals Next 30 days  ADL'S: Resume fishing.  Leisure: Resume stairs at cabin (42 steps) with less fatigue  Work: Retired    Education Goals: Patient can state cardiac s/s and appropriate emergency response.  Education Goals Met: Has system for taking medication.;Medication review.                        Goals Met  30 day ADL'S goals met: Pt has resumed walking 2-4x/week for 10-15 mins.  30 day Leisure goals met: Pt has resumed boating.  30 day Work goals met: Retired    Initial ADL's goals met: Pt has resumed driving.  Initial Leisure goals met: Pt is walking 2-3x/week for 5-10 mins.  Intial Work goals met: Retired    Exercise Prescription  Exercise Mode: Treadmill;Bike;Nustep;Hallway Walking;Arm Erg.  Frequency: 2x/week  Duration: 30-45 mins  Intensity / THR: 20-30 beats above resting heart rate  RPE 11-14  Progression / Met level: 3-4  Resistive Training?: Yes    Current Exercise (mins/week): 110    Interventions  Home Exercise:  Mode: Walking  Frequency: 2-4x/week  Duration: 15-20 mins    Education Material : Educational videos;Provide written material;Individual education and counseling;Offer educational classes    Education Completed  Exercise Education Completed: Cardiac Anatomy;Signs and Symptoms;Medication review;RPE;Emergency Plan;Home Exercise;BP/HR Reponse to exercise;Benefits of Exercise;End point of exercise            Exercise Follow-up/Discharge  Follow up/Discharge: Pt has reached peak of 2.8 METs for 25 mins on Nustep.  Pt walking at home, and doing a lot of steps at his cabin- encouraged to find indoor place to walk for upcoming winter. NUTRITION  Nutrition Assessment: Reassessment    Nutrition Risk Factors:  Nutrition Risk Factors:  "Diabetes;Dyslipidemia;Overweight  Monitors blood sugar at home: Yes  Frequency: 3-4x/day    Nutrition Plan  Interventions  Diet Consult: Declined  Other Nutrition Intervention: Diet Class;Therapist/Pt Discussion;Educational Videos;Provide with Written Material    Education Completed  Nutrition Education Completed: Risk factor overview    Goals  Nutrition Goals (Next 30 days): Patient can identify their risk factors for CAD;Patient will follow a low sodium diet;Patient will follow a low saturated fat diet;Patient knows appropriate portion size;Patient will lose weight;Improve Rate Your Plate Survey Score    Goals Met  Nutrition Goals Met: Completed Nutritional Risk Screen;Provided Rate your Plate Survey;Reviewed Dietitian schedule    Height, Weight, and  BMI  Weight: 241 lb (109.3 kg)  Height: 6' 2\" (1.88 m)  BMI: 30.93    Nutrition Follow-up  Follow-up/Discharge: Pt declines dietician again.  He states he is comfortable with where his weight is at right now (235-240 lbs).  He plans to see a dietician at his PMD clinic soon.  He is not paying real close attention to his salt intake, but was encouraged to do so.       Other Risk Factors  Other Risk Factor Assessment: Reassessment    HTN Risk Factor: Hypertension    Pre Exercise BP: 126/60  Post Exercise BP: 128/60    Hypertension Plan  Goals  HTN Goals: Follow low sodium diet;Exercises regularly    Goals Met  HTN Goals Met: Take medication as prescribed    HTN Interventions  HTN Interventions: Diet consult;Therapist/patient discussion;Provide written material;Offer educational videos;Offer educational classes    HTN Education Completed  HTN Education Completed: Medication review;Risk factor overview    Tobacco Risk Factor: Tobacco    Initial Use:: none  Current Use:: none    Tobacco Plan  Tobacco Goals  Tobacco Goals: Patient remain tobacco free    Goals Met  Tobacco Goals Met: Patient remain tobacco free    Tobacco Interventions  Tobacco Interventions: " Therapist/patient discussion    Tobacco Education Completed  Tobacco Education Completed: Risk factor overview    Risk Factor Follow-up   Follow-up/Discharge: Pt quit smoking cigars 2 yrs ago.   PSYCHOSOCIAL  Psychosocial Assessment: Reassessment       Psychosocial Risk Factor: Stress    Psychosocial Plan  Interventions  Interventions: Offer educational videos and classes;Provide written material;Individual education and counseling    Education Completed  Education Completed: Relaxation/Coping Techniques    Goals  Goals (Next 30 days): Improvement in [employer] COOP score;Practicing stress management skills    Goals Met  Goals Met: Identified Support system;Oriented to stress management classes;Identify stressors    Psychosocial Follow-up  Follow-up/Discharge: Pt states his stress level has been okay.  He helps manages stress through cabin activities (boating) and woodworking.  Pt has a good support system.           Patient involved in Goal setting?: Yes    Signature: _____________________________________________________________    Date: __________________    Time: __________________

## 2021-06-19 NOTE — PROGRESS NOTES
ITP ASSESSMENT   Assessment Day: 30 Day  Session Number: 7  Precautions: Surgical  Diagnosis: CAB  Risk Stratification: High  Referring Provider: Dr. Meredith Peters  ITP sent to Dr. Alfonso Almodovar  EXERCISE  Exercise Assessment: Reassessment                              Exercise Plan  Goals Next 30 days  ADL'S: Walking 2-4x/week for 10-15 mins.  Leisure[place]  Work: Retired    Education Goals: Patient can state cardiac s/s and appropriate emergency response.  Education Goals Met: Has system for taking medication.;Medication review.                        Goals Met  Initial ADL's goals met: Pt has resumed driving.  Initial Leisure goals met: Pt is walking 2-3x/week for 5-10 mins.  Intial Work goals met: Retired  No Data Recorded    Exercise Prescription  Exercise Mode: Treadmill;Bike;Nustep;Hallway Walking;Arm Erg.  Frequency: 2x/week  Duration: 30-40 mins  Intensity / THR: 20-30 beats above resting heart rate  RPE 11-14  Progression / Met level: 2.6-3  Resistive Training?: Yes    Current Exercise (mins/week): 90    Interventions  Home Exercise:  Mode: Walking  Frequency: 2-4x/week  Duration: 10-15 mins    Education Material : Educational videos;Provide written material;Individual education and counseling;Offer educational classes    Education Completed  Exercise Education Completed: Cardiac Anatomy;Signs and Symptoms;Medication review;RPE;Emergency Plan;Home Exercise;BP/HR Reponse to exercise;Benefits of Exercise;End point of exercise            Exercise Follow-up/Discharge  Follow up/Discharge: Pt has reached peak of 2.5 METs on Nustep for 25 mins.         NUTRITION  Nutrition Assessment: Reassessment    Nutrition Risk Factors:  Nutrition Risk Factors: Diabetes;Dyslipidemia;Overweight  HbA1c: 7.3 (4/17/18)  Monitors blood sugar at home: Yes  Frequency: 2x/day  Cholesterol: 111 (5/11/18)  LDL: 47  HDL: 25  Triglycerides: 197    Nutrition Plan  Interventions  Diet Consult: Declined  Other Nutrition  "Intervention: Diet Class;Therapist/Pt Discussion;Educational Videos;Provide with Written Material    Education Completed  Nutrition Education Completed: Risk factor overview    Goals  Nutrition Goals (Next 30 days): Patient can identify their risk factors for CAD;Patient will follow a low sodium diet;Patient will follow a low saturated fat diet;Patient knows appropriate portion size;Patient will lose weight;Improve Rate Your Plate Survey Score    Goals Met  Nutrition Goals Met: Completed Nutritional Risk Screen;Provided Rate your Plate Survey;Reviewed Dietitian schedule    Height, Weight, and  BMI  Weight: 239 lb (108.4 kg)  Height: 6' 2\" (1.88 m)  BMI: 30.67    Nutrition Follow-up  Follow-up/Discharge: Pt declines diet consult, as they have talked to a dietician through their MDs office in the past.  Pt states he is following a heart-healthy diet.  He states his weight he wants to keep his weight in a 235-240 lb range for right now.  Pt weighed 280 lbs 2 years ago.     Other Risk Factors  Other Risk Factor Assessment: Reassessment    HTN Risk Factor: Hypertension    Pre Exercise BP: 144/74  Post Exercise BP: 124/74    Hypertension Plan  Goals  HTN Goals: Follow low sodium diet;Exercises regularly    Goals Met  HTN Goals Met: Take medication as prescribed    HTN Interventions  HTN Interventions: Diet consult;Therapist/patient discussion;Provide written material;Offer educational videos;Offer educational classes    HTN Education Completed  HTN Education Completed: Medication review;Risk factor overview    Tobacco Risk Factor: Tobacco    Initial Use:: none  Current Use:: none    Tobacco Plan  Tobacco Goals  Tobacco Goals: Patient remain tobacco free    Goals Met  Tobacco Goals Met: Patient remain tobacco free    Tobacco Interventions  Tobacco Interventions: Therapist/patient discussion    Tobacco Education Completed  Tobacco Education Completed: Risk factor overview    Risk Factor Follow-up   Follow-up/Discharge: Pt " rarely smokes cigars, but quit that 2 years ago.       PSYCHOSOCIAL  Psychosocial Assessment: Reassessment       Psychosocial Risk Factor: Stress    Psychosocial Plan  Interventions  Interventions: Offer educational videos and classes;Provide written material;Individual education and counseling    Education Completed  Education Completed: Relaxation/Coping Techniques    Goals  Goals (Next 30 days): Improvement in [employer] CO[employer] score;Practicing stress management skills    Goals Met  Goals Met: Identified Support system;Oriented to stress management classes;Identify stressors    Psychosocial Follow-up  Follow-up/Discharge: Pt states his stress level has decreased as he has felt better physically, and resumed more activities.         Patient involved in Goal setting?: Yes    Signature: _____________________________________________________________    Date: __________________    Time: __________________

## 2021-06-19 NOTE — PROGRESS NOTES
Sachin RAMON Berry has participated in 12 sessions of Phase II Cardiac Rehab.  Info being sent to pt's PMD Dr. Arndt because pt and rehab staff concerned about BPs.    Progress Report:   Cardiac Rehab Treatment Progress Report 7/24/2018 7/26/2018 7/31/2018 8/2/2018 8/7/2018   Weight 240 lbs 242 lbs 242 lbs 242 lbs 241 lbs   Pre Exercise  HR 98 90 97 94 97   Pre Exercise /80 152/72 144/68 152/68 126/60   Pre Blood Sugar (mg/dl) 167  141 147 168 147   Treadmill Peak  121 124 - -   Nustep Peak Heart Rate 121 101 107 111-113 108   Nustep Peak Blood Pressure 198/90 196/90 174/72 202/70 180/70   Heart Rate 103 85 99 99 97   Post Exercise /80 154/80 132/70 140/60 128/60   Post Blood Sugar (mg/dl) 109 113 106 148 141   ECG SR/1'AVB, occ PVC's SR/1'AVB SR/1'AVB with rare PVCs SR/ST/1'AVB SR/1'AVB   Total Exercise Minutes 43 40 43 27 38         Current Status:  Currently exercising without complaints or symptoms. and Therapists Comments: Pt's exercise BPs have been elevated for last several sessions.  Pt's goal is 5-5.5 METs.  He has been encouraged to walk more regularly at home.    If Physician recommends change in treatment plan, please place orders.        __________________________________________________      _____________  Signature                                                                                                  Date

## 2021-06-20 NOTE — PROGRESS NOTES
ITP ASSESSMENT   Assessment Day: 120 Day/Last Day  Session Number: 26  Precautions: Surgical  Diagnosis: CAB  Risk Stratification: High  Referring Provider: Brandan Arndt MD   ITP: Dr. Almodovar  EXERCISE  Exercise Assessment: Discharge     6 Minute Walk Test   Pre   Pre Exercise HR: 91                  Pre Exercise BP: 152/80    Peak  Peak HR: 124                 Peak BP: 176/70  Peak feet: 1500  Peak O2 SAT: 97  Peak RPE: 14  Peak MPH: 2.84    Symptoms:  Peak Symptoms: Bilateral hip pain 4-5/10    5 mins. Post  5 Min Post HR: 93 (post )  5 Min Post BP: 160/78                         Exercise Plan    Education Goals: All goals in this section met  Education Goals Met: Patient can state cardiac s/s and appropriate emergency response.;Has system for taking medication.;Medication review.                        Goals Met  90 day ADL'S goals met: Pt did not do any fishing this year.  90 day Leisure goals met: Pt only able to walk 10-15 min. at a time due to hip pain  90 day Work goals met: Retired  90 Day Progress: Pt has progressed to 2.7 MET level on nustep, 5 MET level on stairts    60 day ADL'S goals met: Pt has not resumed fishing  60 day Leisure goals met: Pt has resumed climbing stairs with less fatigue  60 day Work goals met: Retired  60 Day Progression: Pt has progressed to 4.7 METs on stairs    30 day ADL'S goals met: Pt has resumed walking 2-4x/week for 10-15 mins.  30 day Leisure goals met: Pt has resumed boating.  30 day Work goals met: Retired    Initial ADL's goals met: Pt has resumed driving.  Initial Leisure goals met: Pt is walking 2-3x/week for 5-10 mins.  Intial Work goals met: Retired      Current Exercise (mins/week): 180    Interventions  Home Exercise:  Mode: walking  Frequency: 4-5x/week  Duration: 10-15 min, 2-3x/day    Education Material : Educational videos;Provide written material;Individual education and counseling;Offer educational classes    Education Completed  Exercise  "Education Completed: Cardiac Anatomy;Signs and Symptoms;Medication review;RPE;Emergency Plan;Home Exercise;BP/HR Reponse to exercise;Benefits of Exercise;End point of exercise;Warm up/cool down;FITT Principles            Exercise Follow-up/Discharge  Follow up/Discharge: Pt has reached 4.7 METs on the stairs and 3 METs on the Nustep. Pt states climbing stairs at cabin is much easier compared to a month ago. Encouraged to continue HEP. NUTRITION  Nutrition Assessment: Discharge    Nutrition Risk Factors:  Nutrition Risk Factors: Diabetes;Dyslipidemia;Overweight  Monitors blood sugar at home: Yes  Frequency: 3-4x/day    Nutrition Plan  Interventions  Diet Consult: Declined  Other Nutrition Intervention: Diet Class;Therapist/Pt Discussion;Provide with Written Material    Education Completed  Nutrition Education Completed: Risk factor overview;Low Saturated fat diet;Low sodium diet    Goals Met  Nutrition Goals Met: Completed Nutritional Risk Screen;Patient follows a low sodium diet;Patient states following a low saturated fat diet    Height, Weight, and  BMI  Weight: 242 lb (109.8 kg)  Height: 6' 2\" (1.88 m)  BMI: 31.06    Nutrition Follow-up  Follow-up/Discharge: Pt reports following a low fat and low sodium diet. Pt states he is comfortable with the weight he is at.       Other Risk Factors  Other Risk Factor Assessment: Discharge    HTN Risk Factor: Hypertension    Pre Exercise BP: 152/80  Post Exercise BP: 138/62    Hypertension Plan    Goals Met  HTN Goals Met: Follow low sodium diet;Take medication as prescribed;Exercises regularly    HTN Interventions  HTN Interventions: Diet consult;Therapist/patient discussion;Provide written material;Offer educational videos;Offer educational classes    HTN Education Completed  HTN Education Completed: Medication review;Low sodium diet;Risk factor overview    Tobacco Risk Factor: Tobacco    Initial Use:: none  Current Use:: none    Tobacco Plan  Goals Met  Tobacco Goals Met: " Patient remain tobacco free    Tobacco Interventions  Tobacco Interventions: Therapist/patient discussion    Tobacco Education Completed  Tobacco Education Completed: Risk factor overview    Risk Factor Follow-up   Follow-up/Discharge: Pt quit smoking cigars 2 years ago   PSYCHOSOCIAL  Psychosocial Assessment: Discharge     [place] COOP Q of L Summary Score: 16    ASIM-D Score: 13    Psychosocial Risk Factor: Stress    Psychosocial Plan  Interventions  Interventions: Offer educational videos and classes;Provide written material;Individual education and counseling    Education Completed  Education Completed: Effects of stress on body;Relaxation/Coping Techniques    Goals Met  Goals Met: Identified Support system;Identify stressors;Practicing stress management skills;Improvement in [employer] COOP score    Psychosocial Follow-up  Follow-up/Discharge: Pt states his stress level has been low. Enjoys playing with his dogs, woodworking, and reading to relax.           Patient involved in Goal setting?: Yes.  Pt ready for d/c.  Pt improved from 200' walk to 1500' walk with 6 min walk  Pleased with progress.  Pt verbalized good understanding of home exercise program instructions.    Signature: _____________________________________________________________    Date: __________________    Time: __________________

## 2021-06-20 NOTE — PROGRESS NOTES
[facility] Home Exercise Program/Discharge Summary  You have reached a 2.7-5 MET level and have completed 26 sessions of Cardiac Rehab.   Exercise Goals:   4-6x/week for aerobic exercise 30-60 minutes and 2-3x/week for strength training.   Modality Duration Intensity/  Rate of Perceived Exertion  (RPE: 11-14)   Warm-up 2-3 minutes 8-10   Walk 10-15 minutes, 2-3x/day 11-14   Cool Down 2-3 minutes 8-10   Heart Rate Guidelines:   20-30bpm> RHR (Resting Heart Rate)   Special Recommendations:    Continue to follow low fat, low salt, heart healthy diet.    Continue to follow up with your doctors/providers as recommended (e.g cholesterol).    A well rounded exercise program will included aerobic/cardiovascular exercise (e.g like walking, biking, or swimming ), strength training (e.g. free weights, exercise bands, or weight machines) and stretching program.   Stop Exercise!!! If any of the following occur:    Angina/chest pain    Dizziness    Excessive perspiration/cold sweats    Abnormal shortness of breath    Changes in heart rate (slow, fast, irregular)    Sudden fatigue or numbness    Nausea  Also...    Avoid extreme temperatures - exercise indoors if necessary:   Temp+ Humidity >160, Temp-Wind Chill <20    Wait at least 1 hour after a meal before strenuous activity    Do not exercise if you have a fever or are ill    Wear comfortable, supportive athletic clothing and shoes.  You are now on your way to a heart healthy lifestyle on your own. You can do it!

## 2021-06-20 NOTE — PROGRESS NOTES
ITP ASSESSMENT   Assessment Day: Initial session  Session Number: 1  Precautions: Surgical  Diagnosis: CAB  Risk Stratification: High  Referring Provider: Dr. Peters   ITP sent to Dr. Almodovar  EXERCISE  Exercise Assessment: Discharge     6 Minute Walk Test   Pre   Pre Exercise HR: 108                  Pre Exercise BP: 146/70    Peak  Peak HR: 121                 Peak BP: 160/74  Peak feet: 200  Peak O2 SAT: 99  Peak RPE: 13  Peak MPH: 0.38    Symptoms:  Peak Symptoms: shortness of breath, fatigue    5 mins. Post  5 Min Post HR: 99   5 Min Post BP: 130/70                         Exercise Plan  Goals Next 30 days  ADL'S: Resume driving  Leisure: Walk 2-3x/week for 5-10 mins  Work: Retired      Education Goals: All goals in this section met  Education Goals Met: Patient can state cardiac s/s and appropriate emergency response.;Has system for taking medication.;Medication review.    Exercise Prescription  Exercise Mode: Treadmill;Bike;Nustep;Hallway Walking;  Frequency: 2x/week  Duration: 30-45 min  Intensity / THR: 20-30 beats above resting heart rate  RPE 11-14  Progression / Met level: 2-2.3  Resistive Training?: No    Current Exercise (mins/week): 20    Interventions  Home Exercise:  Mode: walking  Frequency: 2-3x/week  Duration: 5-10 mins    Education Material : Educational videos;Provide written material;Individual education and counseling;Offer educational classes    Education Completed  Exercise Education Completed: Cardiac Anatomy;Signs and Symptoms;Medication review;RPE;Emergency Plan;Home Exercise;BP/HR Reponse to exercise;Benefits of Exercise;End point of exercise;Warm up/cool down;FITT Principles             NUTRITION  Nutrition Assessment: Discharge    Nutrition Risk Factors:  Nutrition Risk Factors: Diabetes;Dyslipidemia;Overweight  Monitors blood sugar at home: Yes  Frequency: 2-3x/day    Nutrition Plan  Interventions  Diet Consult: Declined  Other Nutrition Intervention: Diet Class;Therapist/Pt  "Discussion;Provide with Written Material    Education Completed  Nutrition Education Completed: Risk factor overview;Low Saturated fat diet;Low sodium diet    Goals  Nutrition Goals (Next 30 days): Patient can identify their risk factors for CAD;Patient knows appropriate portion size    Goals Met  Nutrition Goals Met: Completed Nutritional Risk Screen;Patient follows a low sodium diet;Patient states following a low saturated fat diet    Height, Weight, and  BMI  Weight: 242 lb (109.8 kg)  Height: 6' 2\" (1.88 m)  BMI: 31.06         Other Risk Factors  Other Risk Factor Assessment: Discharge    HTN Risk Factor: Hypertension    Pre Exercise BP: 146/70  Post Exercise BP: 130/70    Hypertension Plan  Goals  HTN Goals: Patient demonstrates understanding of HTN, no goals identified for the next 30 days    Goals Met  HTN Goals Met: Follow low sodium diet;Take medication as prescribed;Exercises regularly    HTN Interventions  HTN Interventions: Diet consult;Therapist/patient discussion;Provide written material;Offer educational videos;Offer educational classes    HTN Education Completed  HTN Education Completed: Medication review;Low sodium diet;Risk factor overview    Tobacco Risk Factor: Tobacco    Initial Use:: none  Current Use:: none    Tobacco Plan  Tobacco Goals  Tobacco Goals: Patient remain tobacco free    Goals Met  Tobacco Goals Met: Patient remain tobacco free    Tobacco Interventions  Tobacco Interventions: Therapist/patient discussion    Tobacco Education Completed  Tobacco Education Completed: Risk factor overview    Risk Factor Follow-up   Follow-up/Discharge: Pt quit smoking cigars 2 years ago   PSYCHOSOCIAL  Psychosocial Assessment: Discharge     [facility] Q [facility] L Summary Score: 25    ASIM-D Score: 30    Psychosocial Risk Factor: Stress    Psychosocial Plan  Interventions  Mental Health letter sent to pt's PMD Dr. Arndt.  Interventions: Offer educational videos and classes;Provide written " material;Individual education and counseling    Education Completed  Education Completed: Effects of stress on body;Relaxation/Coping Techniques    Goals  Goals (Next 30 days): Improvement in [employer] COOP score    Goals Met  Goals Met: Identified Support system;Identify stressors;Practicing stress management skills;Improvement in [employer] COOP score    Psychosocial Follow-up  Follow-up/Discharge: Pt states his stress level has been low. Enjoys playing with his dogs, woodworking, and reading to relax.           Patient involved in Goal setting?: Yes    Signature: _____________________________________________________________    Date: __________________    Time: __________________

## 2021-06-20 NOTE — PROGRESS NOTES
ITP ASSESSMENT   Assessment Day: 90 Day  Session Number: 19  Precautions: Surgical  Diagnosis: CAB  Risk Stratification: High  Referring Provider: Meredith Peters MD  EXERCISE  Exercise Assessment: Reassessment                         Exercise Plan  Goals Next 30 days  ADL'S: Resume fishing  Leisure: Resume walking longer durations, 30-40', w/o fatigue and SOB   Work: Retired    Education Goals: All goals in this section met  Education Goals Met: Patient can state cardiac s/s and appropriate emergency response.;Has system for taking medication.;Medication review.                        Goals Met  60 day ADL'S goals met: Pt has not resumed fishing  60 day Leisure goals met: Pt has resumed climbing stairs with less fatigue  60 day Work goals met: Retired  60 Day Progression: Pt has progressed to 4.7 METs on stairs    30 day ADL'S goals met: Pt has resumed walking 2-4x/week for 10-15 mins.  30 day Leisure goals met: Pt has resumed boating.  30 day Work goals met: Retired  No Data Recorded    Initial ADL's goals met: Pt has resumed driving.  Initial Leisure goals met: Pt is walking 2-3x/week for 5-10 mins.  Intial Work goals met: Retired  No Data Recorded    Exercise Prescription  Exercise Mode: Treadmill;Bike;Nustep;Hallway Walking;Arm Erg.  Frequency: 2x/week  Duration: 30-45 min  Intensity / THR: 20-30 beats above resting heart rate  RPE 11-14  Progression / Met level: 4.7-5.7  Resistive Training?: Yes    Current Exercise (mins/week): 180    Interventions  Home Exercise:  Mode: Walking  Frequency: 2-4x/week  Duration: 30-40 min    Education Material : Educational videos;Provide written material;Individual education and counseling;Offer educational classes    Education Completed  Exercise Education Completed: Cardiac Anatomy;Signs and Symptoms;Medication review;RPE;Emergency Plan;Home Exercise;BP/HR Reponse to exercise;Benefits of Exercise;End point of exercise;Warm up/cool down;FITT Principles            Exercise  "Follow-up/Discharge  Follow up/Discharge: Pt has reached 4.7 METs on the stairs and 3 METs on the Nustep. Pt states climbing stairs at cabin is much easier compared to a month ago. Encouraged to continue HEP. NUTRITION  Nutrition Assessment: Reassessment    Nutrition Risk Factors:  Nutrition Risk Factors: Diabetes;Dyslipidemia;Overweight  Monitors blood sugar at home: Yes  Frequency: 3-4x/day    Nutrition Plan  Interventions  Diet Consult: Declined  Other Nutrition Intervention: Diet Class;Therapist/Pt Discussion;Provide with Written Material    Education Completed  Nutrition Education Completed: Risk factor overview;Low Saturated fat diet;Low sodium diet    Goals  Nutrition Goals (Next 30 days): Patient can identify their risk factors for CAD;Patient knows appropriate portion size    Goals Met  Nutrition Goals Met: Completed Nutritional Risk Screen;Patient follows a low sodium diet;Patient states following a low saturated fat diet    Height, Weight, and  BMI  Weight: 243 lb (110.2 kg)  Height: 6' 2\" (1.88 m)  BMI: 31.19    Nutrition Follow-up  Follow-up/Discharge: Pt reports following a low fat and low sodium diet. Pt states he is comfortable with the weight he is at.       Other Risk Factors  Other Risk Factor Assessment: Reassessment    HTN Risk Factor: Hypertension    Pre Exercise BP: 142/72  Post Exercise BP: 130/78    Hypertension Plan  Goals  HTN Goals: Patient demonstrates understanding of HTN, no goals identified for the next 30 days    Goals Met  HTN Goals Met: Follow low sodium diet;Take medication as prescribed;Exercises regularly    HTN Interventions  HTN Interventions: Diet consult;Therapist/patient discussion;Provide written material;Offer educational videos;Offer educational classes    HTN Education Completed  HTN Education Completed: Medication review;Low sodium diet;Risk factor overview    Tobacco Risk Factor: Tobacco    Initial Use:: none  Current Use:: none    Tobacco Plan  Tobacco Goals  Tobacco " Goals: Patient remain tobacco free    Goals Met  Tobacco Goals Met: Patient remain tobacco free    Tobacco Interventions  Tobacco Interventions: Therapist/patient discussion    Tobacco Education Completed  Tobacco Education Completed: Risk factor overview    Risk Factor Follow-up   Follow-up/Discharge: Pt quit smoking cigars 2 years ago PSYCHOSOCIAL  Psychosocial Assessment: Reassessment     Psychosocial Risk Factor: Stress    Psychosocial Plan  Interventions  Interventions: Offer educational videos and classes;Provide written material;Individual education and counseling    Education Completed  Education Completed: Effects of stress on body;Relaxation/Coping Techniques    Goals  Goals (Next 30 days): Improvement in [employer] COOP score    Goals Met  Goals Met: Identified Support system;Identify stressors;Practicing stress management skills    Psychosocial Follow-up  Follow-up/Discharge: Pt states his stress level has been low. Enjoys playing with his dogs, woodworking, and reading to relax.           Patient involved in Goal setting?: Yes    Signature: _____________________________________________________________    Date: __________________    Time: __________________

## 2021-06-20 NOTE — PROGRESS NOTES
Sachin Berry has participated in 25 sessions of Phase II Cardiac Rehab.    Progress Report: Dr. Wong  Cardiac Rehab Treatment Progress Report 9/11/2018 9/13/2018 9/18/2018 9/20/2018 9/25/2018   Weight 243 lbs 243 lbs 240 lbs 242 lbs 242 lbs   Pre Exercise  HR 97 93 91 87 96   Pre Exercise /74 162/70 176/80 132/74 144/68   Pre Blood Sugar (mg/dl) 144 152 116 140 126   Treadmill Peak HR - - - - -   Nustep Peak Heart Rate 114 112 115 112 108   Nustep Peak Blood Pressure 168/60 180/70 200/78 226/92 180/72   Heart Rate 96 97 96 94 89   Post Exercise /60 130/64 162/78 140/72 138/62   Post Blood Sugar (mg/dl) 142 81 124 78, 93 81, 78, 93   ECG SR/1'AVB with occas. PVCs and rare couplet. SR/1'AVB with rare PVC's SR/1'AVB SR/1'AVB SR/1'AVB   Total Exercise Minutes 40 35 40 40 30   Current Status:  Therapists Comments: Pt has been continously hypertensive at rest and now more hypertensive with exercise and we are having to keep levels lower.  I reviewed all medications with Fernando's daughter today. She is unsure if that he is taking the Hyzaar 100-25mg-not likely. They will need a Rxn if that is the case, if he should be taking that.  But they may need to be re-verified with wife Pete [phone] home number. Patient does have memory concerns so please talk with family. There is concern from family that he also has CKD Stage III and ? If it would affect his kidney's.    If Physician recommends change in treatment plan, please place orders.        __________________________________________________      _____________  Signature                                                                                                  Date

## 2021-06-22 NOTE — PROGRESS NOTES
Dear Brandan Arndt MD  [address] [facility] [address]  [place] [address],    Thank you for the opportunity to participate in the care of Sachin Berry.     He is a 68 y.o. y/o male patient who comes to the sleep medicine clinic for follow up after he completed a PSG.    We had an extensive conversation to review the results of his sleep study.    The overnight polysomnography was completed with a digital sleep system using the international 10-20 electrode placement for recording EEG, EOG, EMG from chin, ECG, respiratory effort, oximetry, body position, airflow, nasal pressure, snoring sound, pulse rate and limb movement channels.    The study was completed as an all-night diagnostic study.    1. Respiratory monitoring showed intermittent snoring and overall mild obstructive sleep apnea/hypopnea (AHI=10.1).  2. There was evidence of increased muscle tone during stage REM sleep that met criteria for REM sleep without atonia (RSWA) but this was observed in the setting of frequent respiratory events during stage REM sleep. No complex movements were observed in the video-recording.    We reviewed the oxygen saturation graph as well as the result tables from the report.    The patient denies any recent episodes of dream enactment behavior. We talked about his violent episodes again and his wife reports that most of the episodes of violence during sleep happened after the patient was awaken in the middle of the night. He did have episodes of sleepwalking in the past but he has not had a recent episode.     Past Medical History:   Diagnosis Date     CAD (coronary artery disease)      Chronic Kidney Disease, Stage 3     Created by Conversion      Diabetic Peripheral Neuropathy     Created by Adaptive Planning [facility] Annotation: Mar 16 2008  5:03PM - Brandan Arndt: MILD      Gout     resolved     Heart murmur      [place] (hard of hearing)      Hyperlipidemia     Created by Conversion      Hypertension     Created by  Conversion      Neck rigidity     fused, non surgical     Rotator cuff tendonitis      Sleep apnea     does not use his CPAP     Sleep disorder     sleep walking, can wake up angry if touched when sleeping or sleep walking     Type 2 Diabetes Mellitus     Created by Conversion        Past Surgical History:   Procedure Laterality Date     CORONARY ARTERY BYPASS GRAFT N/A 5/17/2018    Procedure: CORONARY ARTERY BYPASS x 4, LEFT LEG ENDOSCOPIC VEIN HARVEST, LEFT INTERNAL MAMMARY ARTERY,EPI-AORTIC ULTRASOUND,  ANESTHESIA TRANSESOPHAGEAL ECHOCARDIOGRAM;  Surgeon: Meredith Peters MD;  Location: [facility] OR;  Service:      CV CORONARY ANGIOGRAM N/A 5/9/2018    Procedure: Coronary Angiogram;  Surgeon: Inderjit Bonilla MD;  Location: [facility] Cath Lab;  Service:      HERNIA REPAIR      umbilical     KNEE ARTHROSCOPY Right      TX LAP,CHOLECYSTECTOMY      Description: Cholecystectomy Laparoscopic;  Recorded: 03/16/2008;       Social History     Socioeconomic History     Marital status:      Spouse name: Not on file     Number of children: Not on file     Years of education: Not on file     Highest education level: Not on file   Social Needs     Financial resource strain: Not on file     Food insecurity - worry: Not on file     Food insecurity - inability: Not on file     Transportation needs - medical: Not on file     Transportation needs - non-medical: Not on file   Occupational History     Not on file   Tobacco Use     Smoking status: Never Smoker     Smokeless tobacco: Never Used   Substance and Sexual Activity     Alcohol use: Yes     Comment: rarely     Drug use: No     Sexual activity: Not on file   Other Topics Concern     Not on file   Social History Narrative     Not on file       Family History   Problem Relation Age of Onset     Alzheimer's disease Mother      Chronic Kidney Disease Father      Breast cancer Sister          Review of Systems:  General: No weight gain, no weight loss  Eyes: No  "vision changes  ENT: No hearing changes  Cardio: No chest pain, no nocturnal dyspnea  Respiratory: No shortness of breath, no cough  Gastrointestinal: No diarrhea, no constipation  Genitourinary: No excessive urination  Tegumentary: No rashes  Neurological: No seizures, no loss of consciousness  Endo: No heat or cold intolerance.    Current Outpatient Medications   Medication Sig Dispense Refill     ACCU-CHEK SMARTVIEW TEST STRIP strips USE ONE STRIP WITH YOUR ACCU-CHEK JEANNIE METER TO CHECK BLOOD SUGARS 3 TIME(S) DAILY,DX CODE E11.9 300 strip 3     allopurinol (ZYLOPRIM) 100 MG tablet TAKE 1 TABLET (100 MG TOTAL) BY MOUTH DAILY. 90 tablet 2     aspirin 81 MG EC tablet Take 81 mg by mouth every evening.        atorvastatin (LIPITOR) 20 MG tablet Take 1 tablet (20 mg total) by mouth daily. 90 tablet 2     BD ULTRA-FINE SHORT PEN NEEDLE 31 gauge x 5/16\" Ndle USE AS DIRECTED 200 each 3     cyanocobalamin 1000 MCG tablet Take 1,000 mcg by mouth daily.       diltiazem (CARDIZEM CD) 300 MG 24 hr capsule Take 1 capsule (300 mg total) by mouth daily. 90 capsule 3     fenofibrate (TRICOR) 48 MG tablet Take 1 tablet (48 mg total) by mouth daily. 90 tablet 2     furosemide (LASIX) 40 MG tablet Take 1 tablet (40 mg total) by mouth daily. 90 tablet 3     glipiZIDE (GLUCOTROL) 5 MG tablet Take 0.5 tablets (2.5 mg total) by mouth 2 (two) times a day before meals. 1/2 Hour BEFORE meals 60 tablet 6     isosorbide mononitrate (IMDUR) 30 MG 24 hr tablet Take 30 mg by mouth daily.       LANTUS SOLOSTAR U-100 INSULIN 100 unit/mL (3 mL) pen INJECT 40 UNITS UNDER THE SKIN DAILY 15 adj dose pen 3     LANTUS U-100 INSULIN 100 unit/mL injection Inject 25 Units under the skin at bedtime. 10 mL PRN     losartan (COZAAR) 100 MG tablet Take 100 mg by mouth daily.        nitroglycerin (NITROSTAT) 0.4 MG SL tablet Place 1 tablet (0.4 mg total) under the tongue every 5 (five) minutes as needed for chest pain. 25 tablet 3     omega-3/dha/epa/fish oil " "(FISH OIL-OMEGA-3 FATTY ACIDS) 300-1,000 mg capsule Take 1 g by mouth daily.       No current facility-administered medications for this visit.        Allergies   Allergen Reactions     Blood-Group Specific Substance      Anti-E.  Allow additional time for obtaining blood for transfusion.      Amlodipine      Edema       Amoxicillin-Pot Clavulanate Hives     Indomethacin      Dyspepsia       Metoprolol Succinate Hives       Physical Exam:  Pulse (!) 110   Ht 6' 2\" (1.88 m)   Wt (!) 242 lb (109.8 kg)   SpO2 97%   BMI 31.07 kg/m    BMI:Body mass index is 31.07 kg/m .   GEN: NAD, obese  Neurological: Alert, oriented to time, place, and person.  Psych: normal mood, normal affect     Labs/Studies:  - We reviewed the results of the overnight PSG as described on the HPI.     Lab Results   Component Value Date    WBC 6.2 06/11/2018    HGB 11.8 (L) 09/10/2018    HCT 26.4 (L) 06/11/2018    MCV 91 06/11/2018     06/11/2018         Chemistry        Component Value Date/Time     09/10/2018 1246    K 5.0 09/10/2018 1246     (H) 09/10/2018 1246    CO2 26 09/10/2018 1246    BUN 54 (H) 09/10/2018 1246    CREATININE 3.23 (H) 09/10/2018 1246    GLU 66 (L) 09/10/2018 1246        Component Value Date/Time    CALCIUM 10.5 09/10/2018 1246    ALKPHOS 73 06/02/2018 0636    AST 33 06/02/2018 0636    ALT 36 06/02/2018 0636    BILITOT 0.7 06/02/2018 0636             Lab Results   Component Value Date    FERRITIN 86 09/10/2018       Lab Results   Component Value Date    HGBA1C 7.3 (H) 04/17/2018       Assessment and Plan:  In summary Sachin Berry is a 68 y.o. year old male here for follow up.  1. Obstructive Sleep Apnea  We had an extensive conversation to review the results of his sleep study.  We discussed treatment options including MAD and CPAP therapy.  He is claustrophobic and does not think that a CPAP device could be tolerated.   I will give him a list of some local dentists that could fit him with a " mandibular device.  We can consider CPAP therapy if he is not a candidate for MAD therapy.    2. Violent episodes while awakening.  We did not find evidence of RSWA.  Based on the description provided ,he seems to be startled when he is waking up.  I reviewed safety.       Patient verbalized understanding of these issues, agrees with the plan and all questions were answered today. Patient was given an opportuntity to voice any other symptoms or concerns not listed above. Patient did not have any other symptoms or concerns.      F/U after dental evaluation.    I explained to the patient that I will be transitioning to a different job soon. I reassured him that this transition should not cause any significant disruptions to his care. I provided some information on the process and encouraged him to contact our main number if he has any questions or needs any help.    MD DOUGLAS Interiano Board Certified in Internal Medicine and Sleep Medicine  [facility].

## 2021-06-23 NOTE — PROGRESS NOTES
Patient ID: Sachin Berry is a 68 y.o. male.  /84   Pulse 100   Wt (!) 240 lb 9.6 oz (109.1 kg)   SpO2 99%   BMI 30.89 kg/m      Assessment/Plan:                Diagnoses and all orders for this visit:    Type 2 diabetes mellitus with stage 4 chronic kidney disease, with long-term current use of insulin (H)  -     Glycosylated Hemoglobin A1c  -     [facility] RED  -     glipiZIDE (GLUCOTROL) 5 MG tablet  Dispense: 90 tablet; Refill: 3  -     insulin glargine (LANTUS SOLOSTAR U-100 INSULIN) 100 unit/mL (3 mL) pen  Dispense: 15 adj dose pen; Refill: 5  -     Comprehensive Metabolic Panel    Anemia due to stage 4 chronic kidney disease (H)  -     HM2(CBC w/o Differential)    Mixed hyperlipidemia    Essential hypertension    S/P CABG (coronary artery bypass graft)    Diabetic polyneuropathy associated with type 2 diabetes mellitus (H)    Other orders  -     Cancel: Microalbumin, Random Urine      DISCUSSION  Take glipizide just prior to evening meal.  Continue to monitor blood sugars closely.  Plan for six-month follow-up.  If blood sugars do not come into a more ideal range they will call me and we will consider making adjustments prior.  He will follow-up with nephrology and cardiology as they recommend.  No acute concerns are identified today overall seems to be doing well.  Subjective:     HPI    Sachin Berry is a 68 y.o. male who is here today with his wife.  He is a complex medical history that includes insulin-dependent type 2 diabetes, chronic kidney disease stage IV, coronary artery disease status post coronary graft surgery this past year.  Overall reports he is doing well.  Blood sugars have been higher in the evening time.  Patient had been taking his glipizide at bedtime.  Was likely not getting ideal coverage over the mealtime hours resulting in some higher numbers.  His A1c is actually in a reasonable range despite this.  Many readings in the evening are recorded at 200 or slightly higher.   Morning readings are generally more favorable.  Current dose of insulin 25 units once daily.  Takes glipizide 2.5 mg or one half of a 5 mg tablet regular release twice daily intended to be with meals.  Denies any low blood sugars.  No concerns or shortness of breath chest pain abdominal pain nausea vomiting or diarrhea.  Constipation has improved.  Is following closely with nephrology.  Kidney function has been stable.  Has had some mild tachycardia measured at times but is asymptomatic from this.  Does have chronic anemia associated with stage IV chronic kidney disease.  No history of blood loss.  No history of symptoms associated with it.  Last hemoglobin had demonstrated an increase from prior.  Laboratory tests from nephrology are reviewed.  Last nephrology visit was in September.  Discussed scheduling eye examination as he is due.  He does have diabetic neuropathy.  Symptoms are reported to be improved from a discomfort standpoint.  Does have thickened toenails but no other problems with the feet are reported.    He has been evaluated for and does have sleep apnea.  He is elected to use a dental device.  He needs to obtain this device.    Previous concerns have been brought up with memory difficulty and possible dementia.  This was prior to his cardiac surgery.  This is reported as much less of a concern.  We discussed the importance of continued close monitoring.    Review of Systems  Complete review of systems is obtained.  Other than the specific considerations noted above complete review of systems is negative.       Objective:   Medications:  Current Outpatient Medications   Medication Sig Note     ACCU-CHEK SMARTVIEW TEST STRIP strips USE ONE STRIP WITH YOUR ACCU-CHEK JEANNIE METER TO CHECK BLOOD SUGARS 3 TIME(S) DAILY,DX CODE E11.9      allopurinol (ZYLOPRIM) 100 MG tablet TAKE 1 TABLET (100 MG TOTAL) BY MOUTH DAILY.      aspirin 81 MG EC tablet Take 81 mg by mouth every evening.       atorvastatin  "(LIPITOR) 20 MG tablet Take 1 tablet (20 mg total) by mouth daily.      BD ULTRA-FINE SHORT PEN NEEDLE 31 gauge x 5/16\" Ndle USE AS DIRECTED      cyanocobalamin 1000 MCG tablet Take 1,000 mcg by mouth daily.      diltiazem (CARDIZEM CD) 300 MG 24 hr capsule Take 1 capsule (300 mg total) by mouth daily.      fenofibrate (TRICOR) 48 MG tablet Take 1 tablet (48 mg total) by mouth daily.      furosemide (LASIX) 40 MG tablet Take 1 tablet (40 mg total) by mouth daily.      glipiZIDE (GLUCOTROL) 5 MG tablet Take 0.5 tablets (2.5 mg total) by mouth 2 (two) times a day before meals. 1/2 Hour BEFORE meals      insulin glargine (LANTUS SOLOSTAR U-100 INSULIN) 100 unit/mL (3 mL) pen INJECT 40 UNITS UNDER THE SKIN DAILY      isosorbide mononitrate (IMDUR) 30 MG 24 hr tablet Take 30 mg by mouth daily.      LANTUS U-100 INSULIN 100 unit/mL injection Inject 25 Units under the skin at bedtime.      losartan (COZAAR) 100 MG tablet Take 100 mg by mouth daily.  6/22/2018: Received from: External Pharmacy     nitroglycerin (NITROSTAT) 0.4 MG SL tablet Place 1 tablet (0.4 mg total) under the tongue every 5 (five) minutes as needed for chest pain.      omega-3/dha/epa/fish oil (FISH OIL-OMEGA-3 FATTY ACIDS) 300-1,000 mg capsule Take 1 g by mouth daily.      Allergies:  Allergies   Allergen Reactions     Blood-Group Specific Substance      Anti-E.  Allow additional time for obtaining blood for transfusion.      Amlodipine      Edema       Amoxicillin-Pot Clavulanate Hives     Indomethacin      Dyspepsia       Metoprolol Succinate Hives     Tobacco:   reports that  has never smoked. he has never used smokeless tobacco.     Physical Exam      /84   Pulse 100   Wt (!) 240 lb 9.6 oz (109.1 kg)   SpO2 99%   BMI 30.89 kg/m          General Appearance:    Alert, cooperative, no distress, appears stated age   Eyes:   No scleral icterus or conjunctival irritation       Ears:    Normal TM's and external ear canals, both ears   Throat:   " Lips, mucosa, and tongue normal; teeth and gums normal   Neck:   Supple, symmetrical, trachea midline, no adenopathy;        thyroid:  No enlargement/tenderness/nodules   Lungs:     Clear to auscultation bilaterally, respirations unlabored, no wheezes or crackles   Heart:    Regular rate and rhythm,  no murmur, rub   or gallop   Extremities:   Extremities normal, atraumatic, no cyanosis or edema   Skin:   Skin color, texture, turgor normal, no rashes or lesions   Neurologic:   Normal strength and sensation        throughout on gross examination.  Close examination of the feet using monofilament line reveals that there is an absence of sensation in the distal aspect of the toes on the plantar surface.  Sensation returns more proximally in the foot just proximal to the MTP joints in the toes.  On the top of the foot the sensation is present in the more proximal aspect of the digits themselves.  It is consistent between the 2 feet.  There are no sores.  There is good capillary refill but I do not palpate distinct dorsalis pedis or posterior tibialis pulses.

## 2021-06-24 NOTE — TELEPHONE ENCOUNTER
Refill Approved    Rx renewed per Medication Renewal Policy. Medication was last renewed on 7/25/18.    Loren Larson, [facility] Triage/Med Refill 2/27/2019     Requested Prescriptions   Pending Prescriptions Disp Refills     fenofibrate (TRICOR) 48 MG tablet 90 tablet 2     Sig: Take 1 tablet (48 mg total) by mouth daily.    Fenofibrate Refill Protocol Passed - 2/27/2019  9:17 AM       Passed - Renal status in last 6 months    Creatinine   Date Value Ref Range Status   01/21/2019 2.90 (H) 0.70 - 1.30 mg/dL Final            Passed - Fasting lipid cascade in last 12 months    Cholesterol   Date Value Ref Range Status   05/11/2018 111 <=199 mg/dL Final     Triglycerides   Date Value Ref Range Status   05/11/2018 197 (H) <=149 mg/dL Final     HDL Cholesterol   Date Value Ref Range Status   05/11/2018 25 (L) >=40 mg/dL Final     LDL Calculated   Date Value Ref Range Status   05/11/2018 47 <=129 mg/dL Final     Patient Fasting > 8hrs?   Date Value Ref Range Status   04/17/2018 Unknown  Final            Passed - AST or ALT in last 12 months    AST   Date Value Ref Range Status   01/21/2019 15 0 - 40 U/L Final     ALT   Date Value Ref Range Status   01/21/2019 12 0 - 45 U/L Final              Passed - PCP or prescribing provider visit in past 12 months      Last office visit with prescriber/PCP: 1/21/2019 Brandan Arndt MD OR same dept: 1/21/2019 Brandan Arndt MD OR same specialty: 1/21/2019 Brandan Arndt MD  Last physical: 4/17/2018 Last MTM visit: Visit date not found   Next visit within 3 mo: Visit date not found  Next physical within 3 mo: Visit date not found  Prescriber OR PCP: Brandan Arndt MD  Last diagnosis associated with med order: 1. Hyperlipidemia, unspecified hyperlipidemia type  - fenofibrate (TRICOR) 48 MG tablet; Take 1 tablet (48 mg total) by mouth daily.  Dispense: 90 tablet; Refill: 2    If protocol passes may refill for 12 months if within 3 months of last provider visit  (or a total of 15 months).

## 2021-06-24 NOTE — TELEPHONE ENCOUNTER
Medication Request  Medication name: Accu - chek Lali Connect test strips  Pharmacy Name and Location: [facility] in [facility]  Reason for request: Patient has a new blood meter and needs test strips for the new machine.  When did you use medication last?:  today  Okay to leave a detailed message: yes

## 2021-06-25 NOTE — TELEPHONE ENCOUNTER
RN cannot approve Refill Request    RN can NOT refill this medication PCP messaged that patient is overdue for Office Visit. Last office visit: 10/12/2020 Brandan Arndt MD Last Physical: 4/17/2018 Last MTM visit: Visit date not found Last visit same specialty: 10/12/2020 Brandan Arndt MD.  Next visit within 3 mo: Visit date not found  Next physical within 3 mo: Visit date not found      Marleny Lam, [facility] Triage/Med Refill 6/14/2021    Requested Prescriptions   Pending Prescriptions Disp Refills     isosorbide mononitrate (IMDUR) 60 MG 24 hr tablet [Pharmacy Med Name: ISOSORBIDE MONONIT ER 60 MG TB] 90 tablet 3     Sig: TAKE 1 TABLET BY MOUTH EVERY DAY       Isosorbide Refill Protocol Failed - 6/14/2021 10:40 AM        Failed - Visit with PCP or prescribing provider visit in last 6 months or next 3 months     Last office visit with prescriber/PCP: Visit date not found OR same dept: 10/12/2020 Brandan Arndt MD OR same specialty: 10/12/2020 Brandan Arndt MD Last physical: Visit date not found Last MTM visit: Visit date not found     Next appt within 3 mo: Visit date not found  Next physical within 3 mo: Visit date not found  Prescriber OR PCP: Brandan Arndt MD  Last diagnosis associated with med order: 1. S/P CABG (coronary artery bypass graft)  - isosorbide mononitrate (IMDUR) 60 MG 24 hr tablet [Pharmacy Med Name: ISOSORBIDE MONONIT ER 60 MG TB]; TAKE 1 TABLET BY MOUTH EVERY DAY  Dispense: 90 tablet; Refill: 3    If protocol passes may refill for 6 months if within 3 months of last provider visit (or a total of 9 months).              Passed - Blood pressure filed in past 12 months     BP Readings from Last 1 Encounters:   10/12/20 136/76

## 2021-06-25 NOTE — TELEPHONE ENCOUNTER
"Data:   Wife calling to indicate they need insulin reordered for Lantus as they are out of insulin. [facility] [place]      Action:   Medication does not pass protocol, paged on-call MD at [phone], paged returned at [phone]. MD advised we could order the insulin for the pt. RTC to wife to indicate Rx has been sent to [facility] pharmacy.     Response:   Wife verbalizes understanding and agrees with plan of care.    Cornell Engel RN 6/14/2021 6:37 PM  [facility] Nurse Advisor.        COVID 19 Nurse Triage Plan/Patient Instructions    Please be aware that novel coronavirus (COVID-19) may be circulating in the community. If you develop symptoms such as fever, cough, or SOB or if you have concerns about the presence of another infection including coronavirus (COVID-19), please contact your health care provider or visit  [email or web address].    Disposition/Instructions    Additional COVID19 information to add for patients.   How can I protect others?  If you have symptoms (fever, cough, body aches or trouble breathing): Stay home and away from others (self-isolate) until:    At least 10 days have passed since your symptoms started, And     You ve had no fever--and no medicine that reduces fever--for 1 full day (24 hours), And      Your other symptoms have resolved (gotten better).     If you don t have symptoms, but a test showed that you have COVID-19 (you tested positive):    Stay home and away from others (self-isolate). Follow the tips under \"How do I self-isolate?\" below for 10 days (20 days if you have a weak immune system).    You don't need to be retested for COVID-19 before going back to school or work. As long as you're fever-free and feeling better, you can go back to school, work and other activities after waiting the 10 or 20 days.     How do I self-isolate?    Stay in your own room, even for meals. Use your own bathroom if you can.     Stay away " from others in your home. No hugging, kissing or shaking hands. No visitors.    Don t go to work, school or anywhere else.     Clean  high touch  surfaces often (doorknobs, counters, handles, etc.). Use a household cleaning spray or wipes. You ll find a full list on the EPA website:  www.epa.gov/pesticide-registration/list-n-disinfectants-use-against-sars-cov-2.    Cover your mouth and nose with a mask, tissue or washcloth to avoid spreading germs.    Wash your hands and face often. Use soap and water.    Caregivers in these groups are at risk for severe illness due to COVID-19:  o People 65 years and older  o People who live in a nursing home or long-term care facility  o People with chronic disease (lung, heart, cancer, diabetes, kidney, liver, immunologic)  o People who have a weakened immune system, including those who:  - Are in cancer treatment  - Take medicine that weakens the immune system, such as corticosteroids  - Had a bone marrow or organ transplant  - Have an immune deficiency  - Have poorly controlled HIV or AIDS  - Are obese (body mass index of 40 or higher)  - Smoke regularly    Caregivers should wear gloves while washing dishes, handling laundry and cleaning bedrooms and bathrooms.    Use caution when washing and drying laundry: Don t shake dirty laundry, and use the warmest water setting that you can.    For more tips, go to www.cdc.gov/coronavirus/2019-ncov/downloads/10Things.pdf.    How can I take care of myself?  1. Get lots of rest. Drink extra fluids (unless a doctor has told you not to).     2. Take Tylenol (acetaminophen) for fever or pain. If you have liver or kidney problems, ask your family doctor if it s okay to take Tylenol.     Adults can take either:     650 mg (two 325 mg pills) every 4 to 6 hours, or     1,000 mg (two 500 mg pills) every 8 hours as needed.     Note: Don t take more than 3,000 mg in one day.   Acetaminophen is found in many medicines (both prescribed and  over-the-counter medicines). Read all labels to be sure you don t take too much.     For children, check the Tylenol bottle for the right dose. The dose is based on the child s age or weight.    3. If you have other health problems (like cancer, heart failure, an organ transplant or severe kidney disease): Call your specialty clinic if you don t feel better in the next 2 days.    4. Know when to call 911: Emergency warning signs include:    Trouble breathing or shortness of breath    Pain or pressure in the chest that doesn t go away    Feeling confused like you haven t felt before, or not being able to wake up    Bluish-colored lips or face    What are the symptoms of COVID-19?     The most common symptoms are cough, fever and trouble breathing.     Less common symptoms include body aches, chills, diarrhea (loose, watery poops), fatigue (feeling very tired), headache, runny nose, sore throat and loss of smell.    COVID-19 can cause severe coughing (bronchitis) and lung infection (pneumonia).    How does it spread?     The virus may spread when a person coughs or sneezes into the air. The virus can travel about 6 feet this way, and it can live on surfaces.      Common  (household disinfectants) will kill the virus.    Who is at risk?  Anyone can catch COVID-19 if they re around someone who has the virus.    How can others protect themselves?     Stay away from people who have COVID-19 (or symptoms of COVID-19).    Wash hands often with soap and water. Or, use hand  with at least 60% alcohol.    Avoid touching the eyes, nose or mouth.     Wear a face mask when you go out in public, when sick or when caring for a sick person.    Where can I get more information?     "[employer] [place]: About COVID-19: [email or web address]    CDC: What to Do If You re Sick: www.cdc.gov/coronavirus/2019-ncov/about/steps-when-sick.html    CDC: Ending Home Isolation:  www.cdc.gov/coronavirus/2019-ncov/hcp/disposition-in-home-patients.html     CDC: Caring for Someone: www.cdc.gov/coronavirus/2019-ncov/if-you-are-sick/care-for-someone.html     [facility]: Interim Guidance for Hospital Discharge to Home: www.[facility]/diseases/coronavirus/hcp/hospdischarge.pdf    [facility] clinical trials (COVID-19 research studies): [email or web address].[facility]-clinical-trials     Below are the COVID-19 hotlines at the [place] Department of Health ([facility]). Interpreters are available.   o For health questions: Call 340-082-2749 or 1-156.353.6264 (7 a.m. to 7 p.m.)  o For questions about schools and childcare: Call 889-085-1536 or 1-333.751.1788 (7 a.m. to 7 p.m.)              Thank you for taking steps to prevent the spread of this virus.  o Limit your contact with others.  o Wear a simple mask to cover your cough.  o Wash your hands well and often.    Resources    [facility]: About COVID-19: [email or web address]    CDC: What to Do If You're Sick: www.cdc.gov/coronavirus/2019-ncov/about/steps-when-sick.html    CDC: Ending Home Isolation: www.cdc.gov/coronavirus/2019-ncov/hcp/disposition-in-home-patients.html     CDC: Caring for Someone: www.cdc.gov/coronavirus/2019-ncov/if-you-are-sick/care-for-someone.html     [facility]: Interim Guidance for Hospital Discharge to Home: www.[facility]./diseases/coronavirus/hcp/hospdischarge.pdf    [facility] clinical trials (COVID-19 research studies): [email or web address].[email or web address]-clinical-trials     Below are the COVID-19 hotlines at the [place] Department of Health ([facility]). Interpreters are available.   o For health questions: Call 289-877-1891 or 1-449.486.3736 (7 a.m. to 7 p.m.)  o For questions about schools and childcare: Call 344-949-6913 or 1-704.824.2576 (7 a.m. to 7 p.m.)

## 2021-06-26 NOTE — PROGRESS NOTES
Progress Notes by Ricarda Wong MD at 5/8/2018  1:50 PM     Author: Ricarda Wong MD Service: -- Author Type: Physician    Filed: 5/8/2018  2:44 PM Encounter Date: 5/8/2018 Status: Signed    : Ricarda Wong MD (Physician)           Click to link to [facility] HEART CARE NOTE    Thank you, Dr. Arndt, for asking the [facility] team to see . Sachin Berry in the rapid access clinic to evaluate new onset angina and abnormal nuclear stress study.    Assessment/Recommendations   Assessment:    1.  New onset angina with abnormal nuclear stress study.  The stress study demonstrates a large area of ischemia involving the distal anteroseptal, septal in inferior walls with possible small area of nontransmural infarction involving the inferior wall.  Patient reports acceleration in symptoms since undergoing his stress study yesterday with 3 episodes of chest discomfort after finishing his shower, requiring a sublingual nitroglycerin to alleviate.  In light of these symptoms, I would recommend admission to the telemetry floor for initiation of heparin infusion and early angiography.  He also would benefit from IV hydration overnight to minimize risks to his kidney function.  I have discussed the procedure with him including risks and benefits and he is agreeable to proceed.  2.  Essential hypertension, borderline controlled.  His blood pressure is elevated here today although this may be an incidental finding.  We will likely begin long-acting nitrates for treatment of his coronary artery disease which may help with blood pressure management.  3.  Mixed hyperlipidemia with excellent LDL control and recent lipid profile.  4.  Type 2 diabetes mellitus  5.  Fatigue.  Patient does report a history of PORSHA previously treated with CPAP.  He has not utilized CPAP for many years.  Over the last 5-6 months, his wife has noted increased snoring and apneic episodes.  Has follow-up with sleep  medicine scheduled for June.    Plan:  1.  Admit to the hospital and initiate long-acting nitrates and IV heparin  2.  Anticipate coronary angiography tomorrow with revascularization as suggested  3.  Obtain echocardiogram to evaluate source of heart murmur.  Also consider carotid ultrasound to exclude concomitant cerebrovascular disease.       History of Present Illness    Mr. Sachin Berry is a 68 y.o. male with history of essential hypertension, type 2 diabetes mellitus, mixed hyperlipidemia and stage IV chronic kidney disease who presents to the rapid access clinic for evaluation of new onset chest pain and abnormal nuclear stress study.  Patient was recently in to see his primary physician where he reported recent onset of exertional chest discomfort.  He describes a pressure sensation in the substernal area without any radiation but he does note accompanying shortness of breath and fatigue.  It would typically resolve with rest.  Because of the suspicious nature, he was referred for a nuclear stress study which was performed late last week demonstrating a large area of ischemia involving the anteroseptal, septal and inferior walls.  Based on this, he was referred in today for evaluation.  Since his stress study, he has noted acceleration of the discomfort with onset of angina while taking a shower the last 3 mornings.  This is resolved following a sublingual nitroglycerin.  His longest episode of chest pain is been 15 minutes.  He denies any episodes awakening him from sleep.    ECG (personally reviewed): ECG demonstrates normal sinus rhythm without acute changes    Cardiac Imaging Studies (personally reviewed): Nuclear stress study as reported above     Physical Examination Review of Systems   Vitals:    05/08/18 1400   BP: 144/70   Pulse: 64   Resp: 16     Body mass index is 34.3 kg/(m^2).  Wt Readings from Last 3 Encounters:   05/08/18 (!) 260 lb (117.9 kg)   04/17/18 (!) 262 lb 14.4 oz (119.3 kg)    11/28/17 (!) 252 lb (114.3 kg)     General Appearance:   Awake, Alert, No acute distress.   HEENT:  No scleral icterus; the mucous membranes were pink and moist.   Neck: No cervical bruits or jugular venous distention    Chest: The spine was straight. The chest was symmetric.   Lungs:   Respirations unlabored; the lungs are clear to auscultation. No wheezing   Cardiovascular:    Regular rate and rhythm.  S1, S2 normal.  1/6 early to mid peaking systolic ejection murmur heard at the left upper sternal border.  Radiation to the apex and possibly into the carotids.   Abdomen:  No organomegaly, masses, bruits, or tenderness. Bowels sounds are present   Extremities:  No peripheral edema, clubbing or cyanosis.  Distal pulses are symmetric.   Skin: No xanthelasma. Warm, Dry.   Musculoskeletal: No tenderness.   Neurologic: Mood and affect are appropriate.    General: WNL  Eyes: WNL  Ears/Nose/Throat: WNL  Lungs: WNL  Heart: Chest Pain, Shortness of Breath with activity  Stomach: WNL  Bladder: WNL  Muscle/Joints: WNL  Skin: WNL  Nervous System: WNL  Mental Health: WNL     Blood: WNL     Medical History  Surgical History Family History Social History   Past Medical History:   Diagnosis Date   ? Chronic Kidney Disease, Stage 3     Created by Conversion    ? Diabetes mellitus, type II    ? Diabetic Peripheral Neuropathy     Created by Conversion [facility] Annotation: Mar 16 2008  5:03PM - Brandan Arndt: MILD    ? Gout     Created by Conversion    ? High cholesterol    ? Hyperlipidemia     Created by Conversion    ? Hypertension     Created by Conversion    ? Type 2 Diabetes Mellitus     Created by Conversion     Past Surgical History:   Procedure Laterality Date   ? NH LAP,CHOLECYSTECTOMY      Description: Cholecystectomy Laparoscopic;  Recorded: 03/16/2008;    Family History   Problem Relation Age of Onset   ? Alzheimer's disease Mother    ? Chronic Kidney Disease Father    ? Breast cancer Sister     Social History  "    Social History   ? Marital status:      Spouse name: N/A   ? Number of children: N/A   ? Years of education: N/A     Occupational History   ? Not on file.     Social History Main Topics   ? Smoking status: Former Smoker   ? Smokeless tobacco: Never Used   ? Alcohol use No      Comment: rarely   ? Drug use: No   ? Sexual activity: Not on file     Other Topics Concern   ? Not on file     Social History Narrative          Medications  Allergies   Current Outpatient Prescriptions   Medication Sig Dispense Refill   ? ACCU-CHEK SMARTVIEW TEST STRIP strips USE ONE STRIP WITH YOUR ACCU-CHEK JEANNIE METER TO CHECK BLOOD SUGARS 3 TIME(S) DAILY 300 strip 0   ? albuterol (PROVENTIL HFA;VENTOLIN HFA) 90 mcg/actuation inhaler Inhale 2 puffs every 6 (six) hours as needed for wheezing or shortness of breath (COUGH). 1 Inhaler 0   ? allopurinol (ZYLOPRIM) 100 MG tablet TAKE 1 TABLET (100 MG TOTAL) BY MOUTH DAILY. 90 tablet 2   ? aspirin 81 MG EC tablet Take 81 mg by mouth daily.     ? BD INSULIN PEN NEEDLE UF SHORT 31 gauge x 5/16\" Ndle USE AS DIRECTED 100 each 3   ? blood glucose test (ACCU-CHEK JAMSHID PLUS TEST STRP) strips Test Blood sugar Twice daily.  Dispense brand per patient's insurance at pharmacy discretion. 200 strip 11   ? cloNIDine HCl (CATAPRES) 0.1 MG tablet TAKE 1 TABLET BY MOUTH TWICE A  tablet 2   ? diltiazem (CARDIZEM CD) 240 MG 24 hr capsule TAKE 1 CAPSULE BY MOUTH TWICE A DAY. 180 capsule 3   ? generic lancets (ACCU-CHEK FASTCLIX) Use one lancet with your Accu-Chek Jeannie meter to check blood sugar as directed 300 each 3   ? glipiZIDE (GLUCOTROL XL) 5 MG 24 hr tablet TAKE 1 TABLET (5 MG TOTAL) BY MOUTH DAILY WITH BREAKFAST. 90 tablet 3   ? insulin glargine (LANTUS SOLOSTAR) 100 unit/mL (3 mL) pen Inject 40 Units under the skin daily. 12 adj dose pen 6   ? insulin syringe-needle U-100 (BD INSULIN SYRINGE ULT-FINE II) 1 mL 31 x 5/16\" Syrg Inject daily as directed.     ? losartan-hydrochlorothiazide " (HYZAAR) 100-25 mg per tablet TAKE 1 TABLET BY MOUTH ONCE DAILY 90 tablet 2   ? NEEDLES, INSULIN DISPOSABLE (BD INSULIN PEN NEEDLE UF SHORT MISC) Use As Directed. 31G X 8 MM     ? nitroglycerin (NITROSTAT) 0.4 MG SL tablet Place 1 tablet (0.4 mg total) under the tongue every 5 (five) minutes as needed for chest pain. 25 tablet 3   ? sildenafil (REVATIO) 20 mg tablet Take 1-3 tablets (20-60 mg total) by mouth daily. 30 tablet 6   ? simvastatin (ZOCOR) 40 MG tablet TAKE 1 TABLET BY MOUTH EVERY DAY 90 tablet 3   ? scopolamine (TRANSDERM-SCOP) 1 mg over 3 days transdermal patch Place 1 patch on the skin every third day. 3 patch 0     No current facility-administered medications for this visit.       Allergies   Allergen Reactions   ? Amlodipine      Edema     ? Amoxicillin-Pot Clavulanate Hives   ? Indomethacin      Dyspepsia     ? Metoprolol Succinate Hives         Lab Results    Chemistry/lipid CBC Cardiac Enzymes/BNP/TSH/INR   Lab Results   Component Value Date    CHOL 140 04/17/2018    HDL 30 (L) 04/17/2018    LDLCALC 49 04/17/2018    TRIG 307 (H) 04/17/2018    CREATININE 2.34 (H) 04/17/2018    BUN 39 (H) 04/17/2018    K 5.0 04/17/2018     04/17/2018     04/17/2018    CO2 25 04/17/2018    Lab Results   Component Value Date    WBC 7.0 04/17/2018    HGB 12.9 (L) 04/17/2018    HCT 37.9 (L) 04/17/2018    MCV 91 04/17/2018     04/17/2018    Lab Results   Component Value Date    CKTOTAL 84 10/11/2011    TSH 0.96 04/17/2018

## 2021-06-26 NOTE — PROGRESS NOTES
Progress Notes by Ricarda Wong MD at 7/19/2018  8:50 AM     Author: Ricarda Wong MD Service: -- Author Type: Physician    Filed: 7/19/2018  9:29 AM Encounter Date: 7/19/2018 Status: Signed    : Ricarda Wong MD (Physician)           Click to link to [facility] HEART CARE NOTE    Thank you, Dr. Arndt, for asking the [facility] Heart Care team to see Mr. Sachin Berry to follow-up on recent bypass surgery.    Assessment/Recommendations   Assessment:    1.  Coronary artery disease, status post coronary artery bypass grafting.  Patient continues to do well postoperatively and making good progress in outpatient cardiac rehab.  He reports no complaints at this time.  Continue with current medications and aggressive risk factor modification.  2.  Mild aortic valve stenosis.  This was documented at the time of his hospitalization.  No intervention was performed.  I did recommend a follow-up echo in a year for reassessment.  3.  Essential hypertension, well controlled  4.  Hypercholesterolemia with good lipid levels  5.  Chronic kidney disease stage IV    Plan:  1.  Continue current medications  2.  Follow-up in 1 year with echocardiogram       History of Present Illness    Mr. Sachin Berry is a 68 y.o. male with history of type 2 diabetes mellitus, essential hypertension and mixed hyperlipidemia who presented to the clinic in May for evaluation of an abnormal nuclear stress study.  He reported recent onset of exertional chest discomfort which had been accelerating since the time of his stress study.  Because of this, I recommended admission to the hospital for IV fluid administration and plans to pursue coronary angiography.  This was subsequently done demonstrating multivessel coronary artery disease and he subsequently underwent coronary artery bypass grafting without complication.  He was noted to have heart murmur on examination with an echocardiogram showing mild aortic  valve stenosis.  No intervention of the valve was performed.    Today, he returns to the office for his 2 month postoperative visit.  He has been doing extremely well with outpatient cardiac rehab.  In fact, he tells me he has never experienced any sternal discomfort.  He denies any difficulty with medications.  No orthopnea, PND or lower extremity edema.    ECG (personally reviewed): No ECG today    Cardiac Imaging Studies (personally reviewed): No new studies since hospitalization     Physical Examination Review of Systems   Vitals:    07/19/18 0855   BP: 138/70   Pulse: 100   Resp: 16     Body mass index is 31.07 kg/(m^2).  Wt Readings from Last 3 Encounters:   07/19/18 (!) 242 lb (109.8 kg)   07/17/18 (!) 240 lb (108.9 kg)   07/12/18 (!) 239 lb (108.4 kg)     General Appearance:   Awake, Alert, No acute distress.   HEENT:  No scleral icterus; the mucous membranes were pink and moist.   Neck: No cervical bruits or jugular venous distention    Chest: The spine was straight. The chest was symmetric.  Midline sternal incision is well-healed.   Lungs:   Respirations unlabored; the lungs are clear to auscultation. No wheezing   Cardiovascular:    Regular rate and rhythm.  S1, S2 normal.  No murmur or gallop heard   Abdomen:  No organomegaly, masses, bruits, or tenderness. Bowels sounds are present   Extremities:   No peripheral edema, clubbing or cyanosis.   Skin: No xanthelasma. Warm, Dry.   Musculoskeletal: No tenderness.   Neurologic: Mood and affect are appropriate.    General: WNL  Eyes: WNL  Ears/Nose/Throat: WNL  Lungs: WNL  Heart: WNL  Stomach: WNL  Bladder: WNL  Muscle/Joints: WNL  Skin: WNL  Nervous System: WNL  Mental Health: WNL     Blood: WNL     Medical History  Surgical History Family History Social History   Past Medical History:   Diagnosis Date   ? CAD (coronary artery disease)    ? Chronic Kidney Disease, Stage 3     Created by Conversion    ? Diabetic Peripheral Neuropathy     Created by Conversion  "[facility] Annotation: Mar 16 2008  5:03PM - Brandan Arndt: MILD    ? Gout     resolved   ? Heart murmur    ? [place] (hard of hearing)    ? Hyperlipidemia     Created by Conversion    ? Hypertension     Created by Conversion    ? Neck rigidity     fused, non surgical   ? Rotator cuff tendonitis    ? Sleep apnea     does not use his CPAP   ? Sleep disorder     sleep walking, can wake up angry if touched when sleeping or sleep walking   ? Type 2 Diabetes Mellitus     Created by Conversion     Past Surgical History:   Procedure Laterality Date   ? CORONARY ARTERY BYPASS GRAFT N/A 5/17/2018    Procedure: CORONARY ARTERY BYPASS x 4, LEFT LEG ENDOSCOPIC VEIN HARVEST, LEFT INTERNAL MAMMARY ARTERY,EPI-AORTIC ULTRASOUND,  ANESTHESIA TRANSESOPHAGEAL ECHOCARDIOGRAM;  Surgeon: Meredith Peters MD;  Location: [facility] OR;  Service:    ? CV CORONARY ANGIOGRAM N/A 5/9/2018    Procedure: Coronary Angiogram;  Surgeon: Inderjit Bonilla MD;  Location: [facility] Cath Lab;  Service:    ? HERNIA REPAIR      umbilical   ? KNEE ARTHROSCOPY Right    ? [place] LAP,CHOLECYSTECTOMY      Description: Cholecystectomy Laparoscopic;  Recorded: 03/16/2008;    Family History   Problem Relation Age of Onset   ? Alzheimer's disease Mother    ? Chronic Kidney Disease Father    ? Breast cancer Sister     Social History     Social History   ? Marital status:      Spouse name: N/A   ? Number of children: N/A   ? Years of education: N/A     Occupational History   ? Not on file.     Social History Main Topics   ? Smoking status: Never Smoker   ? Smokeless tobacco: Never Used   ? Alcohol use Yes      Comment: rarely   ? Drug use: No   ? Sexual activity: Not on file     Other Topics Concern   ? Not on file     Social History Narrative          Medications  Allergies   Current Outpatient Prescriptions   Medication Sig Dispense Refill   ? BD ULTRA-FINE SHORT PEN NEEDLE 31 gauge x 5/16\" Ndle USE AS DIRECTED 200 each 3   ? cyanocobalamin 1000 MCG " tablet Take 1,000 mcg by mouth daily.     ? LANTUS U-100 INSULIN 100 unit/mL injection Inject 25 Units under the skin at bedtime. 10 mL PRN   ? nitroglycerin (NITROSTAT) 0.4 MG SL tablet Place 1 tablet (0.4 mg total) under the tongue every 5 (five) minutes as needed for chest pain. 25 tablet 3   ? allopurinol (ZYLOPRIM) 100 MG tablet TAKE 1 TABLET (100 MG TOTAL) BY MOUTH DAILY. 90 tablet 2   ? aspirin 81 MG EC tablet Take 81 mg by mouth every evening.      ? atorvastatin (LIPITOR) 20 MG tablet Take 1 tablet (20 mg total) by mouth daily. 90 tablet 2   ? diltiazem (CARDIZEM CD) 240 MG 24 hr capsule Take 1 capsule (240 mg total) by mouth daily.  3   ? fenofibrate (TRICOR) 48 MG tablet Take 1 tablet (48 mg total) by mouth daily. 60 tablet 0   ? furosemide (LASIX) 40 MG tablet Take 1 tablet (40 mg total) by mouth daily. 90 tablet 0   ? glipiZIDE (GLUCOTROL) 5 MG tablet Take 0.5 tablets (2.5 mg total) by mouth 2 (two) times a day before meals. 1/2 Hour BEFORE meals 60 tablet 6   ? isosorbide mononitrate (IMDUR) 30 MG 24 hr tablet Take 30 mg by mouth daily.     ? LANTUS SOLOSTAR U-100 INSULIN 100 unit/mL (3 mL) pen INJECT 40 UNITS UNDER THE SKIN DAILY 15 adj dose pen 3   ? losartan (COZAAR) 100 MG tablet Take 100 mg by mouth daily.      ? omega-3/dha/epa/fish oil (FISH OIL-OMEGA-3 FATTY ACIDS) 300-1,000 mg capsule Take 1 g by mouth daily.       No current facility-administered medications for this visit.       Allergies   Allergen Reactions   ? Blood-Group Specific Substance      Anti-E.  Allow additional time for obtaining blood for transfusion.    ? Amlodipine      Edema     ? Amoxicillin-Pot Clavulanate Hives   ? Indomethacin      Dyspepsia     ? Metoprolol Succinate Hives         Lab Results    Chemistry/lipid CBC Cardiac Enzymes/BNP/TSH/INR   Lab Results   Component Value Date    CHOL 111 05/11/2018    HDL 25 (L) 05/11/2018    LDLCALC 47 05/11/2018    TRIG 197 (H) 05/11/2018    CREATININE 3.39 (H) 06/11/2018    BUN 58  (H) 06/11/2018    K 4.3 06/11/2018     06/11/2018     06/11/2018    CO2 22 06/11/2018    Lab Results   Component Value Date    WBC 6.2 06/11/2018    HGB 8.8 (L) 06/11/2018    HCT 26.4 (L) 06/11/2018    MCV 91 06/11/2018     06/11/2018    Lab Results   Component Value Date    CKTOTAL 84 10/11/2011    TROPONINI 0.11 06/01/2018     (H) 05/20/2018    TSH 0.96 04/17/2018    INR 1.09 06/01/2018

## 2021-06-29 NOTE — PROGRESS NOTES
Progress Notes by Ricarda Wong MD at 9/15/2020  7:50 AM     Author: Ricarda Wong MD Service: -- Author Type: Physician    Filed: 9/15/2020 10:07 AM Encounter Date: 9/15/2020 Status: Signed    : Ricarda Wong MD (Physician)           Thank you, Dr. Chaudhari, for asking the [facility] Heart Care team to see Mr. Sachin Berry to follow-up on coronary artery disease, mixed hyperlipidemia and aortic valve stenosis.    Assessment/Recommendations   Assessment:    1.  Coronary artery disease, status post coronary artery bypass grafting in 2018.  Patient reports no clear anginal symptoms.  He did have an episode of [place] no other episodes.  At this point we will continue to observe for now.  2.  Mixed hyperlipidemia.  He has had no recent lipid profile.  Recommended a lipid profile this morning as he is fasting.  3.  Aortic valve stenosis, mild in 2018 at the time of bypass surgery.  We will plan echocardiogram in a couple months when they return to the [facility] for the winter.    Plan:  1.  Continue current medications for now  2.  Check lipid profile today  3.  Schedule echocardiogram in 2 months to follow-up aortic valve stenosis  4.  Tentative follow-up in 2 years or sooner if change in symptoms     History of Present Illness    Mr. Sahcin Berry is a 70 y.o. male with history of coronary artery disease, status post coronary artery bypass grafting in 2018, mild aortic valve stenosis and mixed lipidemia who presents today for routine follow-up visit.  He states he has been doing well since his last visit here 2 years ago following his surgery.  He denies any exertional chest discomfort or dyspnea although his wife reports one episode where he became quite dyspneic [place]  Patient states that is very steep.  Denied any associated chest discomfort.  No other episodes of dyspnea.  Denies any  orthopnea, PND or lightheadedness.    ECG (personally reviewed): No ECG today    Cardiac Imaging Studies (personally reviewed): No new imaging     Physical Examination Review of Systems   Vitals:    09/15/20 0803   BP: 142/64   Pulse: 64   Resp: 16     Body mass index is 30.11 kg/m .  Wt Readings from Last 3 Encounters:   09/15/20 222 lb (100.7 kg)   07/31/20 (!) 230 lb 9.6 oz (104.6 kg)   11/25/19 (!) 231 lb 12.8 oz (105.1 kg)     General Appearance:   Awake, Alert, No acute distress.   HEENT:  No scleral icterus; the mucous membranes were pink and moist.   Neck: No cervical bruits or jugular venous distention    Chest: The spine was straight. The chest was symmetric.   Lungs:   Respirations unlabored; the lungs are clear to auscultation. No wheezing   Cardiovascular:    Regular rate and rhythm.  S1, S2 normal.  2/6 mid peaking crescendo decrescendo murmur heard at the left sternal border.   Abdomen:  No organomegaly, masses, bruits, or tenderness. Bowels sounds are present   Extremities:  No peripheral edema bilaterally.   Skin: No xanthelasma. Warm, Dry.   Musculoskeletal: No tenderness.   Neurologic: Mood and affect are appropriate.    General: WNL  Eyes: WNL  Ears/Nose/Throat: WNL  Lungs: WNL  Heart: WNL  Stomach: WNL  Bladder: WNL  Muscle/Joints: WNL  Skin: WNL  Nervous System: WNL  Mental Health: WNL     Blood: WNL     Medical History  Surgical History Family History Social History   Past Medical History:   Diagnosis Date   ? CAD (coronary artery disease)    ? Chronic Kidney Disease, Stage 3     Created by Conversion    ? Diabetic Peripheral Neuropathy     Created by [employer] [facility] Annotation: Mar 16 2008  5:03PM - Brandan Arndt: MILD    ? Gout     resolved   ? Heart murmur    ? [place] (hard of hearing)    ? Hyperlipidemia     Created by Conversion    ? Hypertension     Created by Conversion    ? Neck rigidity     fused, non surgical   ? Rotator cuff tendonitis    ? Sleep apnea     does not use his CPAP    ? Sleep disorder     sleep walking, can wake up angry if touched when sleeping or sleep walking   ? Type 2 Diabetes Mellitus     Created by Conversion     Past Surgical History:   Procedure Laterality Date   ? CORONARY ARTERY BYPASS GRAFT N/A 5/17/2018    Procedure: CORONARY ARTERY BYPASS x 4, LEFT LEG ENDOSCOPIC VEIN HARVEST, LEFT INTERNAL MAMMARY ARTERY,EPI-AORTIC ULTRASOUND,  ANESTHESIA TRANSESOPHAGEAL ECHOCARDIOGRAM;  Surgeon: Meredith Peters MD;  Location: [facility] Main OR;  Service:    ? CV CORONARY ANGIOGRAM N/A 5/9/2018    Procedure: Coronary Angiogram;  Surgeon: Inderjit Bonilla MD;  Location: [facility] Cath Lab;  Service:    ? HERNIA REPAIR      umbilical   ? KNEE ARTHROSCOPY Right    ? [place] LAP,CHOLECYSTECTOMY      Description: Cholecystectomy Laparoscopic;  Recorded: 03/16/2008;    Family History   Problem Relation Age of Onset   ? Alzheimer's disease Mother    ? Chronic Kidney Disease Father    ? Breast cancer Sister     Social History     Socioeconomic History   ? Marital status:      Spouse name: Not on file   ? Number of children: Not on file   ? Years of education: Not on file   ? Highest education level: Not on file   Occupational History   ? Not on file   Social Needs   ? Financial resource strain: Not on file   ? Food insecurity     Worry: Not on file     Inability: Not on file   ? Transportation needs     Medical: Not on file     Non-medical: Not on file   Tobacco Use   ? Smoking status: Never Smoker   ? Smokeless tobacco: Never Used   Substance and Sexual Activity   ? Alcohol use: Yes     Comment: rarely   ? Drug use: No   ? Sexual activity: Not on file   Lifestyle   ? Physical activity     Days per week: Not on file     Minutes per session: Not on file   ? Stress: Not on file   Relationships   ? Social connections     Talks on phone: Not on file     Gets together: Not on file     Attends [employer] service: Not on file     Active member of club or organization: Not on file      "Attends meetings of clubs or organizations: Not on file     Relationship status: Not on file   ? Intimate partner violence     Fear of current or ex partner: Not on file     Emotionally abused: Not on file     Physically abused: Not on file     Forced sexual activity: Not on file   Other Topics Concern   ? Not on file   Social History Narrative   ? Not on file          Medications  Allergies   Current Outpatient Medications   Medication Sig Dispense Refill   ? albuterol (PROVENTIL) 2.5 mg /3 mL (0.083 %) nebulizer solution Take 3 mL (2.5 mg total) by nebulization every 4 (four) hours as needed for wheezing or shortness of breath (cough). 30 vial 0   ? allopurinol (ZYLOPRIM) 100 MG tablet Take 1 tablet (100 mg total) by mouth daily. 90 tablet 3   ? aspirin 81 MG EC tablet Take 81 mg by mouth every evening.      ? atorvastatin (LIPITOR) 20 MG tablet Take 1 tablet (20 mg total) by mouth daily. 90 tablet 3   ? BD ULTRA-FINE SHORT PEN NEEDLE 31 gauge x 5/16\" Ndle USE AS DIRECTED 100 each 7   ? benzonatate (TESSALON) 200 MG capsule Take 1 capsule (200 mg total) by mouth 3 (three) times a day as needed for cough. 30 capsule 0   ? blood glucose meter (GLUCOMETER) Use 1 each As Directed as needed. Dispense glucometer brand per patient's insurance at pharmacy discretion.  0   ? blood glucose test (ACCU-CHEK SMARTVIEW TEST STRIP) strips USE ONE STRIP WITH YOUR ACCU-CHEK JEANNIE METER TO CHECK BLOOD SUGARS 3 TIME(S) DAILY,DX CODE E11.9 300 strip 3   ? blood glucose test strips Use 1 each As Directed 4 (four) times a day. Dispense brand per patient's insurance at pharmacy discretion. 400 strip 3   ? cyanocobalamin 1000 MCG tablet Take 1,000 mcg by mouth daily.     ? diltiazem (CARDIZEM CD) 300 MG 24 hr capsule Take 1 capsule (300 mg total) by mouth daily. 90 capsule 3   ? fenofibrate (TRICOR) 48 MG tablet Take 1 tablet (48 mg total) by mouth daily. 90 tablet 3   ? furosemide (LASIX) 40 MG tablet Take 1 tablet (40 mg total) by mouth " daily. 90 tablet 3   ? generic lancets (FINGERSTIX LANCETS) Dispense brand per patient's insurance at pharmacy discretion. 200 each 3   ? insulin glargine (LANTUS SOLOSTAR U-100 INSULIN) 100 unit/mL (3 mL) pen INJECT 40 UNITS UNDER THE SKIN DAILY (Patient taking differently: Inject 14 Units under the skin daily. INJECT 14 UNITS UNDER THE SKIN DAILY) 15 adj dose pen 5   ? isosorbide mononitrate (IMDUR) 30 MG 24 hr tablet Take 1 tablet (30 mg total) by mouth daily. 90 tablet 3   ? isosorbide mononitrate (IMDUR) 60 MG 24 hr tablet TAKE 1 TABLET BY MOUTH EVERY DAY 90 tablet 3   ? LANTUS U-100 INSULIN 100 unit/mL injection Inject 25 Units under the skin at bedtime. 10 mL PRN   ? losartan (COZAAR) 100 MG tablet Take 1 tablet (100 mg total) by mouth daily. 90 tablet 3   ? nitroglycerin (NITROSTAT) 0.4 MG SL tablet Place 1 tablet (0.4 mg total) under the tongue every 5 (five) minutes as needed for chest pain. 25 tablet 3   ? omega-3/dha/epa/fish oil (FISH OIL-OMEGA-3 FATTY ACIDS) 300-1,000 mg capsule Take 1 g by mouth daily.     ? glipiZIDE (GLUCOTROL) 5 MG tablet Take 0.5 tablets (2.5 mg total) by mouth 2 (two) times a day before meals. 1/2 Hour BEFORE meals 90 tablet 3     No current facility-administered medications for this visit.       Allergies   Allergen Reactions   ? Blood-Group Specific Substance      Anti-E.  Allow additional time for obtaining blood for transfusion.    ? Amlodipine      Edema     ? Amoxicillin-Pot Clavulanate Hives   ? Indomethacin      Dyspepsia     ? Metoprolol Succinate Hives         Lab Results    Chemistry/lipid CBC Cardiac Enzymes/BNP/TSH/INR   Lab Results   Component Value Date    CHOL 111 05/11/2018    HDL 25 (L) 05/11/2018    LDLCALC 47 05/11/2018    TRIG 197 (H) 05/11/2018    CREATININE 3.13 (H) 07/31/2020    BUN 67 (H) 07/31/2020    K 5.1 (H) 07/31/2020     07/31/2020     07/31/2020    CO2 23 07/31/2020    Lab Results   Component Value Date    WBC 6.3 01/21/2019    HGB 11.3  (L) 03/31/2020    HCT 37.8 (L) 01/21/2019    MCV 91 01/21/2019     01/21/2019    Lab Results   Component Value Date    CKTOTAL 84 10/11/2011    TROPONINI 0.11 06/01/2018     (H) 05/20/2018    TSH 0.96 04/17/2018    INR 1.09 06/01/2018

## 2021-08-09 NOTE — TELEPHONE ENCOUNTER
Spouse dropped off [place] dept of public safety forms for dr steven to complete and chiquis out. Please call when this has been completed.

## 2021-11-08 PROBLEM — M10.9 GOUT: Status: ACTIVE | Noted: 2021-01-01

## 2021-11-08 PROBLEM — E11.21 DIABETIC NEPHROPATHY ASSOCIATED WITH TYPE 2 DIABETES MELLITUS (H): Status: ACTIVE | Noted: 2019-10-28

## 2021-11-09 NOTE — PROGRESS NOTES
Sachin Berry  BP (!) 152/62 (BP Location: Right arm, Patient Position: Sitting, Cuff Size: Adult Regular)   Pulse 66   Wt 97.1 kg (214 lb 1.6 oz)   BMI 29.04 kg/m       Assessment/Plan:                Sachin was seen today for recheck.    Diagnoses and all orders for this visit:    Type 2 diabetes mellitus with stage 4 chronic kidney disease, with long-term current use of insulin (H)  -     Hemoglobin A1c; Future  -     Hemoglobin A1c  -     insulin glargine (LANTUS SOLOSTAR) 100 UNIT/ML pen; Inject 10 Units Subcutaneous daily  -     Lipid panel reflex to direct LDL Fasting; Future  -     Hepatic panel (Albumin, ALT, AST, Bili, Alk Phos, TP); Future    S/P CABG (coronary artery bypass graft)    Coronary artery disease involving native heart without angina pectoris, unspecified vessel or lesion type    Benign essential hypertension    Gouty arthropathy    Hyperlipidemia, unspecified hyperlipidemia type    Anemia of chronic renal failure, stage 4 (severe) (H)    Chronic kidney disease, stage IV (severe) (H)  -     Hemoglobin  -     Renal panel  -     Magnesium    Other orders  -     HEPATITIS A VACCINE (ADULT)  -     HEPATITIS B VACCINE, ADULT, IM         DISCUSSION  obtain additional labs.  Refill medications.  Immunizations as noted.  Referral to neurology.  Subjective:     HPI:    Sachin Berry is a 71 year old male with a complex medical history is here today with his wife.  He has insulin-dependent type 2 diabetes with stage IV chronic kidney disease.  He follows with nephrology.  He has a history of coronary disease with coronary artery bypass graft surgery.  No reported concerning symptoms.  Blood pressure controlled.  He is on appropriate medications for risk reduction.  He has also had memory difficulty that is consistent with an evolving dementia process over the last several years.  I have not seen him for 1 year.  His current diabetic management includes Lantus insulin and Victoza.  He has  "anemia of chronic kidney disease and follows with hematology to manage his anemia.    Diabetes control is very good.  He is pursuing possibilities for renal transplant.  There is consideration regarding evolving dementia process discussed referral to neurology.  Discussed possibility of seasonal affective disorder does sleep more in the winter months typically.  Does not endorse specific depression symptoms currently no indication to change treatment course.  Discussed obtaining some immunizations and checking some labs today.  No indication to change treatment course overall.        ROS:  Complete review of systems is obtained.  Other than the specific considerations noted above complete review of systems is negative.    Objective:   Medications:  Current Outpatient Medications   Medication     albuterol (PROVENTIL) 2.5 mg /3 mL (0.083 %) nebulizer solution     allopurinoL (ZYLOPRIM) 100 MG tablet     aspirin 81 MG EC tablet     atorvastatin (LIPITOR) 20 MG tablet     blood glucose meter (GLUCOMETER)     blood glucose test (ACCU-CHEK SMARTVIEW TEST STRIP) strips     blood glucose test strips     cyanocobalamin 1000 MCG tablet     diltiazem (CARDIZEM CD) 300 MG 24 hr capsule     diltiazem ER (TIAZAC) 300 MG 24 hr ER beaded capsule     fenofibrate (TRICOR) 48 MG tablet     ferrous sulfate (FEROSUL) 325 (65 Fe) MG tablet     furosemide (LASIX) 20 MG tablet     generic lancets (FINGERSTIX LANCETS)     insulin glargine (LANTUS SOLOSTAR) 100 UNIT/ML pen     isosorbide mononitrate (IMDUR) 60 MG 24 hr tablet     losartan (COZAAR) 100 MG tablet     nitroglycerin (NITROSTAT) 0.4 MG SL tablet     pen needle, diabetic (BD ULTRA-FINE SHORT PEN NEEDLE) 31 gauge x 5/16\" Ndle     amLODIPine (NORVASC) 5 MG tablet     No current facility-administered medications for this visit.        Allergies:     Allergies   Allergen Reactions     Blood-Group Specific Substance Unknown     Anti-E.  Allow additional time for obtaining blood for " transfusion.      Indomethacin Unknown and Rash     Dyspepsia    At least 15 years ago      Amlodipine Unknown     Edema       Amoxicillin-Pot Clavulanate [Augmentin] Hives     Metoprolol Succinate [Metoprolol] Hives        Social History     Socioeconomic History     Marital status:      Spouse name: Not on file     Number of children: Not on file     Years of education: Not on file     Highest education level: Not on file   Occupational History     Not on file   Tobacco Use     Smoking status: Never Smoker     Smokeless tobacco: Never Used   Substance and Sexual Activity     Alcohol use: Yes     Comment: Alcoholic Drinks/day: rarely     Drug use: No     Sexual activity: Not on file   Other Topics Concern     Not on file   Social History Narrative     Not on file     Social Determinants of Health     Financial Resource Strain: Not on file   Food Insecurity: Not on file   Transportation Needs: Not on file   Physical Activity: Not on file   Stress: Not on file   Social Connections: Not on file   Intimate Partner Violence: Not on file   Housing Stability: Not on file       Family History   Problem Relation Age of Onset     Alzheimer Disease Mother      Chronic Kidney Disease Father      Breast Cancer Sister         Most Recent Immunizations   Administered Date(s) Administered     COVID-19,PF,Moderna 10/26/2021     DT (PEDS <7y) 04/25/2003     FLU 6-35 months 09/23/2010     Flu 65+ Years 10/01/2013     Flu, Unspecified 11/13/2014     HepA-Adult 11/09/2021     HepB-Adult 11/09/2021     Influenza (H1N1) 12/08/2009     Influenza (High Dose) 3 valent vaccine 10/14/2019     Influenza (IIV3) PF 10/01/2013     Influenza Quad, Recombinant, pf(RIV4) (Flublok) 11/13/2014     Influenza Vaccine IM > 6 months Valent IIV4 (Alfuria,Fluzone) 11/13/2014, 11/13/2014     Influenza Vaccine, 6+MO IM (QUADRIVALENT W/PRESERVATIVES) 10/01/2013     Influenza, Quad, High Dose, Pf, 65yr+ (Fluzone HD) 10/06/2021     Pneumo Conj 13-V  (2010&after) 10/22/2015     Pneumococcal 23 valent 03/24/2014     TDAP Vaccine (Boostrix) 05/18/2010     Tdap (Adacel,Boostrix) 05/18/2010     Zoster vaccine, live 06/13/2008        Wt Readings from Last 3 Encounters:   11/09/21 97.1 kg (214 lb 1.6 oz)   10/12/20 100.7 kg (222 lb)   09/15/20 100.7 kg (222 lb)        BP Readings from Last 6 Encounters:   11/09/21 (!) 152/62   10/12/20 136/76   09/15/20 (!) 142/64   07/31/20 (!) 153/75   11/29/19 115/66   11/25/19 134/75        Hemoglobin A1C   Date Value Ref Range Status   11/09/2021 5.3 0.0 - 5.6 % Final     Comment:     Normal <5.7%   Prediabetes 5.7-6.4%    Diabetes 6.5% or higher     Note: Adopted from ADA consensus guidelines.   10/12/2020 6.9 (H) <=5.6 % Final     Comment:     Normal <5.7% Prediabete 5.7-6.4% Diabletes 6.5% or higher - adopted from ADA consensus guidelines   03/31/2020 6.0 3.5 - 6.0 % Final      PHYSICAL EXAM:    BP (!) 152/62 (BP Location: Right arm, Patient Position: Sitting, Cuff Size: Adult Regular)   Pulse 66   Wt 97.1 kg (214 lb 1.6 oz)   BMI 29.04 kg/m           General Appearance:    Alert, cooperative, no distress   Eyes:   No scleral icterus or conjunctival irritation       Ears:    Normal TM's and external ear canals, both ears   Throat:   Lips, mucosa, and tongue normal; teeth and gums normal   Neck:   Supple, symmetrical, trachea midline, no adenopathy;        thyroid:  No enlargement/tenderness/nodules   Lungs:     Clear to auscultation bilaterally, respirations unlabored, no wheezes or crackles   Heart:    Regular rate and rhythm,  No murmur   Abdomen:    Soft, no distention, no tenderness on palpation, no masses, no organomegaly     Extremities:  No edema, no joint swelling or redness, no evidence of any injuries   Skin:  No concerning skin findings, no suspicious moles, no rashes   Neurologic:  On gross examination there is no motor or sensory deficit.  Patient walks with a normal gait

## 2021-12-05 NOTE — TELEPHONE ENCOUNTER
Routing refill request to provider for review/approval because:  Labs out of range:  CBC, creatinine  Labs not current:  Uric Acid, ALT    Outpatient Medication Detail     Disp Refills Start End TOBI   allopurinoL (ZYLOPRIM) 100 MG tablet 90 tablet 3 11/4/2020  No   Sig: TAKE 1 TABLET BY MOUTH EVERY DAY   Sent to pharmacy as: allopurinoL 100 mg tablet (ZYLOPRIM)   E-Prescribing Status: Receipt confirmed by pharmacy (11/4/2020 12:21 PM CST)       allopurinoL (ZYLOPRIM) 100 MG tablet [[id number]]    Electronically signed by: Erin Fernandez, RN on 11/04/20 1221 Status: Active   Ordering user: Erin Fernandez RN 11/04/20 1221 Ordering provider: Brandan Arndt MD   Authorized by: Brandan Arndt MD   Frequency:  11/04/20 - Until Discontinued Released by: Erin Fernandez RN 11/04/20 1221   Diagnoses  Gouty arthropathy [M10.9]       Last office visit provider:  11/9/21     Requested Prescriptions   Pending Prescriptions Disp Refills     allopurinol (ZYLOPRIM) 100 MG tablet [Pharmacy Med Name: ALLOPURINOL 100 MG TABLET] 90 tablet 3     Sig: TAKE 1 TABLET BY MOUTH EVERY DAY       Gout Agents Protocol Failed - 12/3/2021 12:18 AM        Failed - CBC on file in past 12 months     Recent Labs   Lab Test 11/09/21  1026 10/12/20  1000   WBC  --  4.9   RBC  --  3.38*   HGB 11.4* 10.4*   HCT  --  30.8*   PLT  --  221                 Failed - ALT on file in past 12 months     Recent Labs   Lab Test 10/12/20  1000   ALT <9             Failed - Has Uric Acid on file in past 12 months and value is less than 6     No lab results found.  If level is 6mg/dL or greater, ok to refill one time and refer to provider.           Failed - Normal serum creatinine on file in the past 12 months     Recent Labs   Lab Test 11/09/21  1026   CR 3.36*       Ok to refill medication if creatinine is low          Passed - Recent (12 mo) or future (30 days) visit within the authorizing provider's specialty     Patient has had an office visit with the  "authorizing provider or a provider within the authorizing providers department within the previous 12 mos or has a future within next 30 days. See \"Patient Info\" tab in inbasket, or \"Choose Columns\" in Meds & Orders section of the refill encounter.              Passed - Medication is active on med list        Passed - Patient is age 18 or older             Jareth Jacobs RN 12/05/21 1:05 PM  "

## 2021-12-16 NOTE — TELEPHONE ENCOUNTER
Routing refill request to provider for review/approval because:  Labs out of range:  Creatinine, potassium  Labs not current:  LDL, lipid panel  Blood pressure out of range    Fenofibrate Last Written Prescription Date:  11/4/2020  Last Fill Quantity: 90,  # refills: 3   Last office visit provider:  11/9/2021 Dr. Arndt     Atorvastatin Last Written Prescription Date:  11/4/2020  Last Fill Quantity: 90,  # refills: 3   Last office visit provider:  11/9/2021 Dr. Arndt     Losartan Last Written Prescription Date:  11/4/2020  Last Fill Quantity: 90,  # refills: 3   Last office visit provider:  11/9/2021 Dr. Arndt     Furosemide Last Written Prescription Date:  11/4/2020  Last Fill Quantity: 90,  # refills: 3   Last office visit provider:  11/9/2021 Dr. Arndt     fenofibrate (TRICOR) 48 MG tablet 90 tablet 3 11/4/2020  No   Sig: TAKE 1 TABLET BY MOUTH EVERY DAY   Sent to pharmacy as: fenofibrate nanocrystallized 48 mg tablet (TRICOR)   E-Prescribing Status: Receipt confirmed by pharmacy (11/4/2020 12:21 PM CST     atorvastatin (LIPITOR) 20 MG tablet 90 tablet 3 11/4/2020  No   Sig: TAKE 1 TABLET BY MOUTH EVERY DAY   Sent to pharmacy as: atorvastatin 20 mg tablet (LIPITOR)   E-Prescribing Status: Receipt confirmed by pharmacy (11/4/2020 12:21 PM CST     losartan (COZAAR) 100 MG tablet 90 tablet 3 11/4/2020  No   Sig: TAKE 1 TABLET BY MOUTH EVERY DAY   Sent to pharmacy as: losartan 100 mg tablet (COZAAR)   E-Prescribing Status: Receipt confirmed by pharmacy (11/4/2020 12:21 PM CST)     furosemide (LASIX) 40 MG tablet 90 tablet 3 11/4/2020  No   Sig: TAKE 1 TABLET BY MOUTH EVERY DAY   Sent to pharmacy as: furosemide 40 mg tablet (LASIX)   E-Prescribing Status: Receipt confirmed by pharmacy (11/4/2020 12:21 PM CST         Requested Prescriptions   Pending Prescriptions Disp Refills     fenofibrate (TRICOR) 48 MG tablet [Pharmacy Med Name: FENOFIBRATE 48 MG TABLET] 90 tablet 3     Sig: TAKE 1 TABLET BY MOUTH EVERY DAY        "Fibrates Failed - 12/14/2021 12:24 AM        Failed - Lipid panel on file in past 12 months     Recent Labs   Lab Test 09/15/20  0826   CHOL 133   TRIG 99   HDL 37*   LDL 76               Passed - No abnormal creatine kinase in past 12 months     No lab results found.             Passed - Recent (12 mo) or future (30 days) visit within the authorizing provider's specialty     Patient has had an office visit with the authorizing provider or a provider within the authorizing providers department within the previous 12 mos or has a future within next 30 days. See \"Patient Info\" tab in inbasket, or \"Choose Columns\" in Meds & Orders section of the refill encounter.              Passed - Medication is active on med list        Passed - Patient is age 18 or older           atorvastatin (LIPITOR) 20 MG tablet [Pharmacy Med Name: ATORVASTATIN 20 MG TABLET] 90 tablet 3     Sig: TAKE 1 TABLET BY MOUTH EVERY DAY       Statins Protocol Failed - 12/14/2021 12:24 AM        Failed - LDL on file in past 12 months     Recent Labs   Lab Test 09/15/20  0826   LDL 76             Passed - No abnormal creatine kinase in past 12 months     No lab results found.             Passed - Recent (12 mo) or future (30 days) visit within the authorizing provider's specialty     Patient has had an office visit with the authorizing provider or a provider within the authorizing providers department within the previous 12 mos or has a future within next 30 days. See \"Patient Info\" tab in inbasket, or \"Choose Columns\" in Meds & Orders section of the refill encounter.              Passed - Medication is active on med list        Passed - Patient is age 18 or older           losartan (COZAAR) 100 MG tablet [Pharmacy Med Name: LOSARTAN POTASSIUM 100 MG TAB] 90 tablet 3     Sig: TAKE 1 TABLET BY MOUTH EVERY DAY       Angiotensin-II Receptors Failed - 12/14/2021 12:24 AM        Failed - Last blood pressure under 140/90 in past 12 months     BP Readings from " "Last 3 Encounters:   11/09/21 (!) 152/62   10/12/20 136/76   09/15/20 (!) 142/64                 Failed - Normal serum creatinine on file in past 12 months     Recent Labs   Lab Test 11/09/21  1026   CR 3.36*       Ok to refill medication if creatinine is low          Failed - Normal serum potassium on file in past 12 months     Recent Labs   Lab Test 11/09/21  1026   POTASSIUM 5.1*                    Passed - Recent (12 mo) or future (30 days) visit within the authorizing provider's specialty     Patient has had an office visit with the authorizing provider or a provider within the authorizing providers department within the previous 12 mos or has a future within next 30 days. See \"Patient Info\" tab in inbasket, or \"Choose Columns\" in Meds & Orders section of the refill encounter.              Passed - Medication is active on med list        Passed - Patient is age 18 or older           furosemide (LASIX) 40 MG tablet [Pharmacy Med Name: FUROSEMIDE 40 MG TABLET] 90 tablet 3     Sig: TAKE 1 TABLET BY MOUTH EVERY DAY       Diuretics (Including Combos) Protocol Failed - 12/14/2021 12:24 AM        Failed - Blood pressure under 140/90 in past 12 months     BP Readings from Last 3 Encounters:   11/09/21 (!) 152/62   10/12/20 136/76   09/15/20 (!) 142/64                 Failed - Normal serum creatinine on file in past 12 months     Recent Labs   Lab Test 11/09/21  1026   CR 3.36*              Failed - Normal serum potassium on file in past 12 months     Recent Labs   Lab Test 11/09/21  1026   POTASSIUM 5.1*                    Passed - Recent (12 mo) or future (30 days) visit within the authorizing provider's specialty     Patient has had an office visit with the authorizing provider or a provider within the authorizing providers department within the previous 12 mos or has a future within next 30 days. See \"Patient Info\" tab in inbasket, or \"Choose Columns\" in Meds & Orders section of the refill encounter.              " Passed - Medication is active on med list        Passed - Patient is age 18 or older        Passed - Normal serum sodium on file in past 12 months     Recent Labs   Lab Test 11/09/21  1026                      Pili Diaz RN 12/15/21 10:37 PM

## 2021-12-18 NOTE — TELEPHONE ENCOUNTER
"Routing refill request to provider for review/approval because:  Elevated blood pressure's on file.    Last Written Prescription Date:  11/04/2020  Last Fill Quantity: 90,  # refills: 3   Last office visit provider:  11/09/2021 with Dr Arndt.    Requested Prescriptions   Pending Prescriptions Disp Refills     diltiazem ER COATED BEADS (CARDIZEM CD/CARTIA XT) 300 MG 24 hr capsule [Pharmacy Med Name: DILTIAZEM 24H ER(CD) 300 MG CP] 90 capsule 3     Sig: TAKE 1 CAPSULE BY MOUTH EVERY DAY       Calcium Channel Blockers Protocol  Failed - 12/15/2021  2:53 PM        Failed - Blood pressure under 140/90 in past 12 months     BP Readings from Last 3 Encounters:   11/09/21 (!) 152/62   10/12/20 136/76   09/15/20 (!) 142/64                 Failed - Normal ALT in past 12 months     Recent Labs   Lab Test 10/12/20  1000   ALT <9             Failed - Normal serum creatinine on file in past 12 months     Recent Labs   Lab Test 11/09/21  1026   CR 3.36*       Ok to refill medication if creatinine is low          Passed - Recent (12 mo) or future (30 days) visit within the authorizing provider's specialty     Patient has had an office visit with the authorizing provider or a provider within the authorizing providers department within the previous 12 mos or has a future within next 30 days. See \"Patient Info\" tab in inbasket, or \"Choose Columns\" in Meds & Orders section of the refill encounter.              Passed - Medication is active on med list        Passed - Patient is age 18 or older             Shira Torres 12/17/21 7:22 PM  "

## 2022-01-01 ENCOUNTER — TRANSFERRED RECORDS (OUTPATIENT)
Dept: HEALTH INFORMATION MANAGEMENT | Facility: CLINIC | Age: 72
End: 2022-01-01
Payer: COMMERCIAL

## 2022-01-01 ENCOUNTER — TELEPHONE (OUTPATIENT)
Dept: FAMILY MEDICINE | Facility: CLINIC | Age: 72
End: 2022-01-01
Payer: COMMERCIAL

## 2022-01-01 DIAGNOSIS — T75.3XXS MOTION SICKNESS, SEQUELA: Primary | ICD-10-CM

## 2022-01-01 LAB — RETINOPATHY: NEGATIVE

## 2022-01-01 RX ORDER — SCOLOPAMINE TRANSDERMAL SYSTEM 1 MG/1
1 PATCH, EXTENDED RELEASE TRANSDERMAL
Qty: 10 PATCH | Refills: 0 | Status: SHIPPED | OUTPATIENT
Start: 2022-01-01

## 2022-01-14 NOTE — TELEPHONE ENCOUNTER
Patient has history of motion sickness and needs patches for a cruise that she will on soon.     Please adjust prescription as you deem appropriate.      not assessed

## 2022-02-13 PROBLEM — R07.9 CHEST PAIN: Status: RESOLVED | Noted: 2018-05-08 | Resolved: 2022-01-01

## 2022-02-13 PROBLEM — R63.0 POOR APPETITE: Status: RESOLVED | Noted: 2018-05-22 | Resolved: 2022-01-01

## 2022-02-13 PROBLEM — R45.86 EMOTIONAL LABILITY: Status: RESOLVED | Noted: 2018-05-22 | Resolved: 2022-01-01

## 2022-02-13 PROBLEM — M25.50 POLYARTHRALGIA: Status: RESOLVED | Noted: 2017-08-08 | Resolved: 2022-01-01

## 2022-02-13 PROBLEM — F43.21 ADJUSTMENT DISORDER WITH DEPRESSED MOOD: Status: RESOLVED | Noted: 2018-05-22 | Resolved: 2022-01-01

## 2022-02-13 PROBLEM — I24.9 ACS (ACUTE CORONARY SYNDROME) (H): Status: RESOLVED | Noted: 2018-05-08 | Resolved: 2022-01-01

## 2022-02-13 PROBLEM — M54.2 CERVICALGIA: Status: RESOLVED | Noted: 2017-08-08 | Resolved: 2022-01-01

## 2022-02-13 PROBLEM — N17.9 ACUTE ON CHRONIC RENAL FAILURE (H): Status: RESOLVED | Noted: 2018-06-01 | Resolved: 2022-01-01

## 2022-02-13 PROBLEM — N18.9 ACUTE ON CHRONIC RENAL FAILURE (H): Status: RESOLVED | Noted: 2018-06-01 | Resolved: 2022-01-01
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
